# Patient Record
Sex: FEMALE | Race: WHITE | NOT HISPANIC OR LATINO | Employment: FULL TIME | URBAN - METROPOLITAN AREA
[De-identification: names, ages, dates, MRNs, and addresses within clinical notes are randomized per-mention and may not be internally consistent; named-entity substitution may affect disease eponyms.]

---

## 2022-06-24 ENCOUNTER — OFFICE VISIT (OUTPATIENT)
Dept: URGENT CARE | Facility: CLINIC | Age: 22
End: 2022-06-24
Payer: MEDICAID

## 2022-06-24 VITALS
DIASTOLIC BLOOD PRESSURE: 77 MMHG | HEART RATE: 74 BPM | SYSTOLIC BLOOD PRESSURE: 114 MMHG | OXYGEN SATURATION: 100 % | HEIGHT: 67 IN | RESPIRATION RATE: 19 BRPM | TEMPERATURE: 99 F | BODY MASS INDEX: 35.91 KG/M2 | WEIGHT: 228.81 LBS

## 2022-06-24 DIAGNOSIS — Z20.2 EXPOSURE TO SYPHILIS: Primary | ICD-10-CM

## 2022-06-24 DIAGNOSIS — Z11.3 SCREEN FOR STD (SEXUALLY TRANSMITTED DISEASE): ICD-10-CM

## 2022-06-24 LAB
C TRACH DNA SPEC QL NAA+PROBE: NOT DETECTED
HAV IGM SERPL QL IA: NONREACTIVE
HBV CORE IGM SERPL QL IA: NONREACTIVE
HBV SURFACE AG SERPL QL IA: NONREACTIVE
HCV AB SERPL QL IA: NONREACTIVE
HIV 1+2 AB+HIV1 P24 AG SERPL QL IA: NONREACTIVE
N GONORRHOEA DNA SPEC QL NAA+PROBE: NOT DETECTED
T PALLIDUM AB SER QL: REACTIVE

## 2022-06-24 PROCEDURE — 99213 PR OFFICE/OUTPT VISIT, EST, LEVL III, 20-29 MIN: ICD-10-PCS | Mod: S$PBB,,, | Performed by: NURSE PRACTITIONER

## 2022-06-24 PROCEDURE — 99203 OFFICE O/P NEW LOW 30 MIN: CPT | Mod: PBBFAC | Performed by: NURSE PRACTITIONER

## 2022-06-24 PROCEDURE — 86592 SYPHILIS TEST NON-TREP QUAL: CPT | Performed by: NURSE PRACTITIONER

## 2022-06-24 PROCEDURE — 99213 OFFICE O/P EST LOW 20 MIN: CPT | Mod: S$PBB,,, | Performed by: NURSE PRACTITIONER

## 2022-06-24 PROCEDURE — 87591 N.GONORRHOEAE DNA AMP PROB: CPT | Performed by: NURSE PRACTITIONER

## 2022-06-24 PROCEDURE — 80074 ACUTE HEPATITIS PANEL: CPT | Performed by: NURSE PRACTITIONER

## 2022-06-24 PROCEDURE — 36415 COLL VENOUS BLD VENIPUNCTURE: CPT | Performed by: NURSE PRACTITIONER

## 2022-06-24 PROCEDURE — 87491 CHLMYD TRACH DNA AMP PROBE: CPT | Performed by: NURSE PRACTITIONER

## 2022-06-24 PROCEDURE — 87389 HIV-1 AG W/HIV-1&-2 AB AG IA: CPT | Performed by: NURSE PRACTITIONER

## 2022-06-24 PROCEDURE — 86780 TREPONEMA PALLIDUM: CPT | Performed by: NURSE PRACTITIONER

## 2022-06-24 RX ORDER — DOXYCYCLINE 100 MG/1
100 CAPSULE ORAL 2 TIMES DAILY
Qty: 28 CAPSULE | Refills: 0 | Status: SHIPPED | OUTPATIENT
Start: 2022-06-24 | End: 2022-07-08

## 2022-06-24 NOTE — PROGRESS NOTES
"Subjective:       Patient ID: April Luis is a 22 y.o. female.    Vitals:  height is 5' 7" (1.702 m) and weight is 103.8 kg (228 lb 12.8 oz). Her oral temperature is 98.6 °F (37 °C). Her blood pressure is 114/77 and her pulse is 74. Her respiration is 19 and oxygen saturation is 100%.     Chief Complaint: Exposure to STD (syphilis)    Patient is a 22-year-old female, here today for exposure to syphilis.  Patient states her partner recently tested positive for syphilis.  Denies any symptoms today.  States she did have STD screening in December or January, states blood work was not done, states that she has never had blood work for STDs done.      Constitution: Negative.   HENT: Negative.    Neck: neck negative.   Cardiovascular: Negative.    Respiratory: Negative.    Skin: Negative.    Neurological: Negative.        Objective:      Physical Exam   Constitutional: She is oriented to person, place, and time. She appears well-developed.   HENT:   Head: Normocephalic.   Eyes: Conjunctivae and EOM are normal. Pupils are equal, round, and reactive to light.   Neck: Neck supple.   Cardiovascular: Normal rate, regular rhythm and normal heart sounds.   Pulmonary/Chest: Effort normal and breath sounds normal.   Musculoskeletal: Normal range of motion.         General: Normal range of motion.   Neurological: She is alert and oriented to person, place, and time.   Skin: Skin is warm and dry.   Psychiatric: Her behavior is normal.   Vitals reviewed.        Assessment:       1. Exposure to syphilis    2. Screen for STD (sexually transmitted disease)            No visits with results within 1 Day(s) from this visit.   Latest known visit with results is:   No results found for any previous visit.        No results found.   Plan:         Allergy noted to penicillin, will treat with alternative at this time.  In file sexual contacts for previous 90 days in the may also be treated.  Abstain from intercourse during treatment and for 1 " week after.  May follow-up with PCP in 6 months for f/u testing.      STD panel done and sent to lab, will notify of abnormal results.  Educated patient on the patient portal.  Practice safe sex and follow-up with PCP.    Exposure to syphilis  -     SYPHILIS ANTIBODY (WITH REFLEX RPR)  -     doxycycline (VIBRAMYCIN) 100 MG Cap; Take 1 capsule (100 mg total) by mouth 2 (two) times daily. for 14 days  Dispense: 28 capsule; Refill: 0    Screen for STD (sexually transmitted disease)  -     Chlamydia/GC, PCR  -     SYPHILIS ANTIBODY (WITH REFLEX RPR)  -     HIV 1/2 Ag/Ab (4th Gen)  -     Hepatitis Panel, Acute

## 2022-06-27 DIAGNOSIS — A53.9 SYPHILIS: Primary | ICD-10-CM

## 2022-06-27 LAB
RPR SER QL: ABNORMAL
RPR SER QL: REACTIVE
RPR SER-TITR: ABNORMAL {TITER}

## 2022-06-27 NOTE — PROGRESS NOTES
Please inform this patient that she needs to see the Infectious Disease Clinic at [facility].  A 6 month follow up with her PCP is not adequate.     I have placed a referral for ID Clinic and they will call with appointment. Please tell her to answer her phone and ensure we have the right number and an alternate.

## 2022-06-29 ENCOUNTER — TELEPHONE (OUTPATIENT)
Dept: URGENT CARE | Facility: CLINIC | Age: 22
End: 2022-06-29
Payer: MEDICAID

## 2022-06-29 NOTE — TELEPHONE ENCOUNTER
----- Message from BRITT Anne sent at 6/27/2022 10:41 AM CDT -----  Please inform this patient that she needs to see the Infectious Disease Clinic at [facility].  A 6 month follow up with her PCP is not adequate.     I have placed a referral for ID Clinic and they will call with appointment. Please tell her to answer her phone and ensure we have the right number and an alternate.

## 2022-06-29 NOTE — TELEPHONE ENCOUNTER
----- Message from BRITT Hamilton sent at 6/25/2022 11:46 AM CDT -----  Please notify that patient is positive for syphilis, she is penicillin allergic so I sent an alternate medication into the pharmacy.  She is to complete this medication as ordered.  She may follow-up with her primary doctor in 6 months for follow-up.

## 2022-07-11 ENCOUNTER — OFFICE VISIT (OUTPATIENT)
Dept: FAMILY MEDICINE | Facility: CLINIC | Age: 22
End: 2022-07-11
Payer: MEDICAID

## 2022-07-11 VITALS
DIASTOLIC BLOOD PRESSURE: 82 MMHG | HEIGHT: 67 IN | SYSTOLIC BLOOD PRESSURE: 119 MMHG | BODY MASS INDEX: 36.13 KG/M2 | TEMPERATURE: 98 F | HEART RATE: 70 BPM | WEIGHT: 230.19 LBS | OXYGEN SATURATION: 99 % | RESPIRATION RATE: 20 BRPM

## 2022-07-11 DIAGNOSIS — T78.40XA ALLERGY, INITIAL ENCOUNTER: ICD-10-CM

## 2022-07-11 DIAGNOSIS — Z00.00 ENCOUNTER FOR WELLNESS EXAMINATION: Primary | ICD-10-CM

## 2022-07-11 PROBLEM — J45.909 ASTHMA DUE TO ENVIRONMENTAL ALLERGIES: Status: ACTIVE | Noted: 2022-07-11

## 2022-07-11 LAB
ALBUMIN SERPL-MCNC: 4.5 GM/DL (ref 3.5–5)
ALBUMIN/GLOB SERPL: 1.3 RATIO (ref 1.1–2)
ALP SERPL-CCNC: 40 UNIT/L (ref 40–150)
ALT SERPL-CCNC: 44 UNIT/L (ref 0–55)
APPEARANCE UR: CLEAR
AST SERPL-CCNC: 37 UNIT/L (ref 5–34)
BACTERIA #/AREA URNS AUTO: ABNORMAL /HPF
BASOPHILS # BLD AUTO: 0.02 X10(3)/MCL (ref 0–0.2)
BASOPHILS NFR BLD AUTO: 0.4 %
BILIRUB UR QL STRIP.AUTO: NEGATIVE MG/DL
BILIRUBIN DIRECT+TOT PNL SERPL-MCNC: 2.3 MG/DL
BUN SERPL-MCNC: 10.2 MG/DL (ref 7–18.7)
CALCIUM SERPL-MCNC: 9.5 MG/DL (ref 8.4–10.2)
CHLORIDE SERPL-SCNC: 102 MMOL/L (ref 98–107)
CHOLEST SERPL-MCNC: 130 MG/DL
CHOLEST/HDLC SERPL: 4 {RATIO} (ref 0–5)
CO2 SERPL-SCNC: 26 MMOL/L (ref 22–29)
COLOR UR AUTO: ABNORMAL
CREAT SERPL-MCNC: 0.62 MG/DL (ref 0.55–1.02)
EOSINOPHIL # BLD AUTO: 0.11 X10(3)/MCL (ref 0–0.9)
EOSINOPHIL NFR BLD AUTO: 1.9 %
ERYTHROCYTE [DISTWIDTH] IN BLOOD BY AUTOMATED COUNT: 12.6 % (ref 11.5–17)
EST. AVERAGE GLUCOSE BLD GHB EST-MCNC: 85.3 MG/DL
GLOBULIN SER-MCNC: 3.4 GM/DL (ref 2.4–3.5)
GLUCOSE SERPL-MCNC: 80 MG/DL (ref 74–100)
GLUCOSE UR QL STRIP.AUTO: NORMAL MG/DL
HBA1C MFR BLD: 4.6 %
HCT VFR BLD AUTO: 41.2 % (ref 37–47)
HDLC SERPL-MCNC: 32 MG/DL (ref 35–60)
HGB BLD-MCNC: 13.8 GM/DL (ref 12–16)
HYALINE CASTS #/AREA URNS LPF: ABNORMAL /LPF
IMM GRANULOCYTES # BLD AUTO: 0.01 X10(3)/MCL (ref 0–0.04)
IMM GRANULOCYTES NFR BLD AUTO: 0.2 %
KETONES UR QL STRIP.AUTO: NEGATIVE MG/DL
LDLC SERPL CALC-MCNC: 80 MG/DL (ref 50–140)
LEUKOCYTE ESTERASE UR QL STRIP.AUTO: NEGATIVE UNIT/L
LYMPHOCYTES # BLD AUTO: 1.96 X10(3)/MCL (ref 0.6–4.6)
LYMPHOCYTES NFR BLD AUTO: 34.4 %
MCH RBC QN AUTO: 28.2 PG (ref 27–31)
MCHC RBC AUTO-ENTMCNC: 33.5 MG/DL (ref 33–36)
MCV RBC AUTO: 84.3 FL (ref 80–94)
MONOCYTES # BLD AUTO: 0.39 X10(3)/MCL (ref 0.1–1.3)
MONOCYTES NFR BLD AUTO: 6.9 %
MUCOUS THREADS URNS QL MICRO: ABNORMAL /LPF
NEUTROPHILS # BLD AUTO: 3.2 X10(3)/MCL (ref 2.1–9.2)
NEUTROPHILS NFR BLD AUTO: 56.2 %
NITRITE UR QL STRIP.AUTO: NEGATIVE
NRBC BLD AUTO-RTO: 0 %
PH UR STRIP.AUTO: 7 [PH]
PLATELET # BLD AUTO: 233 X10(3)/MCL (ref 130–400)
PMV BLD AUTO: 10.5 FL (ref 7.4–10.4)
POTASSIUM SERPL-SCNC: 4.6 MMOL/L (ref 3.5–5.1)
PROT SERPL-MCNC: 7.9 GM/DL (ref 6.4–8.3)
PROT UR QL STRIP.AUTO: NEGATIVE MG/DL
RBC # BLD AUTO: 4.89 X10(6)/MCL (ref 4.2–5.4)
RBC #/AREA URNS AUTO: ABNORMAL /HPF
RBC UR QL AUTO: NEGATIVE UNIT/L
SODIUM SERPL-SCNC: 135 MMOL/L (ref 136–145)
SP GR UR STRIP.AUTO: 1.01
SQUAMOUS #/AREA URNS LPF: ABNORMAL /HPF
T4 FREE SERPL-MCNC: 0.82 NG/DL (ref 0.7–1.48)
TRIGL SERPL-MCNC: 92 MG/DL (ref 37–140)
TSH SERPL-ACNC: 2.05 UIU/ML (ref 0.35–4.94)
UROBILINOGEN UR STRIP-ACNC: NORMAL MG/DL
VLDLC SERPL CALC-MCNC: 18 MG/DL
WBC # SPEC AUTO: 5.7 X10(3)/MCL (ref 4.5–11.5)
WBC #/AREA URNS AUTO: ABNORMAL /HPF

## 2022-07-11 PROCEDURE — 1159F PR MEDICATION LIST DOCUMENTED IN MEDICAL RECORD: ICD-10-PCS | Mod: CPTII,,,

## 2022-07-11 PROCEDURE — 84439 ASSAY OF FREE THYROXINE: CPT

## 2022-07-11 PROCEDURE — 3079F DIAST BP 80-89 MM HG: CPT | Mod: CPTII,,,

## 2022-07-11 PROCEDURE — 85025 COMPLETE CBC W/AUTO DIFF WBC: CPT

## 2022-07-11 PROCEDURE — 3008F BODY MASS INDEX DOCD: CPT | Mod: CPTII,,,

## 2022-07-11 PROCEDURE — 83036 HEMOGLOBIN GLYCOSYLATED A1C: CPT

## 2022-07-11 PROCEDURE — 99213 OFFICE O/P EST LOW 20 MIN: CPT | Mod: PBBFAC,PN

## 2022-07-11 PROCEDURE — 3008F PR BODY MASS INDEX (BMI) DOCUMENTED: ICD-10-PCS | Mod: CPTII,,,

## 2022-07-11 PROCEDURE — 3074F SYST BP LT 130 MM HG: CPT | Mod: CPTII,,,

## 2022-07-11 PROCEDURE — 99213 OFFICE O/P EST LOW 20 MIN: CPT | Mod: S$PBB,,,

## 2022-07-11 PROCEDURE — 3079F PR MOST RECENT DIASTOLIC BLOOD PRESSURE 80-89 MM HG: ICD-10-PCS | Mod: CPTII,,,

## 2022-07-11 PROCEDURE — 80053 COMPREHEN METABOLIC PANEL: CPT

## 2022-07-11 PROCEDURE — 99213 PR OFFICE/OUTPT VISIT, EST, LEVL III, 20-29 MIN: ICD-10-PCS | Mod: S$PBB,,,

## 2022-07-11 PROCEDURE — 3074F PR MOST RECENT SYSTOLIC BLOOD PRESSURE < 130 MM HG: ICD-10-PCS | Mod: CPTII,,,

## 2022-07-11 PROCEDURE — 80061 LIPID PANEL: CPT

## 2022-07-11 PROCEDURE — 81001 URINALYSIS AUTO W/SCOPE: CPT

## 2022-07-11 PROCEDURE — 36415 COLL VENOUS BLD VENIPUNCTURE: CPT

## 2022-07-11 PROCEDURE — 1159F MED LIST DOCD IN RCRD: CPT | Mod: CPTII,,,

## 2022-07-11 PROCEDURE — 84443 ASSAY THYROID STIM HORMONE: CPT

## 2022-07-11 RX ORDER — FLUTICASONE PROPIONATE 50 MCG
1 SPRAY, SUSPENSION (ML) NASAL DAILY
Qty: 18.2 ML | Refills: 3 | Status: SHIPPED | OUTPATIENT
Start: 2022-07-11 | End: 2023-02-15 | Stop reason: CLARIF

## 2022-07-11 RX ORDER — LORATADINE 10 MG/1
10 TABLET ORAL DAILY
Qty: 30 TABLET | Refills: 3 | Status: SHIPPED | OUTPATIENT
Start: 2022-07-11 | End: 2023-08-15

## 2022-07-11 NOTE — PROGRESS NOTES
Patient Name: Aleksandra Smith   : 2000  MRN: [id number]     Subjective:   Patient ID: Aleksandra Smith is a 22 y.o. female.    Chief Complaint:   Chief Complaint   Patient presents with    Establish Care        HPI: 2022:  Recently tested positive for syphilis; treated with doxy due to pen allergy. Otherwise she has no medical concerns. She has occasional constipation for which she is easily relieved by OTC meds. Seasonal allergies, takes OTC medications with relief.       ROS:  Review of Systems   Constitutional: Negative for chills, fever and weight loss.   HENT: Negative for ear discharge, nosebleeds and tinnitus.    Eyes: Negative for blurred vision, photophobia and pain.   Respiratory: Negative for cough, shortness of breath, wheezing and stridor.    Cardiovascular: Negative for chest pain, palpitations and orthopnea.   Gastrointestinal: Negative for abdominal pain, heartburn and nausea.   Genitourinary: Negative for dysuria, frequency, hematuria and urgency.   Musculoskeletal: Negative for falls and myalgias.   Skin: Negative for itching and rash.   Neurological: Negative for dizziness, sensory change, speech change, focal weakness, seizures, weakness and headaches.   Endo/Heme/Allergies: Negative for environmental allergies. Does not bruise/bleed easily.   Psychiatric/Behavioral: Negative for hallucinations and suicidal ideas.      History:   History reviewed. No pertinent past medical history.   History reviewed. No pertinent surgical history.  Family History   Family history unknown: Yes      Social History     Tobacco Use    Smoking status: Current Every Day Smoker     Types: Vaping with nicotine    Smokeless tobacco: Current User   Substance and Sexual Activity    Alcohol use: Not Currently    Drug use: Yes     Types: Marijuana    Sexual activity: Yes     Partners: Male        Allergies:   Review of patient's allergies indicates:   Allergen Reactions    Penicillins      Unknown: Was told by  "mother that she was allergic     Objective:     Vitals:    07/11/22 1315   BP: 119/82   Pulse: 70   Resp: 20   Temp: 98.1 °F (36.7 °C)   SpO2: 99%   Weight: 104.4 kg (230 lb 2.6 oz)   Height: 5' 7" (1.702 m)     Body mass index is 36.05 kg/m².     Physical Examination:   Physical Exam  Vitals reviewed.   Constitutional:       Appearance: Normal appearance. She is normal weight.   HENT:      Head: Normocephalic.      Right Ear: Tympanic membrane, ear canal and external ear normal.      Left Ear: Tympanic membrane, ear canal and external ear normal.      Nose: Nose normal.      Mouth/Throat:      Mouth: Mucous membranes are moist.      Pharynx: Oropharynx is clear.   Eyes:      Extraocular Movements: Extraocular movements intact.      Conjunctiva/sclera: Conjunctivae normal.      Pupils: Pupils are equal, round, and reactive to light.   Cardiovascular:      Rate and Rhythm: Normal rate and regular rhythm.      Pulses: Normal pulses.      Heart sounds: Normal heart sounds.   Pulmonary:      Effort: Pulmonary effort is normal.      Breath sounds: Normal breath sounds.   Abdominal:      General: Abdomen is flat. Bowel sounds are normal.      Palpations: Abdomen is soft.   Musculoskeletal:         General: Normal range of motion.      Cervical back: Normal range of motion and neck supple.   Skin:     General: Skin is warm and dry.   Neurological:      General: No focal deficit present.      Mental Status: She is alert and oriented to person, place, and time.   Psychiatric:         Mood and Affect: Mood normal.         Behavior: Behavior normal.         Assessment:     Problem List Items Addressed This Visit        Other    Allergies (Chronic)    Overview       Avoid/limit triggers such as pollen, dust, molds and animal dander.  Avoid smoke, strong chemicals, and strong cleaning products in addition to strong perfumes/colognes.  Use rescue inhaler when needed, use nebulizer for wheezing or URI.  Report Upper Respiratory " Infection in early stages and seek treatment.               Current Assessment & Plan     Claritin and Flonase sent to pharmacy to trial           Relevant Medications    loratadine (CLARITIN) 10 mg tablet    fluticasone propionate (FLONASE) 50 mcg/actuation nasal spray      Other Visit Diagnoses     Encounter for wellness examination    -  Primary    Relevant Orders    CBC Auto Differential    Comprehensive Metabolic Panel    Lipid Panel    T4, Free    TSH    Urinalysis    Hemoglobin A1C    Ambulatory referral/consult to Gynecology          Plan:   April was seen today for establish care.    Diagnoses and all orders for this visit:    Encounter for wellness examination  -     CBC Auto Differential  -     Comprehensive Metabolic Panel  -     Lipid Panel  -     T4, Free  -     TSH  -     Urinalysis  -     Hemoglobin A1C  -     Ambulatory referral/consult to Gynecology; Future    Allergy, initial encounter  -     loratadine (CLARITIN) 10 mg tablet; Take 1 tablet (10 mg total) by mouth once daily.  -     fluticasone propionate (FLONASE) 50 mcg/actuation nasal spray; 1 spray (50 mcg total) by Each Nostril route once daily.       No follow-ups on file.       This note was created with the assistance of a voice recognition software or phone dictation. There may be transcription errors as a result of using this technology however minimal. Effort has been made to assure accuracy of transcription but any obvious errors or omissions should be clarified with the author of the document

## 2022-07-24 ENCOUNTER — OFFICE VISIT (OUTPATIENT)
Dept: URGENT CARE | Facility: CLINIC | Age: 22
End: 2022-07-24
Payer: MEDICAID

## 2022-07-24 VITALS
SYSTOLIC BLOOD PRESSURE: 124 MMHG | TEMPERATURE: 98 F | HEART RATE: 71 BPM | RESPIRATION RATE: 18 BRPM | BODY MASS INDEX: 36.26 KG/M2 | WEIGHT: 231 LBS | DIASTOLIC BLOOD PRESSURE: 81 MMHG | HEIGHT: 67 IN | OXYGEN SATURATION: 98 %

## 2022-07-24 DIAGNOSIS — J01.90 ACUTE SINUSITIS, RECURRENCE NOT SPECIFIED, UNSPECIFIED LOCATION: Primary | ICD-10-CM

## 2022-07-24 DIAGNOSIS — Z11.52 ENCOUNTER FOR SCREENING FOR COVID-19: ICD-10-CM

## 2022-07-24 DIAGNOSIS — R68.89 FLU-LIKE SYMPTOMS: ICD-10-CM

## 2022-07-24 LAB
CTP QC/QA: YES
CTP QC/QA: YES
FLUAV AG NPH QL: NEGATIVE
FLUBV AG NPH QL: NEGATIVE
SARS-COV-2 RDRP RESP QL NAA+PROBE: NEGATIVE

## 2022-07-24 PROCEDURE — 99213 OFFICE O/P EST LOW 20 MIN: CPT | Mod: PBBFAC | Performed by: NURSE PRACTITIONER

## 2022-07-24 PROCEDURE — 87804 INFLUENZA ASSAY W/OPTIC: CPT | Mod: PBBFAC | Performed by: NURSE PRACTITIONER

## 2022-07-24 PROCEDURE — 99213 PR OFFICE/OUTPT VISIT, EST, LEVL III, 20-29 MIN: ICD-10-PCS | Mod: S$PBB,,, | Performed by: NURSE PRACTITIONER

## 2022-07-24 PROCEDURE — 99213 OFFICE O/P EST LOW 20 MIN: CPT | Mod: S$PBB,,, | Performed by: NURSE PRACTITIONER

## 2022-07-24 PROCEDURE — U0002 COVID-19 LAB TEST NON-CDC: HCPCS | Mod: PBBFAC | Performed by: NURSE PRACTITIONER

## 2022-07-24 RX ORDER — AZITHROMYCIN 250 MG/1
TABLET, FILM COATED ORAL
Qty: 6 TABLET | Refills: 0 | Status: SHIPPED | OUTPATIENT
Start: 2022-07-24 | End: 2023-02-15 | Stop reason: CLARIF

## 2022-07-24 RX ORDER — DEXAMETHASONE SODIUM PHOSPHATE 100 MG/10ML
8 INJECTION INTRAMUSCULAR; INTRAVENOUS
Status: COMPLETED | OUTPATIENT
Start: 2022-07-24 | End: 2022-07-24

## 2022-07-24 RX ADMIN — DEXAMETHASONE SODIUM PHOSPHATE 8 MG: 100 INJECTION INTRAMUSCULAR; INTRAVENOUS at 12:07

## 2022-07-24 NOTE — PROGRESS NOTES
"Subjective:       Patient ID: April Luis is a 22 y.o. female.    Vitals:  height is 5' 7.01" (1.702 m) and weight is 104.8 kg (231 lb). Her oral temperature is 97.9 °F (36.6 °C). Her blood pressure is 124/81 and her pulse is 71. Her respiration is 18 and oxygen saturation is 98%.     Chief Complaint: Nasal Congestion, Sinus Problem, Headache, no smell, no taste, and Generalized Body Aches    Patient is a 22-year-old female, here today for nasal congestion, headache pressure, no taste or smell, body aches x 1.5 weeks. Taking otc claritin.       Constitution: Negative.   HENT: Positive for congestion and sinus pressure.    Neck: neck negative.   Cardiovascular: Negative.    Respiratory: Negative.        Objective:      Physical Exam   Constitutional: She is oriented to person, place, and time. She appears well-developed.   HENT:   Head: Normocephalic.   Ears:   Right Ear: Tympanic membrane normal.   Left Ear: Tympanic membrane normal.   Nose: Right sinus exhibits frontal sinus tenderness. Left sinus exhibits frontal sinus tenderness.   Eyes: Conjunctivae and EOM are normal. Pupils are equal, round, and reactive to light.   Neck: Neck supple.   Cardiovascular: Normal rate, regular rhythm and normal heart sounds.   Pulmonary/Chest: Effort normal and breath sounds normal.   Abdominal: Bowel sounds are normal. Soft.   Musculoskeletal: Normal range of motion.         General: Normal range of motion.   Neurological: She is alert and oriented to person, place, and time.   Skin: Skin is warm and dry. Capillary refill takes less than 2 seconds.   Psychiatric: Her behavior is normal.   Vitals reviewed.        Assessment:       1. Acute sinusitis, recurrence not specified, unspecified location    2. Encounter for screening for COVID-19    3. Flu-like symptoms            Office Visit on 07/24/2022   Component Date Value Ref Range Status    POC Rapid COVID 07/24/2022 Negative  Negative Final     Acceptable " 07/24/2022 Yes   Final    Rapid Influenza A Ag 07/24/2022 Negative  Negative Final    Rapid Influenza B Ag 07/24/2022 Negative  Negative Final     Acceptable 07/24/2022 Yes   Final        No results found.   Plan:         Decadron 8mg IM in office.   Medication as ordered. May use humidifier.  If any shortness of breath, wheezing, continued fevers or any new symptoms then immediately go to ER.    Acute sinusitis, recurrence not specified, unspecified location  -     dexamethasone injection 8 mg  -     azithromycin (ZITHROMAX Z-CARMELITA) 250 MG tablet; Take 2 tablets (500 mg) on  Day 1,  followed by 1 tablet (250 mg) once daily on Days 2 through 5.  Dispense: 6 tablet; Refill: 0    Encounter for screening for COVID-19  -     POCT COVID-19 Rapid Screening    Flu-like symptoms  -     POCT Influenza A/B

## 2022-07-24 NOTE — LETTER
July 24, 2022      [facility]  [address] [facility] [address]  Phone: [phone]       Patient: Aleksandra Smith   YOB: 2000  Date of Visit: 07/24/2022    To Whom It May Concern:    Amadou Smith  was at [facility] on 07/24/2022. The patient may return to work/school on 07/25/2022 with no restrictions. If you have any questions or concerns, or if I can be of further assistance, please do not hesitate to contact me.    Sincerely,    Elvia Campo LPN

## 2022-10-26 ENCOUNTER — OFFICE VISIT (OUTPATIENT)
Dept: FAMILY MEDICINE | Facility: CLINIC | Age: 22
End: 2022-10-26
Payer: MEDICAID

## 2022-10-26 VITALS
BODY MASS INDEX: 37.65 KG/M2 | OXYGEN SATURATION: 96 % | WEIGHT: 239.88 LBS | RESPIRATION RATE: 18 BRPM | HEIGHT: 67 IN | SYSTOLIC BLOOD PRESSURE: 116 MMHG | HEART RATE: 89 BPM | DIASTOLIC BLOOD PRESSURE: 81 MMHG | TEMPERATURE: 98 F

## 2022-10-26 DIAGNOSIS — N89.8 VAGINA ITCHING: Primary | ICD-10-CM

## 2022-10-26 DIAGNOSIS — Z86.19 HISTORY OF SYPHILIS: ICD-10-CM

## 2022-10-26 LAB — T PALLIDUM AB SER QL: REACTIVE

## 2022-10-26 PROCEDURE — 3074F SYST BP LT 130 MM HG: CPT | Mod: CPTII,,,

## 2022-10-26 PROCEDURE — 1159F MED LIST DOCD IN RCRD: CPT | Mod: CPTII,,,

## 2022-10-26 PROCEDURE — 99213 OFFICE O/P EST LOW 20 MIN: CPT | Mod: PBBFAC,PN

## 2022-10-26 PROCEDURE — 3074F PR MOST RECENT SYSTOLIC BLOOD PRESSURE < 130 MM HG: ICD-10-PCS | Mod: CPTII,,,

## 2022-10-26 PROCEDURE — 99213 OFFICE O/P EST LOW 20 MIN: CPT | Mod: S$PBB,,,

## 2022-10-26 PROCEDURE — 3079F PR MOST RECENT DIASTOLIC BLOOD PRESSURE 80-89 MM HG: ICD-10-PCS | Mod: CPTII,,,

## 2022-10-26 PROCEDURE — 3079F DIAST BP 80-89 MM HG: CPT | Mod: CPTII,,,

## 2022-10-26 PROCEDURE — 1159F PR MEDICATION LIST DOCUMENTED IN MEDICAL RECORD: ICD-10-PCS | Mod: CPTII,,,

## 2022-10-26 PROCEDURE — 86592 SYPHILIS TEST NON-TREP QUAL: CPT

## 2022-10-26 PROCEDURE — 86780 TREPONEMA PALLIDUM: CPT

## 2022-10-26 PROCEDURE — 99213 PR OFFICE/OUTPT VISIT, EST, LEVL III, 20-29 MIN: ICD-10-PCS | Mod: S$PBB,,,

## 2022-10-26 PROCEDURE — 36415 COLL VENOUS BLD VENIPUNCTURE: CPT

## 2022-10-26 RX ORDER — METRONIDAZOLE 7.5 MG/G
1 GEL VAGINAL 2 TIMES DAILY
Qty: 70 G | Refills: 0 | Status: SHIPPED | OUTPATIENT
Start: 2022-10-26 | End: 2022-11-02

## 2022-10-26 NOTE — PROGRESS NOTES
"Patient Name: Aleksandra Smith   : 2000  MRN: [id number]     Subjective:   Patient ID: Aleksandra Smith is a 22 y.o. female.    Chief Complaint:   Chief Complaint   Patient presents with    Follow-up     C/O vaginal itching         HPI: 10/26/2022:  Rechecking she titers today she had completed all of her medications without any issues.  Routine lab work reviewed with patient with no questions or concerns.  Patient is additionally complaining of vaginal irritation she states that she knows is that to alleges that she is often requesting the cream.  Denies odor, discharge changes in cycles are exposure to STDs.  Patient also states that he was in a only occurs occasionally and is very tolerable.  2022:  Recently tested positive for syphilis; treated with doxy due to pen allergy. Otherwise she has no medical concerns. She has occasional constipation for which she is easily relieved by OTC meds. Seasonal allergies, takes OTC medications with relief.          ROS:  Review of Systems   Genitourinary:         Vaginal itching   All other systems reviewed and are negative.   History:   History reviewed. No pertinent past medical history.   History reviewed. No pertinent surgical history.  Family History   Family history unknown: Yes      Social History     Tobacco Use    Smoking status: Every Day     Types: Vaping with nicotine    Smokeless tobacco: Current   Substance and Sexual Activity    Alcohol use: Not Currently    Drug use: Yes     Types: Marijuana    Sexual activity: Yes     Partners: Male        Allergies:   Review of patient's allergies indicates:   Allergen Reactions    Penicillins      Unknown: Was told by mother that she was allergic     Objective:     Vitals:    10/26/22 0730   BP: 116/81   Pulse: 89   Resp: 18   Temp: 98.2 °F (36.8 °C)   SpO2: 96%   Weight: 108.8 kg (239 lb 14.4 oz)   Height: 5' 7" (1.702 m)     Body mass index is 37.57 kg/m².     Physical Examination:   Physical Exam  Constitutional:       " General: She is not in acute distress.     Appearance: Normal appearance. She is not ill-appearing.   Cardiovascular:      Rate and Rhythm: Normal rate and regular rhythm.      Heart sounds: Normal heart sounds.   Pulmonary:      Effort: Pulmonary effort is normal. No respiratory distress.      Breath sounds: Normal breath sounds.   Musculoskeletal:      Cervical back: Normal range of motion.   Skin:     General: Skin is warm and dry.   Neurological:      Mental Status: She is alert and oriented to person, place, and time.   Psychiatric:         Mood and Affect: Mood normal.         Behavior: Behavior normal.       Assessment:     Problem List Items Addressed This Visit          ID    History of syphilis    Relevant Orders    SYPHILIS ANTIBODY (WITH REFLEX RPR)     Other Visit Diagnoses       Vagina itching    -  Primary    Relevant Medications    metroNIDAZOLE (METROGEL) 0.75 % (37.5mg/5 gram) vaginal gel            Plan:   April was seen today for follow-up.    Diagnoses and all orders for this visit:    Vagina itching  -     metroNIDAZOLE (METROGEL) 0.75 % (37.5mg/5 gram) vaginal gel; Place 1 applicator vaginally 2 (two) times daily. for 7 days    History of syphilis  -     SYPHILIS ANTIBODY (WITH REFLEX RPR)     Follow up in about 6 months (around 4/26/2023) for routine labs recheck.     This note was created with the assistance of Dragon voice recognition software or phone dictation. There may be transcription errors as a result of using this technology however minimal. Effort has been made to assure accuracy of transcription but any obvious errors or omissions should be clarified with the author of the document

## 2022-10-27 LAB
RPR SER QL: ABNORMAL
RPR SER QL: REACTIVE
RPR SER-TITR: ABNORMAL {TITER}

## 2022-12-04 ENCOUNTER — HOSPITAL ENCOUNTER (EMERGENCY)
Facility: HOSPITAL | Age: 22
Discharge: HOME | End: 2022-12-04
Attending: EMERGENCY MEDICINE
Payer: MEDICAID

## 2022-12-04 ENCOUNTER — OFFICE VISIT (OUTPATIENT)
Dept: URGENT CARE | Facility: CLINIC | Age: 22
End: 2022-12-04
Payer: MEDICAID

## 2022-12-04 VITALS
HEIGHT: 67 IN | WEIGHT: 242.5 LBS | BODY MASS INDEX: 38.06 KG/M2 | OXYGEN SATURATION: 98 % | SYSTOLIC BLOOD PRESSURE: 114 MMHG | RESPIRATION RATE: 20 BRPM | DIASTOLIC BLOOD PRESSURE: 77 MMHG | HEART RATE: 78 BPM | TEMPERATURE: 97 F

## 2022-12-04 VITALS
WEIGHT: 242.81 LBS | OXYGEN SATURATION: 98 % | SYSTOLIC BLOOD PRESSURE: 118 MMHG | DIASTOLIC BLOOD PRESSURE: 82 MMHG | RESPIRATION RATE: 18 BRPM | HEART RATE: 83 BPM | BODY MASS INDEX: 38.11 KG/M2 | HEIGHT: 67 IN | TEMPERATURE: 98 F

## 2022-12-04 DIAGNOSIS — Z11.52 ENCOUNTER FOR SCREENING FOR COVID-19: ICD-10-CM

## 2022-12-04 DIAGNOSIS — Z32.00 ENCOUNTER FOR PREGNANCY TEST, RESULT UNKNOWN: ICD-10-CM

## 2022-12-04 DIAGNOSIS — Z13.9 ENCOUNTER FOR MEDICAL SCREENING EXAMINATION: Primary | ICD-10-CM

## 2022-12-04 DIAGNOSIS — R68.89 FLU-LIKE SYMPTOMS: Primary | ICD-10-CM

## 2022-12-04 DIAGNOSIS — J02.9 SORE THROAT: ICD-10-CM

## 2022-12-04 LAB
B-HCG UR QL: NEGATIVE
CTP QC/QA: YES
CTP QC/QA: YES
FLUAV AG UPPER RESP QL IA.RAPID: NOT DETECTED
FLUBV AG UPPER RESP QL IA.RAPID: NOT DETECTED
RSV A 5' UTR RNA NPH QL NAA+PROBE: NOT DETECTED
S PYO RRNA THROAT QL PROBE: NEGATIVE
SARS-COV-2 RNA RESP QL NAA+PROBE: NOT DETECTED

## 2022-12-04 PROCEDURE — 99213 OFFICE O/P EST LOW 20 MIN: CPT | Mod: S$PBB,,,

## 2022-12-04 PROCEDURE — 0241U COVID/RSV/FLU A&B PCR: CPT

## 2022-12-04 PROCEDURE — 99281 EMR DPT VST MAYX REQ PHY/QHP: CPT | Mod: 25,27

## 2022-12-04 PROCEDURE — 81025 URINE PREGNANCY TEST: CPT | Mod: PBBFAC

## 2022-12-04 PROCEDURE — 87081 CULTURE SCREEN ONLY: CPT

## 2022-12-04 PROCEDURE — 87880 STREP A ASSAY W/OPTIC: CPT | Mod: PBBFAC

## 2022-12-04 PROCEDURE — 99213 PR OFFICE/OUTPT VISIT, EST, LEVL III, 20-29 MIN: ICD-10-PCS | Mod: S$PBB,,,

## 2022-12-04 PROCEDURE — 99214 OFFICE O/P EST MOD 30 MIN: CPT | Mod: PBBFAC

## 2022-12-04 NOTE — ED PROVIDER NOTES
Encounter Date: 12/4/2022       History     Chief Complaint   Patient presents with    Headache    Sinusitis    Sore Throat     C/o above symptoms x 1 week.      Patient present with uri symptoms for 1 week: sore throat, sinus congestion, mild headache since she took tylenol earlier. She is afebrile. She denies fever, shortness of breath, chest pain.    Review of patient's allergies indicates:   Allergen Reactions    Penicillins      Unknown: Was told by mother that she was allergic     History reviewed. No pertinent past medical history.  History reviewed. No pertinent surgical history.  Family History   Family history unknown: Yes     Social History     Tobacco Use    Smoking status: Every Day     Types: Vaping with nicotine    Smokeless tobacco: Current   Substance Use Topics    Alcohol use: Not Currently    Drug use: Yes     Types: Marijuana     Review of Systems   Constitutional:  Negative for fever.   HENT:  Positive for congestion and sore throat.    Respiratory:  Negative for shortness of breath.    Cardiovascular:  Negative for chest pain.   Gastrointestinal:  Negative for nausea.   Genitourinary:  Negative for dysuria.   Musculoskeletal:  Negative for back pain.   Skin:  Negative for rash.   Neurological:  Positive for headaches. Negative for weakness.   Hematological:  Does not bruise/bleed easily.   All other systems reviewed and are negative.    Physical Exam     Initial Vitals [12/04/22 1622]   BP Pulse Resp Temp SpO2   114/77 78 20 97.2 °F (36.2 °C) 98 %      MAP       --         Physical Exam    Nursing note and vitals reviewed.  Constitutional: She appears well-developed and well-nourished.   HENT:   Head: Normocephalic and atraumatic.   Neck: Neck supple.   Normal range of motion.  Cardiovascular:  Normal rate, regular rhythm, normal heart sounds and intact distal pulses.           Pulmonary/Chest: Breath sounds normal.   Musculoskeletal:         General: Normal range of motion.      Cervical back:  Normal range of motion and neck supple.     Neurological: She is alert. She has normal strength.   Skin: Skin is warm and dry.   Psychiatric: She has a normal mood and affect.       ED Course   Procedures  Labs Reviewed - No data to display       Imaging Results    None          Medications - No data to display  Medical Decision Making:   History:   Old Records Summarized: records from clinic visits and records from previous admission(s).  MEDICAL SCREENING EXAM      An emergency medical screening examination have been completed for this patient.  After completed vital signs and initial emergency assessment, no acute, unstable, medical emergency condition have been identified.  Patient has been informed about alternatives options for care including urgent care facilities, primary care provider office and virtual urgent care services (Telemedicine).  Pt was also provided with the option to wait for non-emergency care in the Emergency Department. Pt understood that no acute emergency is identified and understood the signs and symptoms that could require return to the emergency department for new evaluation.  Pt is discharged from the emergency department in stable condition.                        Clinical Impression:   Final diagnoses:  [Z13.9] Encounter for medical screening examination (Primary)      ED Disposition Condition    Discharge Stable          ED Prescriptions    None       Follow-up Information       Follow up With Specialties Details Why Contact Info    follow up in an urgent care or your pcp as needed        [facility] Emergency Medicine  If symptoms worsen [address] [address] [phone]             RHYS Tarango  12/04/22 1929

## 2022-12-04 NOTE — PROGRESS NOTES
Subjective:       Patient ID: April Luis is a 22 y.o. female.    Vitals:  vitals were not taken for this visit.     Chief Complaint: Cough (HA, cough, congestion, sore throat, body aches ~ 1 week.) and Encounter for pregnancy test (LMP 9/4/22)    Pt states headache, cough, congestion, body aches and headache for a week. Denies sick contacts. Pt would also like a pregnancy test, LMP 9/4.     Cough  Associated symptoms include headaches and a sore throat.     Constitution: Negative.   HENT:  Positive for congestion and sore throat.    Neck: neck negative.   Cardiovascular: Negative.    Eyes: Negative.    Respiratory:  Positive for cough.    Gastrointestinal: Negative.    Genitourinary:  Positive for missed menses.   Musculoskeletal: Negative.    Skin: Negative.    Neurological:  Positive for headaches.     Objective:      Physical Exam   Constitutional: She is oriented to person, place, and time.   HENT:   Head: Normocephalic.   Ears:   Right Ear: Tympanic membrane, external ear and ear canal normal.   Left Ear: Tympanic membrane, external ear and ear canal normal.   Nose: Congestion present.   Mouth/Throat: Uvula is midline, oropharynx is clear and moist and mucous membranes are normal. Mucous membranes are moist. Oropharynx is clear.   Eyes: Pupils are equal, round, and reactive to light.   Neck: Neck supple.   Cardiovascular: Normal rate, regular rhythm, normal heart sounds and normal pulses.   Pulmonary/Chest: Effort normal and breath sounds normal.   Abdominal: Normal appearance. Soft.   Musculoskeletal: Normal range of motion.         General: Normal range of motion.   Neurological: She is alert and oriented to person, place, and time.   Skin: Skin is warm and dry.   Vitals reviewed.      Assessment:       1. Flu-like symptoms    2. Sore throat    3. Encounter for screening for COVID-19    4. Encounter for pregnancy test, result unknown            Plan:         Flu-like symptoms  -     POCT rapid strep A  -      COVID/RSV/FLU A&B PCR; Future; Expected date: 12/04/2022    Sore throat  -     POCT rapid strep A  -     COVID/RSV/FLU A&B PCR; Future; Expected date: 12/04/2022    Encounter for screening for COVID-19  -     COVID/RSV/FLU A&B PCR; Future; Expected date: 12/04/2022    Encounter for pregnancy test, result unknown  -     POCT urine pregnancy    Please drink plenty of fluids.  Please get plenty of rest.  Please return here or go to the Emergency Department for any concerns or worsening of condition.      Take over the counter Tylenol (Acetaminophen) and/or Motrin (Ibuprofen) as directed for control of pain and/or fever.  Please follow up with your primary care doctor.     ER precautions given, pt verbalized understanding.     Please see provided patient education for guidance.

## 2022-12-07 LAB — BACTERIA THROAT CULT: NORMAL

## 2022-12-08 ENCOUNTER — TELEPHONE (OUTPATIENT)
Dept: URGENT CARE | Facility: CLINIC | Age: 22
End: 2022-12-08
Payer: MEDICAID

## 2023-01-16 ENCOUNTER — HOSPITAL ENCOUNTER (EMERGENCY)
Facility: HOSPITAL | Age: 23
Discharge: HOME | End: 2023-01-17
Attending: FAMILY MEDICINE
Payer: MEDICAID

## 2023-01-16 DIAGNOSIS — N94.9 ADNEXAL CYST: Primary | ICD-10-CM

## 2023-01-16 LAB
ALBUMIN SERPL-MCNC: 4.1 G/DL (ref 3.5–5)
ALBUMIN/GLOB SERPL: 1.3 RATIO (ref 1.1–2)
ALP SERPL-CCNC: 36 UNIT/L (ref 40–150)
ALT SERPL-CCNC: 32 UNIT/L (ref 0–55)
APPEARANCE UR: CLEAR
AST SERPL-CCNC: 24 UNIT/L (ref 5–34)
B-HCG UR QL: NEGATIVE
BACTERIA #/AREA URNS AUTO: ABNORMAL /HPF
BASOPHILS # BLD AUTO: 0.04 X10(3)/MCL (ref 0–0.2)
BASOPHILS NFR BLD AUTO: 0.4 %
BILIRUB UR QL STRIP.AUTO: NEGATIVE MG/DL
BILIRUBIN DIRECT+TOT PNL SERPL-MCNC: 1.5 MG/DL
BUN SERPL-MCNC: 8.2 MG/DL (ref 7–18.7)
C TRACH DNA SPEC QL NAA+PROBE: NOT DETECTED
CALCIUM SERPL-MCNC: 9.1 MG/DL (ref 8.4–10.2)
CHLORIDE SERPL-SCNC: 107 MMOL/L (ref 98–107)
CLUE CELLS VAG QL WET PREP: ABNORMAL
CO2 SERPL-SCNC: 24 MMOL/L (ref 22–29)
COLOR UR AUTO: COLORLESS
CREAT SERPL-MCNC: 0.74 MG/DL (ref 0.55–1.02)
CTP QC/QA: YES
EOSINOPHIL # BLD AUTO: 0.14 X10(3)/MCL (ref 0–0.9)
EOSINOPHIL NFR BLD AUTO: 1.5 %
ERYTHROCYTE [DISTWIDTH] IN BLOOD BY AUTOMATED COUNT: 12.9 % (ref 11.5–17)
GFR SERPLBLD CREATININE-BSD FMLA CKD-EPI: >60 MLS/MIN/1.73/M2
GLOBULIN SER-MCNC: 3.2 GM/DL (ref 2.4–3.5)
GLUCOSE SERPL-MCNC: 83 MG/DL (ref 74–100)
GLUCOSE UR QL STRIP.AUTO: NORMAL MG/DL
HCT VFR BLD AUTO: 36.9 % (ref 37–47)
HGB BLD-MCNC: 12.9 GM/DL (ref 12–16)
HYALINE CASTS #/AREA URNS LPF: ABNORMAL /LPF
IMM GRANULOCYTES # BLD AUTO: 0.03 X10(3)/MCL (ref 0–0.04)
IMM GRANULOCYTES NFR BLD AUTO: 0.3 %
KETONES UR QL STRIP.AUTO: NEGATIVE MG/DL
LEUKOCYTE ESTERASE UR QL STRIP.AUTO: NEGATIVE UNIT/L
LYMPHOCYTES # BLD AUTO: 2.16 X10(3)/MCL (ref 0.6–4.6)
LYMPHOCYTES NFR BLD AUTO: 23.3 %
MCH RBC QN AUTO: 29.9 PG
MCHC RBC AUTO-ENTMCNC: 35 MG/DL (ref 33–36)
MCV RBC AUTO: 85.6 FL (ref 80–94)
MONOCYTES # BLD AUTO: 0.54 X10(3)/MCL (ref 0.1–1.3)
MONOCYTES NFR BLD AUTO: 5.8 %
N GONORRHOEA DNA SPEC QL NAA+PROBE: NOT DETECTED
NEUTROPHILS # BLD AUTO: 6.35 X10(3)/MCL (ref 2.1–9.2)
NEUTROPHILS NFR BLD AUTO: 68.7 %
NITRITE UR QL STRIP.AUTO: NEGATIVE
NRBC BLD AUTO-RTO: 0 %
PH UR STRIP.AUTO: 6 [PH]
PLATELET # BLD AUTO: 252 X10(3)/MCL (ref 130–400)
PMV BLD AUTO: 10.5 FL (ref 7.4–10.4)
POTASSIUM SERPL-SCNC: 3.5 MMOL/L (ref 3.5–5.1)
PROT SERPL-MCNC: 7.3 GM/DL (ref 6.4–8.3)
PROT UR QL STRIP.AUTO: NEGATIVE MG/DL
RBC # BLD AUTO: 4.31 X10(6)/MCL (ref 4.2–5.4)
RBC #/AREA URNS AUTO: ABNORMAL /HPF
RBC UR QL AUTO: ABNORMAL UNIT/L
SODIUM SERPL-SCNC: 139 MMOL/L (ref 136–145)
SP GR UR STRIP.AUTO: 1.01
SQUAMOUS #/AREA URNS LPF: ABNORMAL /HPF
T VAGINALIS VAG QL WET PREP: ABNORMAL
UROBILINOGEN UR STRIP-ACNC: NORMAL MG/DL
WBC # SPEC AUTO: 9.3 X10(3)/MCL (ref 4.5–11.5)
WBC #/AREA URNS AUTO: ABNORMAL /HPF
WBC #/AREA VAG WET PREP: ABNORMAL
YEAST SPEC QL WET PREP: ABNORMAL

## 2023-01-16 PROCEDURE — 63600175 PHARM REV CODE 636 W HCPCS: Performed by: PHYSICIAN ASSISTANT

## 2023-01-16 PROCEDURE — 81025 URINE PREGNANCY TEST: CPT | Performed by: PHYSICIAN ASSISTANT

## 2023-01-16 PROCEDURE — 99285 EMERGENCY DEPT VISIT HI MDM: CPT | Mod: 25

## 2023-01-16 PROCEDURE — 87591 N.GONORRHOEAE DNA AMP PROB: CPT | Performed by: PHYSICIAN ASSISTANT

## 2023-01-16 PROCEDURE — 96374 THER/PROPH/DIAG INJ IV PUSH: CPT

## 2023-01-16 PROCEDURE — 87210 SMEAR WET MOUNT SALINE/INK: CPT | Performed by: PHYSICIAN ASSISTANT

## 2023-01-16 PROCEDURE — 96375 TX/PRO/DX INJ NEW DRUG ADDON: CPT

## 2023-01-16 PROCEDURE — 81001 URINALYSIS AUTO W/SCOPE: CPT | Performed by: PHYSICIAN ASSISTANT

## 2023-01-16 PROCEDURE — 85025 COMPLETE CBC W/AUTO DIFF WBC: CPT | Performed by: PHYSICIAN ASSISTANT

## 2023-01-16 PROCEDURE — 25500020 PHARM REV CODE 255: Performed by: PHYSICIAN ASSISTANT

## 2023-01-16 PROCEDURE — 80053 COMPREHEN METABOLIC PANEL: CPT | Performed by: PHYSICIAN ASSISTANT

## 2023-01-16 RX ORDER — KETOROLAC TROMETHAMINE 30 MG/ML
15 INJECTION, SOLUTION INTRAMUSCULAR; INTRAVENOUS
Status: COMPLETED | OUTPATIENT
Start: 2023-01-16 | End: 2023-01-16

## 2023-01-16 RX ADMIN — IOPAMIDOL 100 ML: 755 INJECTION, SOLUTION INTRAVENOUS at 10:01

## 2023-01-16 RX ADMIN — KETOROLAC TROMETHAMINE 15 MG: 30 INJECTION, SOLUTION INTRAMUSCULAR at 09:01

## 2023-01-17 VITALS
RESPIRATION RATE: 20 BRPM | TEMPERATURE: 98 F | BODY MASS INDEX: 36.88 KG/M2 | HEART RATE: 82 BPM | HEIGHT: 67 IN | OXYGEN SATURATION: 97 % | DIASTOLIC BLOOD PRESSURE: 93 MMHG | SYSTOLIC BLOOD PRESSURE: 120 MMHG | WEIGHT: 235 LBS

## 2023-01-17 PROCEDURE — 63600175 PHARM REV CODE 636 W HCPCS: Performed by: PHYSICIAN ASSISTANT

## 2023-01-17 RX ORDER — ONDANSETRON 4 MG/1
4 TABLET, ORALLY DISINTEGRATING ORAL EVERY 6 HOURS PRN
Qty: 10 TABLET | Refills: 0 | Status: SHIPPED | OUTPATIENT
Start: 2023-01-17 | End: 2023-01-22

## 2023-01-17 RX ORDER — MORPHINE SULFATE 2 MG/ML
4 INJECTION, SOLUTION INTRAMUSCULAR; INTRAVENOUS ONCE
Status: COMPLETED | OUTPATIENT
Start: 2023-01-17 | End: 2023-01-17

## 2023-01-17 RX ORDER — ONDANSETRON 2 MG/ML
4 INJECTION INTRAMUSCULAR; INTRAVENOUS
Status: COMPLETED | OUTPATIENT
Start: 2023-01-17 | End: 2023-01-17

## 2023-01-17 RX ORDER — HYDROCODONE BITARTRATE AND ACETAMINOPHEN 7.5; 325 MG/1; MG/1
1 TABLET ORAL EVERY 6 HOURS PRN
Qty: 12 TABLET | Refills: 0 | Status: SHIPPED | OUTPATIENT
Start: 2023-01-17 | End: 2023-01-20

## 2023-01-17 RX ORDER — NAPROXEN 500 MG/1
500 TABLET ORAL 2 TIMES DAILY PRN
Qty: 20 TABLET | Refills: 0 | Status: SHIPPED | OUTPATIENT
Start: 2023-01-17 | End: 2023-01-27

## 2023-01-17 RX ADMIN — MORPHINE SULFATE 4 MG: 2 INJECTION, SOLUTION INTRAMUSCULAR; INTRAVENOUS at 12:01

## 2023-01-17 RX ADMIN — ONDANSETRON 4 MG: 2 INJECTION INTRAMUSCULAR; INTRAVENOUS at 12:01

## 2023-01-17 NOTE — ED PROVIDER NOTES
Encounter Date: 1/16/2023       History     Chief Complaint   Patient presents with    Abdominal Pain     Right  lower  abd  pain.  Describes  as  intermittent  ,  sharp  and  stabbing.  Hx. Of  ovarian  cyst     Patient is a 22 year old female with a hx of ovarian cyst who presents to the emergency department with complaints of right lower abdominal pain that suddenly began a few hours prior to arrival.   She describes the pain as intermittent, sharp and stabbing, rating pain at 8/10.  She denies nausea, vomiting, SOB, CP, headache, diarrhea, vaginal discharge.  She is currently on her menstrual cycle.      The history is provided by the patient. No  was used.   Abdominal Pain  The current episode started 3 to 5 hours ago. The onset of the illness was abrupt. The abdominal pain is located in the RLQ. The abdominal pain does not radiate. The severity of the abdominal pain is 8/10. The abdominal pain is relieved by certain positions. The other symptoms of the illness do not include fever, shortness of breath, nausea, vomiting, diarrhea, dysuria or vaginal discharge.   Additional symptoms associated with the illness include back pain (lower). Symptoms associated with the illness do not include chills.   Review of patient's allergies indicates:   Allergen Reactions    Penicillins      Unknown: Was told by mother that she was allergic     No past medical history on file.  No past surgical history on file.  Family History   Family history unknown: Yes     Social History     Tobacco Use    Smoking status: Every Day     Types: Vaping with nicotine    Smokeless tobacco: Current   Substance Use Topics    Alcohol use: Not Currently    Drug use: Yes     Types: Marijuana     Review of Systems   Constitutional:  Negative for chills and fever.   Respiratory:  Negative for cough and shortness of breath.    Cardiovascular:  Negative for chest pain and palpitations.   Gastrointestinal:  Positive for abdominal  pain (RLQ). Negative for diarrhea, nausea and vomiting.   Genitourinary:  Negative for decreased urine volume, dysuria, vaginal discharge and vaginal pain.   Musculoskeletal:  Positive for back pain (lower).   Skin:  Negative for rash and wound.   Neurological:  Negative for dizziness and light-headedness.   Hematological:  Negative for adenopathy. Does not bruise/bleed easily.     Physical Exam     Initial Vitals   BP Pulse Resp Temp SpO2   01/16/23 1948 01/16/23 1948 01/16/23 1948 01/16/23 1957 01/16/23 1948   (!) 120/90 69 20 98.3 °F (36.8 °C) 99 %      MAP       --                Physical Exam    Nursing note and vitals reviewed.  Constitutional: She appears well-developed and well-nourished.   HENT:   Head: Normocephalic and atraumatic.   Nose: Nose normal.   Mouth/Throat: Oropharynx is clear and moist.   Eyes: Conjunctivae are normal.   Neck: Neck supple.   Normal range of motion.  Cardiovascular:  Normal rate, normal heart sounds and intact distal pulses.           Pulmonary/Chest: Breath sounds normal.   Abdominal: Abdomen is soft. Bowel sounds are normal. There is abdominal tenderness (RLQ). There is no rebound and no guarding.   Musculoskeletal:         General: Normal range of motion.      Cervical back: Normal range of motion and neck supple.     Neurological: She is alert.   Skin: Skin is warm. Capillary refill takes less than 2 seconds.       ED Course   Procedures  Labs Reviewed   WET PREP, GENITAL - Abnormal; Notable for the following components:       Result Value    WBC, Wet Prep Few (*)     All other components within normal limits   COMPREHENSIVE METABOLIC PANEL - Abnormal; Notable for the following components:    Alkaline Phosphatase 36 (*)     All other components within normal limits   URINALYSIS, REFLEX TO URINE CULTURE - Abnormal; Notable for the following components:    Color, UA Colorless (*)     Blood, UA 3+ (*)     Bacteria, UA Trace (*)     Squamous Epithelial Cells, UA Trace (*)      All other components within normal limits   CBC WITH DIFFERENTIAL - Abnormal; Notable for the following components:    Hct 36.9 (*)     MPV 10.5 (*)     All other components within normal limits   CHLAMYDIA/GONORRHOEAE(GC), PCR - Normal    Narrative:     The Xpert CT/NG test, performed on the GeneXpert system is a qualitative in vitro real-time polymerase chain reaction (PCR) test for the automated detected and differentiation for genomic DNA from Chlamydia trachomatis (CT) and/or Neisseria gonorrhoeae (NG).   CBC W/ AUTO DIFFERENTIAL    Narrative:     The following orders were created for panel order CBC Auto Differential.  Procedure                               Abnormality         Status                     ---------                               -----------         ------                     CBC with Differential[[id number]]        Abnormal            Final result                 Please view results for these tests on the individual orders.   POCT URINE PREGNANCY          Imaging Results              US Pelvis Comp with Transvag NON-OB (xpd (Final result)  Result time 01/17/23 09:22:08      Final result by Michelle Delgado MD (01/17/23 09:22:08)                   Impression:      Indeterminate cystic lesion anterior to the uterus with questionable nodular mural component.  This is separate from the ovaries and is of uncertain etiology.    No significant discrepancy from preliminary report.      Electronically signed by: Michelle Delgado  Date:    01/17/2023  Time:    09:22               Narrative:    EXAMINATION:  US PELVIS COMP WITH TRANSVAG NON-OB (XPD)    CLINICAL HISTORY:  right lower abdominal/pelvic pain, rule out ovarian torsion;    TECHNIQUE:  Transabdominal sonography of the pelvis was performed, followed by transvaginal sonography to better evaluate the uterus and ovaries.    COMPARISON:  CT abdomen pelvis 01/16/2023    FINDINGS:  UTERUS/CERVIX:    Size: 7.3 x 5.6 x 4.8 cm    Masses: There is an  anechoic, cystic 5.2 x 4.9 x 4.2 cm lesion abutting the anterior wall of the uterus, cranial to the urinary bladder.  This corresponds with abnormality seen on CT exam.  Questionable nodular mural component measuring 9 mm.    Endometrium: 7 mm.    RIGHT OVARY:    Size: 3.1 x 2.2 x 2.2 cm    Appearance: Normal sonographic appearance.    Vascular flow: Normal spectral waveforms.    LEFT OVARY:    Size: 3.4 x 2.4 x 2.2 cm    Appearance: Normal sonographic appearance.    Vascular Flow: Normal spectral waveforms.    FREE FLUID:    None                        Preliminary result by Michelle Delgado MD (01/17/23 02:50:47)                   Narrative:    START OF REPORT:  Technique: Transabdominal and transvaginal ultrasound of the pelvis was performed.    CLINICAL HISTORY: Rt.Lower pelvic pain.    Findings:  Uterus: The uterus measures 7.3 cm in sagittal dimension by 4.8 cm in transverse dimension by 5.6 cm in AP dimension. The endometrium measures 7 mm in thickness.  Adnexa: There is a thick walled cystic lesion seen in the left adnexal space, anterior to the uterus, measuring 4.9 x 4.2 x 5.3 cm. This lesion is seen distinct from the ovaries. The lesion is largely avascular with few internal hypoechoic solid appearing areas. These features are suggestive of a paraovarian cyst.  Right Ovary: The right ovary measures 3.1 x 2.2 x 2.2 cm. The right ovarian blood flow is within normal limits.  Left Ovary: The left ovary measures 3.4 x 2.2 x 2.4 cm. The left ovarian blood flow is within normal limits.  Fluid: No free fluid is seen in the pelvis.      Impression:  1. There is a thick walled cystic lesion seen in the left adnexal space, anterior to the uterus, measuring 4.9 x 4.2 x 5.3 cm. This lesion is seen distinct from the ovaries. The lesion is largely avascular with few internal hypoechoic solid appearing areas. These features are suggestive of a paraovarian cyst. Correlate clinically and with laboratory findings as  regards further evaluation and followup.  2. No specific evidence for ovarian torsion is identified. Details and other findings as noted above.                          Preliminary result by Jose Luis Villegas MD (01/17/23 02:50:47)                   Narrative:    START OF REPORT:  Technique: Transabdominal and transvaginal ultrasound of the pelvis was performed.    CLINICAL HISTORY: Rt.Lower pelvic pain.    Findings:  Uterus: The uterus measures 7.3 cm in sagittal dimension by 4.8 cm in transverse dimension by 5.6 cm in AP dimension. The endometrium measures 7 mm in thickness.  Adnexa: There is a thick walled cystic lesion seen in the left adnexal space, anterior to the uterus, measuring 4.9 x 4.2 x 5.3 cm. This lesion is seen distinct from the ovaries. The lesion is largely avascular with few internal hypoechoic solid appearing areas. These features are suggestive of a paraovarian cyst.  Right Ovary: The right ovary measures 3.1 x 2.2 x 2.2 cm. The right ovarian blood flow is within normal limits.  Left Ovary: The left ovary measures 3.4 x 2.2 x 2.4 cm. The left ovarian blood flow is within normal limits.  Fluid: No free fluid is seen in the pelvis.      Impression:  1. There is a thick walled cystic lesion seen in the left adnexal space, anterior to the uterus, measuring 4.9 x 4.2 x 5.3 cm. This lesion is seen distinct from the ovaries. The lesion is largely avascular with few internal hypoechoic solid appearing areas. These features are suggestive of a paraovarian cyst. Correlate clinically and with laboratory findings as regards further evaluation and followup.  2. No specific evidence for ovarian torsion is identified. Details and other findings as noted above.                                         CT Abdomen Pelvis With Contrast (Final result)  Result time 01/17/23 08:16:37   Notes recorded by  Zahira on 1/17/2023 at 10:18 AM CST  Patient is established with GYN has an up coming appointment  scheduled.     Final result by Los Doss MD (01/17/23 08:16:37)                   Impression:    Impression:    1. Mildly enlarged spleen.    2. There is a 5.8 x 4.5 cm thin walled cystic lesion in the midline pelvis anterior to the uterus. The ovaries are separately visualized from this cystic lesion and appear unremarkable. This may reflect a paraovarian cyst. Correlate clinically as regards further evaluation and followup.    3. No acute intraabdominal or pelvic solid organ or bowel pathology identified. Details and findings as discussed.    No significant discrepancy with overnight report      Electronically signed by: Los Doss  Date:    01/17/2023  Time:    08:16               Narrative:      Technique:CT of the abdomen and pelvis was performed with axial images as well as sagittal and coronal reconstruction images with intravenous contrast.    Comparison:None available.    Clinical History:Abdominal Pain (Right lower abd pain. Describes as intermittent , sharp and stabbing. Hx. Of ovarian cyst).    Dosage Information:Automated Exposure Control was utilized 1725.6 mGy.cm.    Findings:    Lungs:The visualized lung bases appear unremarkable.    Pleura:No pleural effusion is seen.    Heart:The heart size is within normal limits.    Abdomen:    Abdominal Wall:No abdominal wall pathology is seen.    Liver:The liver appears unremarkable.    Biliary System:No intrahepatic or extrahepatic biliary duct dilatation is seen.    Gallbladder:The gallbladder appears unremarkable.    Pancreas:The pancreas appears unremarkable.    Spleen:Mildly enlarged spleen with maximum diameter of 15.8 cm.    Adrenals:The adrenal glands appear unremarkable.    Kidneys:The kidneys appear unremarkable with no stones cysts masses or hydronephrosis.    Aorta:The abdominal aorta appears unremarkable.    Bowel:    Esophagus:The visualized esophagus appears unremarkable.    Stomach:The stomach appears  unremarkable.    Duodenum:Unremarkable appearing duodenum.    Small Bowel:The small bowel appears unremarkable.    Colon:Nondistended.    Appendix:The appendix to the extent visualized appears unremarkable (series 2, image 62).    Peritoneum:No intraperitoneal free air or ascites is seen.    Pelvis:    Bladder:The bladder appears unremarkable.    Female:    Uterus:The uterus appears unremarkable.    Ovaries:There is a 5.8 x 4.5 cm thin walled cystic lesion in the midline pelvis anterior to the uterus. The ovaries are separately visualized from this cystic lesion and appear unremarkable.    Bony structures:    Dorsal Spine:Pars interarticularis defects are noted at L5 bilaterally with grade I anterolisthesis of L5 over S1. Degenerative changes are seen at L3-L4 and L4-L5 with mild-to-moderate canal stenosis.    Bony Pelvis:The visualized bony structures of the pelvis appear unremarkable.                        Preliminary result by Los Doss MD (01/17/23 00:26:17)                   Narrative:    START OF REPORT:  Technique: CT of the abdomen and pelvis was performed with axial images as well as sagittal and coronal reconstruction images with intravenous contrast.    Comparison: None available.    Clinical History: Abdominal Pain (Right lower abd pain. Describes as intermittent , sharp and stabbing. Hx. Of ovarian cyst).    Dosage Information: Automated Exposure Control was utilized 1725.6 mGy.cm.    Findings:  Lines and Tubes: None.  Thorax:  Lungs: The visualized lung bases appear unremarkable.  Pleura: No pleural effusion is seen.  Heart: The heart size is within normal limits.  Abdomen:  Abdominal Wall: No abdominal wall pathology is seen.  Liver: The liver appears unremarkable.  Biliary System: No intrahepatic or extrahepatic biliary duct dilatation is seen.  Gallbladder: The gallbladder appears unremarkable.  Pancreas: The pancreas appears unremarkable.  Spleen: Mildly enlarged spleen.  Adrenals: The  adrenal glands appear unremarkable.  Kidneys: The kidneys appear unremarkable with no stones cysts masses or hydronephrosis.  Aorta: The abdominal aorta appears unremarkable.  IVC: Unremarkable.  Bowel:  Esophagus: The visualized esophagus appears unremarkable.  Stomach: The stomach appears unremarkable.  Duodenum: Unremarkable appearing duodenum.  Small Bowel: The small bowel appears unremarkable.  Colon: Nondistended.  Appendix: The appendix to the extent visualized appears unremarkable (series 2, image 62).  Peritoneum: No intraperitoneal free air or ascites is seen.    Pelvis:  Bladder: The bladder appears unremarkable.  Female:  Uterus: The uterus appears unremarkable.  Ovaries: There is a 5.8 x 4.5 cm thin walled cystic lesion in the midline pelvis anterior to the uterus. The ovaries are separately visualized from this cystic lesion and appear unremarkable.    Bony structures:  Dorsal Spine: Pars interarticularis defects are noted at L5 bilaterally with grade I anterolisthesis of L5 over S1. Degenerative changes are seen at L3-L4 and L4-L5 with mild-to-moderate canal stenosis.  Bony Pelvis: The visualized bony structures of the pelvis appear unremarkable.      Impression:  1. Mildly enlarged spleen.  2. There is a 5.8 x 4.5 cm thin walled cystic lesion in the midline pelvis anterior to the uterus. The ovaries are separately visualized from this cystic lesion and appear unremarkable. This may reflect a paraovarian cyst. Correlate clinically as regards further evaluation and followup.  3. No acute intraabdominal or pelvic solid organ or bowel pathology identified. Details and findings as discussed.                          Preliminary result by Jose Luis Villegas MD (01/17/23 00:26:17)                   Narrative:    START OF REPORT:  Technique: CT of the abdomen and pelvis was performed with axial images as well as sagittal and coronal reconstruction images with intravenous contrast.    Comparison: None  available.    Clinical History: Abdominal Pain (Right lower abd pain. Describes as intermittent , sharp and stabbing. Hx. Of ovarian cyst).    Dosage Information: Automated Exposure Control was utilized 1725.6 mGy.cm.    Findings:  Lines and Tubes: None.  Thorax:  Lungs: The visualized lung bases appear unremarkable.  Pleura: No pleural effusion is seen.  Heart: The heart size is within normal limits.  Abdomen:  Abdominal Wall: No abdominal wall pathology is seen.  Liver: The liver appears unremarkable.  Biliary System: No intrahepatic or extrahepatic biliary duct dilatation is seen.  Gallbladder: The gallbladder appears unremarkable.  Pancreas: The pancreas appears unremarkable.  Spleen: Mildly enlarged spleen.  Adrenals: The adrenal glands appear unremarkable.  Kidneys: The kidneys appear unremarkable with no stones cysts masses or hydronephrosis.  Aorta: The abdominal aorta appears unremarkable.  IVC: Unremarkable.  Bowel:  Esophagus: The visualized esophagus appears unremarkable.  Stomach: The stomach appears unremarkable.  Duodenum: Unremarkable appearing duodenum.  Small Bowel: The small bowel appears unremarkable.  Colon: Nondistended.  Appendix: The appendix to the extent visualized appears unremarkable (series 2, image 62).  Peritoneum: No intraperitoneal free air or ascites is seen.    Pelvis:  Bladder: The bladder appears unremarkable.  Female:  Uterus: The uterus appears unremarkable.  Ovaries: There is a 5.8 x 4.5 cm thin walled cystic lesion in the midline pelvis anterior to the uterus. The ovaries are separately visualized from this cystic lesion and appear unremarkable.    Bony structures:  Dorsal Spine: Pars interarticularis defects are noted at L5 bilaterally with grade I anterolisthesis of L5 over S1. Degenerative changes are seen at L3-L4 and L4-L5 with mild-to-moderate canal stenosis.  Bony Pelvis: The visualized bony structures of the pelvis appear unremarkable.      Impression:  1. Mildly  enlarged spleen.  2. There is a 5.8 x 4.5 cm thin walled cystic lesion in the midline pelvis anterior to the uterus. The ovaries are separately visualized from this cystic lesion and appear unremarkable. This may reflect a paraovarian cyst. Correlate clinically as regards further evaluation and followup.  3. No acute intraabdominal or pelvic solid organ or bowel pathology identified. Details and findings as discussed.                                         Medications   ketorolac injection 15 mg (15 mg Intravenous Given 1/16/23 2145)   iopamidoL (ISOVUE-370) injection 100 mL (100 mLs Intravenous Given 1/16/23 2223)   morphine injection 4 mg (4 mg Intravenous Given 1/17/23 0019)   ondansetron injection 4 mg (4 mg Intravenous Given 1/17/23 0024)     Medical Decision Making:   Initial Assessment:   RLQ pain, will order CT abdomen and pelvis to Rule out appendicitis and get an initial evaluation of ovaries.   Will consider Pelvic US depending on CT read and reassessment of patient.    Clinical Tests:   Lab Tests: Ordered and Reviewed  Radiological Study: Ordered and Reviewed  ED Management:  CT:   1. Mildly enlarged spleen.  2. There is a 5.8 x 4.5 cm thin walled cystic lesion in the midline pelvis anterior to the uterus. The ovaries are separately visualized from this cystic lesion and appear unremarkable. This may reflect a paraovarian cyst. Correlate clinically as regards further evaluation and followup.  3. No acute intraabdominal or pelvic solid organ or bowel pathology identified. Details and findings as discussed.       Will get US pelvis to rule out ovarian torsion           ED Course as of 01/17/23 1421   Mon Jan 16, 2023   2347 CT Abdomen Pelvis With Contrast  1. Mildly enlarged spleen.  2. There is a 5.8 x 4.5 cm thin walled cystic lesion in the midline pelvis anterior to the uterus. The ovaries are separately visualized from this cystic lesion and appear unremarkable. This may reflect a paraovarian cyst.  Correlate clinically as regards further evaluation and followup.  3. No acute intraabdominal or pelvic solid organ or bowel pathology identified. Details and findings as discussed. [ER]   Tue Jan 17, 2023   4695 I transitioned this patient to Dr. Boothe at this time, awaiting US results.   [ER]   0225 Albumin/Globulin Ratio: 1.3 [MW]   0225 BUN: 8.2 [MW]   0226 On review the pelvic ultrasound, patient has a thick-walled cystic lesion in the left adnexal region that is anterior to the uterus and is distinct from the ovaries.  Initially ultrasound order to rule out ovarian torsion.  Appears to have good flow to both ovaries.  Stable for discharge to home.  Will refer to gyn.  ER precautions given for any acute worsening. [MW]      ED Course User Index  [ER] TALITA Vogel  [MW] Gian Boothe MD                 Clinical Impression:   Final diagnoses:  [N94.9] Adnexal cyst (Primary)        ED Disposition Condition    Discharge Stable          ED Prescriptions       Medication Sig Dispense Start Date End Date Auth. Provider    naproxen (NAPROSYN) 500 MG tablet Take 1 tablet (500 mg total) by mouth 2 (two) times daily as needed (pain). 20 tablet 1/17/2023 1/27/2023 Gian Boothe MD    HYDROcodone-acetaminophen (NORCO) 7.5-325 mg per tablet Take 1 tablet by mouth every 6 (six) hours as needed for Pain. 12 tablet 1/17/2023 1/20/2023 Gian Boothe MD    ondansetron (ZOFRAN-ODT) 4 MG TbDL Take 1 tablet (4 mg total) by mouth every 6 (six) hours as needed (nausea vomiting). 10 tablet 1/17/2023 1/22/2023 Gian Boothe MD          Follow-up Information       Follow up With Specialties Details Why Contact Info    [facility] Emergency Medicine   [address] [facility] [id number] [phone]    [facility] Gynecology   [address] [facility] [address]             Gian Boothe MD  01/17/23 1602        TALITA Vogel  01/17/23 8479

## 2023-01-30 ENCOUNTER — OFFICE VISIT (OUTPATIENT)
Dept: GYNECOLOGY | Facility: CLINIC | Age: 23
End: 2023-01-30
Payer: MEDICAID

## 2023-01-30 ENCOUNTER — TELEPHONE (OUTPATIENT)
Dept: GYNECOLOGY | Facility: CLINIC | Age: 23
End: 2023-01-30

## 2023-01-30 VITALS
SYSTOLIC BLOOD PRESSURE: 122 MMHG | OXYGEN SATURATION: 99 % | BODY MASS INDEX: 38.77 KG/M2 | DIASTOLIC BLOOD PRESSURE: 82 MMHG | HEIGHT: 67 IN | WEIGHT: 247 LBS | HEART RATE: 70 BPM | RESPIRATION RATE: 20 BRPM | TEMPERATURE: 98 F

## 2023-01-30 DIAGNOSIS — R19.00 PELVIC MASS: Primary | ICD-10-CM

## 2023-01-30 DIAGNOSIS — Z12.4 SCREENING FOR MALIGNANT NEOPLASM OF CERVIX: Primary | ICD-10-CM

## 2023-01-30 DIAGNOSIS — N94.9 ADNEXAL CYST: ICD-10-CM

## 2023-01-30 PROCEDURE — 99214 OFFICE O/P EST MOD 30 MIN: CPT | Mod: PBBFAC

## 2023-01-30 RX ORDER — ONDANSETRON 4 MG/1
8 TABLET, ORALLY DISINTEGRATING ORAL 2 TIMES DAILY
COMMUNITY
End: 2023-08-15

## 2023-01-30 RX ORDER — NAPROXEN 500 MG/1
500 TABLET ORAL 2 TIMES DAILY
COMMUNITY
End: 2023-08-15

## 2023-01-30 RX ORDER — HYDROCODONE BITARTRATE AND ACETAMINOPHEN 7.5; 325 MG/1; MG/1
1 TABLET ORAL EVERY 6 HOURS PRN
COMMUNITY
End: 2023-08-15

## 2023-01-30 RX ORDER — DOCUSATE SODIUM 100 MG/1
100 CAPSULE, LIQUID FILLED ORAL DAILY PRN
Qty: 30 CAPSULE | Refills: 1 | Status: SHIPPED | OUTPATIENT
Start: 2023-01-30 | End: 2023-02-15 | Stop reason: CLARIF

## 2023-01-30 NOTE — PROGRESS NOTES
[facility] CLINIC NOTE  [facility]  [address] [place] [address]  Phone: [phone]  Fax: [phone]    Subjective:     Aleksandra Smith is a 22 y.o. G0 who presents to clinic for assessment of pelvic cystic mass.    Patient was initially seen in the ED 2 weeks ago with complaints of lower abdominal discomfort and was noted to have 5cm cystic structure abutting the anterior wall of the uterus. Reports intermittent periumbilical discomfort often associated with constipation discomfort. Denies any recent lower abdominal or pelvic discomfort. Tolerating regular diet without any nausea or vomiting. Denies any dysuria. Denies any irregular vaginal bleeding or abnormal discharge. Sexually active with one male partner, not on any contraception, and does not use barrier protection.     Allergies: PCN (Unsure of allergic response, happened as infant and mother told her she was allergic)  OBHx:G0  GynHx:   Irregular menses. Reports having cyclical cycles each month but will occassionally skip a month  LMP: 1/16/2023  Denies Hx of STIs. Unsure of prior pap smear  Contraception: N/A    MedHx:   History reviewed. No pertinent past medical history.    SurgHx:   History reviewed. No pertinent surgical history.    Medications:     Current Outpatient Medications:     azithromycin (ZITHROMAX Z-CARMELITA) 250 MG tablet, Take 2 tablets (500 mg) on  Day 1,  followed by 1 tablet (250 mg) once daily on Days 2 through 5. (Patient not taking: Reported on 10/26/2022), Disp: 6 tablet, Rfl: 0    docusate sodium (COLACE) 100 MG capsule, Take 1 capsule (100 mg total) by mouth daily as needed for Constipation., Disp: 30 capsule, Rfl: 1    fluticasone propionate (FLONASE) 50 mcg/actuation nasal spray, 1 spray (50 mcg total) by Each Nostril route once daily. (Patient not taking: Reported on 10/26/2022), Disp: 18.2 mL, Rfl: 3    HYDROcodone-acetaminophen (NORCO) 7.5-325 mg per tablet, Take 1 tablet by mouth every 6 (six) hours as needed for Pain.,  "Disp: , Rfl:     loratadine (CLARITIN) 10 mg tablet, Take 1 tablet (10 mg total) by mouth once daily. (Patient not taking: Reported on 1/30/2023), Disp: 30 tablet, Rfl: 3    naproxen (EC NAPROSYN) 500 MG EC tablet, Take 500 mg by mouth 2 (two) times daily., Disp: , Rfl:     ondansetron (ZOFRAN-ODT) 4 MG TbDL, Take 8 mg by mouth 2 (two) times daily., Disp: , Rfl:     FM Hx: Denies hx of ovarian, uterine, endometrial, or colon cancer. Denies history of bleeding or coagulation disorders.  Family History   Family history unknown: Yes       Social Hx:   Social History     Socioeconomic History    Marital status: Single   Tobacco Use    Smoking status: Every Day     Types: Vaping with nicotine    Smokeless tobacco: Current   Substance and Sexual Activity    Alcohol use: Not Currently    Drug use: Yes     Types: Marijuana    Sexual activity: Yes     Partners: Male       Review of Systems  Denies fevers, chills, headache, blurry vision, nausea, vomiting, dizziness, or syncope.   Denies chest pain, shortness of breath, RUQ pain, or calf pain.    Objective:     Vitals:    01/30/23 0855   BP: 122/82   Pulse: 70   Resp: 20   Temp: 98.2 °F (36.8 °C)   SpO2: 99%   Weight: 112 kg (247 lb)   Height: 5' 7" (1.702 m)     Body mass index is 38.69 kg/m².    Physical Exam:   General: alert and oriented, in no acute distress  Lungs: clear to auscultation bilaterally, no conversational dyspnea  Heart: regular rate and rhythm  Abdomen: Soft, non-distended, non tender  Extremities: Normal, atraumatic, non-edematous  External genitalia: Normal female genitalia without lesion, discharge or tenderness. Normal appearing urethral meatus. Normal appearing external anus.  Bimanual Exam: Uterus 8cm in size, mobile, smooth in contour, no masses. No uterine or cervical motion tenderness. No adnexal fullness/tenderness. Normal urethra. Normal bladder  Speculum Exam: Vaginal mucosa normal in appearance. Pink. No masses/lesions. Cervix well visualized, " smooth in contour no masses or lesions. Os normal in appearance, no blood or discharge coming from the os  Note: RN chaperone present for entirety of exam.     Imaging  No images are attached to the encounter.  Assessment/Plan:    Aleksandra Smith is a 22 y.o. G0 who presents for assessment of incidentally found pelvic cystic mass.     -Imaging results reviewed and discussed with patient  -Pap smear collected today  -Midline appearing pelvic cystic structure that appears to be separate from the ovaries. Unclear etiology based on imaging. Discussed with patient recommendations for diagnostic laparoscopy with possibly excisional procedure. Verbalized understanding. All questions answered. Will send information to pre-op team.    Future Appointments   Date Time Provider Department Center   3/9/2023  9:50 AM BRITT [facility]   4/26/2023  8:30 AM Lidia Cui NP [facility]     Patient and plan were discussed with Dr. Valente.    Ino Waite MD  [facility], PGY-4

## 2023-01-30 NOTE — TELEPHONE ENCOUNTER
Called patient to discuss scheduling surgery. She is to be scheduled for diagnostic laparoscopy, possible excision of pelvic mass on 2/16/23. She will come in for preoperative visit on 2/6/23. Patient states understanding and agrees to plan.     Natasha Hurtado MD  [facility] PGY3

## 2023-02-01 LAB — PSYCHE PATHOLOGY RESULT: NORMAL

## 2023-02-02 ENCOUNTER — TELEPHONE (OUTPATIENT)
Dept: GYNECOLOGY | Facility: CLINIC | Age: 23
End: 2023-02-02

## 2023-02-02 NOTE — TELEPHONE ENCOUNTER
----- Message from Natasha Hurtado MD sent at 1/30/2023  3:53 PM CST -----  Regarding: Please schedule preop visit  Hi,     This patient will be scheduled for diagnostic laparoscopy, possible excision of pelvic mass on 2/16/23. She says preop visit 2/6/23 at 8:30 am would work for her. Will you please make this appt?    Thank you!  Natasha Hurtado MD  [facility] PGY3

## 2023-02-02 NOTE — TELEPHONE ENCOUNTER
Scheduled patient for 2/6/23 at 8:45 AM. Please call patient and remind her of appt date and time. Any further questions or concerns from patient please direct her to me. Thank you.

## 2023-02-05 NOTE — H&P (VIEW-ONLY)
"[facility] Preoperative Visit History and Physical    HPI:   22 y.o. G0 with a history of pelvic pain thought to be related to adnexal mass noted on imaging, presenting for preoperative discussion of diagnostic laparoscopy and possible excision of mass.    Per previous documentation:   "Patient was initially seen in the ED 2 weeks ago with complaints of lower abdominal discomfort and was noted to have 5cm cystic structure abutting the anterior wall of the uterus. Reports intermittent periumbilical discomfort often associated with constipation discomfort. Denies any recent lower abdominal or pelvic discomfort. Tolerating regular diet without any nausea or vomiting. Denies any dysuria. Denies any irregular vaginal bleeding or abnormal discharge. Sexually active with one male partner, not on any contraception, and does not use barrier protection."     PMH:  Class II obesity (BMI 38)    ObHx:  G0    GynHx:  Reports usually having one cycle each month but will occassionally skip a month  LMP: 2023  STIs: chlamydia (), treated; syphilis (diagnosed , states she took pills)  Denies hx of abnormal pap smears  Contraception: None     SurgHx:  Denies    FamHx:  Grandmother with melanoma  Grandfather with prostate cancer  Denies history of breast, ovarian, endometrial, colon cancers.    SocialHx:  Tobacco use: 6 cigarettes/day, for the past 5 years  Social alcohol use  Marijuana use: several nights per week, to help with sleep  Denies illicit drug use.    All:  PCN (Unsure of allergic response, happened as infant and mother told her she was allergic)    Meds:  None    OB History    Para Term  AB Living   0 0 0 0 0 0   SAB IAB Ectopic Multiple Live Births   0 0 0 0 0     History reviewed. No pertinent past medical history.  History reviewed. No pertinent surgical history.  Prior to Admission medications    Medication Sig Start Date End Date Taking? Authorizing Provider   azithromycin (ZITHROMAX " Test results normal  He has some diverticulosis, this is pretty normal for his age. Just increase his fiber and his water intake "Z-CARMELITA) 250 MG tablet Take 2 tablets (500 mg) on  Day 1,  followed by 1 tablet (250 mg) once daily on Days 2 through 5.  Patient not taking: Reported on 10/26/2022 7/24/22   BRTIT [place]   docusate sodium (COLACE) 100 MG capsule Take 1 capsule (100 mg total) by mouth daily as needed for Constipation. 1/30/23 1/30/24  Ino Waite MD   fluticasone propionate (FLONASE) 50 mcg/actuation nasal spray 1 spray (50 mcg total) by Each Nostril route once daily.  Patient not taking: Reported on 10/26/2022 7/11/22   Lidia Cui NP   HYDROcodone-acetaminophen (NORCO) 7.5-325 mg per tablet Take 1 tablet by mouth every 6 (six) hours as needed for Pain.    Historical Provider   loratadine (CLARITIN) 10 mg tablet Take 1 tablet (10 mg total) by mouth once daily.  Patient not taking: Reported on 1/30/2023 7/11/22   Lidia Cui NP   naproxen (EC NAPROSYN) 500 MG EC tablet Take 500 mg by mouth 2 (two) times daily.    Historical Provider   ondansetron (ZOFRAN-ODT) 4 MG TbDL Take 8 mg by mouth 2 (two) times daily.    Historical Provider     Review of patient's allergies indicates:   Allergen Reactions    Penicillins      Unknown: Was told by mother that she was allergic     Social History     Socioeconomic History    Marital status: Single   Tobacco Use    Smoking status: Every Day     Types: Vaping with nicotine    Smokeless tobacco: Current   Substance and Sexual Activity    Alcohol use: Yes     Comment: occasionally    Drug use: Yes     Types: Marijuana    Sexual activity: Yes     Partners: Male     Family History   Problem Relation Age of Onset    Diabetes Maternal Grandmother     Diabetes Maternal Grandfather     Hypertension Mother        Review of Systems: As stated in HPI.      Objective:     Vitals:    02/06/23 0841   BP: 121/86   Pulse: 64   Resp: 18   Temp: 97.5 °F (36.4 °C)   TempSrc: Oral   SpO2: 99%   Weight: 111.9 kg (246 lb 9.6 oz)   Height: 5' 7" (1.702 m)     Body mass index is 38.62 " kg/m².    Physical Exam per Dr. Waite 1/2023:   General: alert and oriented, in no acute distress  Lungs: clear to auscultation bilaterally, no conversational dyspnea  Heart: regular rate and rhythm  Abdomen: Soft, non-distended, non tender  Extremities: Normal, atraumatic, non-edematous  External genitalia: Normal female genitalia without lesion, discharge or tenderness. Normal appearing urethral meatus. Normal appearing external anus.  Bimanual Exam: Uterus 8cm in size, mobile, smooth in contour, no masses. No uterine or cervical motion tenderness. No adnexal fullness/tenderness. Normal urethra. Normal bladder  Speculum Exam: Vaginal mucosa normal in appearance. Pink. No masses/lesions. Cervix well visualized, smooth in contour no masses or lesions. Os normal in appearance, no blood or discharge coming from the os  Note: RN chaperone present for entirety of exam.    LABS:  Last mammogram: N/A  Last pap: 1/2023 NILM  EMB: N/A  Colonoscopy: N/A  Last CBC: 1/16/23: 9.3>12.9/36.9<252  Last Cr : 1/16/23: 0.74    IMAGING:  CTAP 1/17/23:  Female:  Uterus:The uterus appears unremarkable.  Ovaries:There is a 5.8 x 4.5 cm thin walled cystic lesion in the midline pelvis anterior to the uterus. The ovaries are separately visualized from this cystic lesion and appear unremarkable.    TVUS 1/17/23:   FINDINGS:  UTERUS/CERVIX:  Size: 7.3 x 5.6 x 4.8 cm  Masses: There is an anechoic, cystic 5.2 x 4.9 x 4.2 cm lesion abutting the anterior wall of the uterus, cranial to the urinary bladder.  This corresponds with abnormality seen on CT exam.  Questionable nodular mural component measuring 9 mm.  Endometrium: 7 mm  RIGHT OVARY:  Size: 3.1 x 2.2 x 2.2 cm  Appearance: Normal sonographic appearance.  Vascular flow: Normal spectral waveforms.  LEFT OVARY:  Size: 3.4 x 2.4 x 2.2 cm  Appearance: Normal sonographic appearance.  Vascular Flow: Normal spectral waveforms.  FREE FLUID:  None     Impression:  Indeterminate cystic lesion  anterior to the uterus with questionable nodular mural component.  This is separate from the ovaries and is of uncertain etiology.  No significant discrepancy from preliminary report.  Electronically signed by: Michelle Delgado  Date:                                            01/17/2023  Time:                                           09:22    Assessment:      22 y.o. G0 with adnexal cystic mass for definitive surgical management/evaluation. Plan for diagnostic laparoscopy, possible salpingectomy, oophorectomy, cystectomy, or excision of other pelvic mass, and chromopertubation.    1. Adnexal mass        2. Preoperative exam for gynecologic surgery             Plan:     Counseling: Alternatives to this planned procedure were explained to the patient including expectant, medical and other types of surgical management. She was counseled that imaging findings and history are most consistent with hydrosalpinx, with plan for removing hydrosalpinx and leaving remaining tube. She was counseled on possibility of ovarian cyst or ovarian abnormality, possibly necessitating removal of cyst or one ovary. Discussed with patient low concern for ovarian malignancy, but if ovarian malignancy is encountered, spillage would upstage a malignancy. This procedure and its risks, reasons, benefits and complications (including injury to bowel, bladder, major blood vessel, ureter, bleeding, possibility of transfusion, infection, scarring, dyspareunia, further surgery, failure of the procedure or fistula formation) were reviewed in detail.     We discussed with her the impact of unilateral salpingectomy or unilateral salpingoophorectomy on her future fertility. Offered chromopertubation to evaluate patency of other tube. Discussed possibility of finding bilateral blockage/hydrosalpinx, and that this would mean she would likely need IVF to achieve pregnancy. Discussed risks of intraoperative infection. Patient states understanding and would  like to proceed with chromopertubation.  We have reviewed the typical perioperative course with hospital stay, and post-operative precautions and restrictions have been reviewed.    Plan for doxycycline intraoperatively, and 5 day course of doxycycline 100mg BID after surgery to prevent pelvic infection after chromopertubation.  Patient counseled on the fact that cystotomy complicates approximately 0.3 to 11/1000 benign gynecologic surgeries, especially urogynecologic procedures and hysterectomy.  Patient is aware that, in the event of cystotomy, continuous bladder drainage will be continued for 7-10 days with Paz catheter in place.   Counseled on ureteral injury rates of 0.2-7.3%, bowel injury rates of <1%.   Transfusion of blood and blood products discussed. Patient was consented for blood transfusion in the case of an emergency.  She understands the possibility of blood transfusion reaction and the attendant risk of transmission of HIV & Hepatitis C to be 1 in 2 million and the risk of Hepatitis B to be 1 in 200,000.  She is aware of the possibility of transfusion reaction. All questions were answered.   Patient understands we are affiliated with a teaching institution and residents and medical students will be involved in her care.  She has consented to an exam under anesthesia and understands that medical students participate in this portion of the procedure.  All questions were answered.    Preop testing ordered.  Surgery case requested. Surgical consents signed.  Instructions reviewed, including NPO after midnight.   Counseled to hold the following medications on the day of surgery: None   PAT appointment: requested   Current contraception: None, beta hcg day of surgery    To OR for diagnostic laparoscopy, possible salpingectomy, chromopertubation, possible ovarian cystectomy, possible oopherectomy, possible excision of pelvic mass with Dr. Valente.   Scheduled on 2/16/23    Discussed with   Ambrosio.    Natasha Hurtado MD  [facility] PGY3

## 2023-02-05 NOTE — PROGRESS NOTES
"[facility] Preoperative Visit History and Physical    HPI:   22 y.o. G0 with a history of pelvic pain thought to be related to adnexal mass noted on imaging, presenting for preoperative discussion of diagnostic laparoscopy and possible excision of mass.    Per previous documentation:   "Patient was initially seen in the ED 2 weeks ago with complaints of lower abdominal discomfort and was noted to have 5cm cystic structure abutting the anterior wall of the uterus. Reports intermittent periumbilical discomfort often associated with constipation discomfort. Denies any recent lower abdominal or pelvic discomfort. Tolerating regular diet without any nausea or vomiting. Denies any dysuria. Denies any irregular vaginal bleeding or abnormal discharge. Sexually active with one male partner, not on any contraception, and does not use barrier protection."     PMH:  Class II obesity (BMI 38)    ObHx:  G0    GynHx:  Reports usually having one cycle each month but will occassionally skip a month  LMP: 2023  STIs: chlamydia (), treated; syphilis (diagnosed , states she took pills)  Denies hx of abnormal pap smears  Contraception: None     SurgHx:  Denies    FamHx:  Grandmother with melanoma  Grandfather with prostate cancer  Denies history of breast, ovarian, endometrial, colon cancers.    SocialHx:  Tobacco use: 6 cigarettes/day, for the past 5 years  Social alcohol use  Marijuana use: several nights per week, to help with sleep  Denies illicit drug use.    All:  PCN (Unsure of allergic response, happened as infant and mother told her she was allergic)    Meds:  None    OB History    Para Term  AB Living   0 0 0 0 0 0   SAB IAB Ectopic Multiple Live Births   0 0 0 0 0     History reviewed. No pertinent past medical history.  History reviewed. No pertinent surgical history.  Prior to Admission medications    Medication Sig Start Date End Date Taking? Authorizing Provider   azithromycin (ZITHROMAX " "Z-CARMELITA) 250 MG tablet Take 2 tablets (500 mg) on  Day 1,  followed by 1 tablet (250 mg) once daily on Days 2 through 5.  Patient not taking: Reported on 10/26/2022 7/24/22   BRITT Hamilton   docusate sodium (COLACE) 100 MG capsule Take 1 capsule (100 mg total) by mouth daily as needed for Constipation. 1/30/23 1/30/24  Ino Waite MD   fluticasone propionate (FLONASE) 50 mcg/actuation nasal spray 1 spray (50 mcg total) by Each Nostril route once daily.  Patient not taking: Reported on 10/26/2022 7/11/22   Lidia Cui NP   HYDROcodone-acetaminophen (NORCO) 7.5-325 mg per tablet Take 1 tablet by mouth every 6 (six) hours as needed for Pain.    Historical Provider   loratadine (CLARITIN) 10 mg tablet Take 1 tablet (10 mg total) by mouth once daily.  Patient not taking: Reported on 1/30/2023 7/11/22   Lidia Cui NP   naproxen (EC NAPROSYN) 500 MG EC tablet Take 500 mg by mouth 2 (two) times daily.    Historical Provider   ondansetron (ZOFRAN-ODT) 4 MG TbDL Take 8 mg by mouth 2 (two) times daily.    Historical Provider     Review of patient's allergies indicates:   Allergen Reactions    Penicillins      Unknown: Was told by mother that she was allergic     Social History     Socioeconomic History    Marital status: Single   Tobacco Use    Smoking status: Every Day     Types: Vaping with nicotine    Smokeless tobacco: Current   Substance and Sexual Activity    Alcohol use: Yes     Comment: occasionally    Drug use: Yes     Types: Marijuana    Sexual activity: Yes     Partners: Male     Family History   Problem Relation Age of Onset    Diabetes Maternal Grandmother     Diabetes Maternal Grandfather     Hypertension Mother        Review of Systems: As stated in HPI.      Objective:     Vitals:    02/06/23 0841   BP: 121/86   Pulse: 64   Resp: 18   Temp: 97.5 °F (36.4 °C)   TempSrc: Oral   SpO2: 99%   Weight: 111.9 kg (246 lb 9.6 oz)   Height: 5' 7" (1.702 m)     Body mass index is 38.62 " kg/m².    Physical Exam per Dr. Waite 1/2023:   General: alert and oriented, in no acute distress  Lungs: clear to auscultation bilaterally, no conversational dyspnea  Heart: regular rate and rhythm  Abdomen: Soft, non-distended, non tender  Extremities: Normal, atraumatic, non-edematous  External genitalia: Normal female genitalia without lesion, discharge or tenderness. Normal appearing urethral meatus. Normal appearing external anus.  Bimanual Exam: Uterus 8cm in size, mobile, smooth in contour, no masses. No uterine or cervical motion tenderness. No adnexal fullness/tenderness. Normal urethra. Normal bladder  Speculum Exam: Vaginal mucosa normal in appearance. Pink. No masses/lesions. Cervix well visualized, smooth in contour no masses or lesions. Os normal in appearance, no blood or discharge coming from the os  Note: RN chaperone present for entirety of exam.    LABS:  Last mammogram: N/A  Last pap: 1/2023 NILM  EMB: N/A  Colonoscopy: N/A  Last CBC: 1/16/23: 9.3>12.9/36.9<252  Last Cr : 1/16/23: 0.74    IMAGING:  CTAP 1/17/23:  Female:  Uterus:The uterus appears unremarkable.  Ovaries:There is a 5.8 x 4.5 cm thin walled cystic lesion in the midline pelvis anterior to the uterus. The ovaries are separately visualized from this cystic lesion and appear unremarkable.    TVUS 1/17/23:   FINDINGS:  UTERUS/CERVIX:  Size: 7.3 x 5.6 x 4.8 cm  Masses: There is an anechoic, cystic 5.2 x 4.9 x 4.2 cm lesion abutting the anterior wall of the uterus, cranial to the urinary bladder.  This corresponds with abnormality seen on CT exam.  Questionable nodular mural component measuring 9 mm.  Endometrium: 7 mm  RIGHT OVARY:  Size: 3.1 x 2.2 x 2.2 cm  Appearance: Normal sonographic appearance.  Vascular flow: Normal spectral waveforms.  LEFT OVARY:  Size: 3.4 x 2.4 x 2.2 cm  Appearance: Normal sonographic appearance.  Vascular Flow: Normal spectral waveforms.  FREE FLUID:  None     Impression:  Indeterminate cystic lesion  anterior to the uterus with questionable nodular mural component.  This is separate from the ovaries and is of uncertain etiology.  No significant discrepancy from preliminary report.  Electronically signed by: Michelle Delgado  Date:                                            01/17/2023  Time:                                           09:22    Assessment:      22 y.o. G0 with adnexal cystic mass for definitive surgical management/evaluation. Plan for diagnostic laparoscopy, possible salpingectomy, oophorectomy, cystectomy, or excision of other pelvic mass, and chromopertubation.    1. Adnexal mass        2. Preoperative exam for gynecologic surgery             Plan:     Counseling: Alternatives to this planned procedure were explained to the patient including expectant, medical and other types of surgical management. She was counseled that imaging findings and history are most consistent with hydrosalpinx, with plan for removing hydrosalpinx and leaving remaining tube. She was counseled on possibility of ovarian cyst or ovarian abnormality, possibly necessitating removal of cyst or one ovary. Discussed with patient low concern for ovarian malignancy, but if ovarian malignancy is encountered, spillage would upstage a malignancy. This procedure and its risks, reasons, benefits and complications (including injury to bowel, bladder, major blood vessel, ureter, bleeding, possibility of transfusion, infection, scarring, dyspareunia, further surgery, failure of the procedure or fistula formation) were reviewed in detail.     We discussed with her the impact of unilateral salpingectomy or unilateral salpingoophorectomy on her future fertility. Offered chromopertubation to evaluate patency of other tube. Discussed possibility of finding bilateral blockage/hydrosalpinx, and that this would mean she would likely need IVF to achieve pregnancy. Discussed risks of intraoperative infection. Patient states understanding and would  like to proceed with chromopertubation.  We have reviewed the typical perioperative course with hospital stay, and post-operative precautions and restrictions have been reviewed.    Plan for doxycycline intraoperatively, and 5 day course of doxycycline 100mg BID after surgery to prevent pelvic infection after chromopertubation.  Patient counseled on the fact that cystotomy complicates approximately 0.3 to 11/1000 benign gynecologic surgeries, especially urogynecologic procedures and hysterectomy.  Patient is aware that, in the event of cystotomy, continuous bladder drainage will be continued for 7-10 days with Paz catheter in place.   Counseled on ureteral injury rates of 0.2-7.3%, bowel injury rates of <1%.   Transfusion of blood and blood products discussed. Patient was consented for blood transfusion in the case of an emergency.  She understands the possibility of blood transfusion reaction and the attendant risk of transmission of HIV & Hepatitis C to be 1 in 2 million and the risk of Hepatitis B to be 1 in 200,000.  She is aware of the possibility of transfusion reaction. All questions were answered.   Patient understands we are affiliated with a teaching institution and residents and medical students will be involved in her care.  She has consented to an exam under anesthesia and understands that medical students participate in this portion of the procedure.  All questions were answered.    Preop testing ordered.  Surgery case requested. Surgical consents signed.  Instructions reviewed, including NPO after midnight.   Counseled to hold the following medications on the day of surgery: None   PAT appointment: requested   Current contraception: None, beta hcg day of surgery    To OR for diagnostic laparoscopy, possible salpingectomy, chromopertubation, possible ovarian cystectomy, possible oopherectomy, possible excision of pelvic mass with Dr. Valente.   Scheduled on 2/16/23    Discussed with   Ambrosio.    Natasha Hurtado MD  [facility]

## 2023-02-06 ENCOUNTER — OFFICE VISIT (OUTPATIENT)
Dept: GYNECOLOGY | Facility: CLINIC | Age: 23
End: 2023-02-06
Payer: MEDICAID

## 2023-02-06 VITALS
TEMPERATURE: 98 F | RESPIRATION RATE: 18 BRPM | SYSTOLIC BLOOD PRESSURE: 121 MMHG | DIASTOLIC BLOOD PRESSURE: 86 MMHG | BODY MASS INDEX: 38.71 KG/M2 | WEIGHT: 246.63 LBS | HEIGHT: 67 IN | HEART RATE: 64 BPM | OXYGEN SATURATION: 99 %

## 2023-02-06 DIAGNOSIS — Z01.818 PREOPERATIVE EXAM FOR GYNECOLOGIC SURGERY: ICD-10-CM

## 2023-02-06 DIAGNOSIS — N94.89 ADNEXAL MASS: Primary | ICD-10-CM

## 2023-02-06 PROCEDURE — 99213 OFFICE O/P EST LOW 20 MIN: CPT | Mod: PBBFAC

## 2023-02-08 ENCOUNTER — TELEPHONE (OUTPATIENT)
Dept: GYNECOLOGY | Facility: CLINIC | Age: 23
End: 2023-02-08
Payer: MEDICAID

## 2023-02-13 ENCOUNTER — PATIENT MESSAGE (OUTPATIENT)
Dept: ADMINISTRATIVE | Facility: OTHER | Age: 23
End: 2023-02-13
Payer: MEDICAID

## 2023-02-14 ENCOUNTER — PATIENT MESSAGE (OUTPATIENT)
Dept: ADMINISTRATIVE | Facility: OTHER | Age: 23
End: 2023-02-14
Payer: MEDICAID

## 2023-02-14 ENCOUNTER — ANESTHESIA EVENT (OUTPATIENT)
Dept: SURGERY | Facility: HOSPITAL | Age: 23
End: 2023-02-14
Payer: MEDICAID

## 2023-02-14 NOTE — ANESTHESIA PREPROCEDURE EVALUATION
"                                                                                                             02/14/2023 April Luis is a 22 y.o., female.    COVID STATUS: NOT VACCINATED  BETA-BLOCKER: NONE    PAT NURSE PHONE INTERVIEW 2/15/23    PROBLEM LIST:  -  ADNEXAL  MASS, PELVIC PAIN  -  OBESITY CLASS II  -  UPT STATUS  -- BHCG NEG   -  ER 12/2/21 w/SYNCOPE; Dx: ATYPICAL SYNCOPE (CT HEAD=NEG)  -  BACK PAIN (INTERMITTENT)      - 1/7/23 CT = Pars interarticularis defects are noted at L5 bilaterally with grade I anterolisthesis of L5 over S1. Degenerative changes are seen at L3-L4 and L4-L5 with mild-to-moderate canal stenosis.  -  SMOKER 2.5 PPY; VAPING w/NICOTINE (LASTLY 5 mos. AGO)  -  ETOH "OCASSIONALLY"  -  MARIJUANA (CURRENT)      Vitals:    02/16/23 0841 02/16/23 0859 02/16/23 0919 02/16/23 1118   BP:  115/71  120/75   Pulse:  67  68   Resp:   20 20   Temp:  36.8 °C (98.3 °F)  36.3 °C (97.3 °F)   TempSrc:  Oral  Temporal   SpO2:  97%  100%   Weight: 107.4 kg (236 lb 12.4 oz)          Lab Results   Component Value Date    WBC 9.3 01/16/2023    HGB 12.9 01/16/2023    HCT 36.9 (L) 01/16/2023     01/16/2023    CHOL 130 07/11/2022    TRIG 92 07/11/2022    HDL 32 (L) 07/11/2022    ALT 32 01/16/2023    AST 24 01/16/2023     01/16/2023    K 3.5 01/16/2023    CREATININE 0.74 01/16/2023    BUN 8.2 01/16/2023    CO2 24 01/16/2023    TSH 2.0514 07/11/2022    HGBA1C 4.6 07/11/2022     AM Rx DOS: FLONASE, NORCO PRN, ZOFRAN PRN    ORDERS -   SURGEON: 12/2/21 EKG; 1/16/23 CBC, CMP; (per GYN: BETA hcG, T&S DOS)  ANESTHESIA: NONE  Pre-op Assessment    I have reviewed the Patient Summary Reports.     I have reviewed the Nursing Notes. I have reviewed the NPO Status.   I have reviewed the Medications.     Review of Systems  Anesthesia Hx:  No problems with previous Anesthesia  History of prior surgery of interest to airway management or planning: Denies Family Hx of Anesthesia complications.   Denies Personal " Hx of Anesthesia complications.   Hematology/Oncology:  Hematology Normal   Oncology Normal     EENT/Dental:EENT/Dental Normal   Cardiovascular:  Cardiovascular Normal     Pulmonary:  Pulmonary Normal    Renal/:  Renal/ Normal     Hepatic/GI:  Hepatic/GI Normal    Musculoskeletal:  Musculoskeletal Normal    Neurological:  Neurology Normal    Endocrine:  Endocrine Normal    Dermatological:  Skin Normal    Psych:  Psychiatric Normal           Physical Exam  General: Well nourished, Cooperative, Alert and Oriented    Airway:  Mallampati: I / I  Mouth Opening: Normal  TM Distance: Normal  Tongue: Normal  Neck ROM: Normal ROM    Dental:  Intact        Anesthesia Plan  Type of Anesthesia, risks & benefits discussed:    Anesthesia Type: Gen ETT  Intra-op Monitoring Plan: Standard ASA Monitors  Post Op Pain Control Plan: multimodal analgesia and IV/PO Opioids PRN  Induction:  IV  Airway Plan: Direct  Informed Consent: Informed consent signed with the Patient and all parties understand the risks and agree with anesthesia plan.  All questions answered. Patient consented to blood products? No  ASA Score: 2  Day of Surgery Review of History & Physical: H&P Update referred to the surgeon/provider.    Ready For Surgery From Anesthesia Perspective.     .   Lab Results   Component Value Date    WBC 9.3 01/16/2023    HGB 12.9 01/16/2023    HCT 36.9 (L) 01/16/2023     01/16/2023    CHOL 130 07/11/2022    TRIG 92 07/11/2022    HDL 32 (L) 07/11/2022    ALT 32 01/16/2023    AST 24 01/16/2023     01/16/2023    K 3.5 01/16/2023    CREATININE 0.74 01/16/2023    BUN 8.2 01/16/2023    CO2 24 01/16/2023    TSH 2.0514 07/11/2022    HGBA1C 4.6 07/11/2022       Lab Results   Component Value Date    WBC 9.3 01/16/2023    HGB 12.9 01/16/2023    HCT 36.9 (L) 01/16/2023    MCV 85.6 01/16/2023     01/16/2023     CMP  Sodium Level   Date Value Ref Range Status   01/16/2023 139 136 - 145 mmol/L Final     Potassium Level   Date  Value Ref Range Status   01/16/2023 3.5 3.5 - 5.1 mmol/L Final     Carbon Dioxide   Date Value Ref Range Status   01/16/2023 24 22 - 29 mmol/L Final     Blood Urea Nitrogen   Date Value Ref Range Status   01/16/2023 8.2 7.0 - 18.7 mg/dL Final     Creatinine   Date Value Ref Range Status   01/16/2023 0.74 0.55 - 1.02 mg/dL Final     Calcium Level Total   Date Value Ref Range Status   01/16/2023 9.1 8.4 - 10.2 mg/dL Final     Albumin Level   Date Value Ref Range Status   01/16/2023 4.1 3.5 - 5.0 g/dL Final     Bilirubin Total   Date Value Ref Range Status   01/16/2023 1.5 <=1.5 mg/dL Final     Alkaline Phosphatase   Date Value Ref Range Status   01/16/2023 36 (L) 40 - 150 unit/L Final     Aspartate Aminotransferase   Date Value Ref Range Status   01/16/2023 24 5 - 34 unit/L Final     Alanine Aminotransferase   Date Value Ref Range Status   01/16/2023 32 0 - 55 unit/L Final     eGFR   Date Value Ref Range Status   01/16/2023 >60 mls/min/1.73/m2 Final

## 2023-02-15 ENCOUNTER — PATIENT MESSAGE (OUTPATIENT)
Dept: ADMINISTRATIVE | Facility: OTHER | Age: 23
End: 2023-02-15
Payer: MEDICAID

## 2023-02-15 NOTE — PROGRESS NOTES
PAT call complete.  Back pain --comes and goes no radiculopathy  Smoking 0.5 ppd x 5 years 2.5  ppy  Vaping  last use 5 months ago  ETOH  occasionally  Marijuana last use last week    AM DOS meds correct

## 2023-02-16 ENCOUNTER — ANESTHESIA (OUTPATIENT)
Dept: SURGERY | Facility: HOSPITAL | Age: 23
End: 2023-02-16
Payer: MEDICAID

## 2023-02-16 ENCOUNTER — HOSPITAL ENCOUNTER (OUTPATIENT)
Facility: HOSPITAL | Age: 23
Discharge: HOME | End: 2023-02-16
Attending: OBSTETRICS & GYNECOLOGY | Admitting: OBSTETRICS & GYNECOLOGY
Payer: MEDICAID

## 2023-02-16 DIAGNOSIS — R19.00 PELVIC MASS: ICD-10-CM

## 2023-02-16 DIAGNOSIS — N94.89 ADNEXAL MASS: Primary | ICD-10-CM

## 2023-02-16 LAB
B-HCG FREE SERPL-ACNC: <2.42 MIU/ML
GROUP & RH: NORMAL
INDIRECT COOMBS GEL: NORMAL

## 2023-02-16 PROCEDURE — 71000016 HC POSTOP RECOV ADDL HR: Performed by: OBSTETRICS & GYNECOLOGY

## 2023-02-16 PROCEDURE — 71000015 HC POSTOP RECOV 1ST HR: Performed by: OBSTETRICS & GYNECOLOGY

## 2023-02-16 PROCEDURE — 00840 ANES IPER PX LOWER ABD NOS: CPT | Performed by: OBSTETRICS & GYNECOLOGY

## 2023-02-16 PROCEDURE — 84702 CHORIONIC GONADOTROPIN TEST: CPT | Performed by: STUDENT IN AN ORGANIZED HEALTH CARE EDUCATION/TRAINING PROGRAM

## 2023-02-16 PROCEDURE — 71000033 HC RECOVERY, INTIAL HOUR: Performed by: OBSTETRICS & GYNECOLOGY

## 2023-02-16 PROCEDURE — 25000003 PHARM REV CODE 250: Performed by: OBSTETRICS & GYNECOLOGY

## 2023-02-16 PROCEDURE — 63600175 PHARM REV CODE 636 W HCPCS: Performed by: NURSE ANESTHETIST, CERTIFIED REGISTERED

## 2023-02-16 PROCEDURE — 86900 BLOOD TYPING SEROLOGIC ABO: CPT | Performed by: STUDENT IN AN ORGANIZED HEALTH CARE EDUCATION/TRAINING PROGRAM

## 2023-02-16 PROCEDURE — 25000003 PHARM REV CODE 250: Performed by: NURSE ANESTHETIST, CERTIFIED REGISTERED

## 2023-02-16 PROCEDURE — 36000708 HC OR TIME LEV III 1ST 15 MIN: Performed by: OBSTETRICS & GYNECOLOGY

## 2023-02-16 PROCEDURE — 25000003 PHARM REV CODE 250: Performed by: STUDENT IN AN ORGANIZED HEALTH CARE EDUCATION/TRAINING PROGRAM

## 2023-02-16 PROCEDURE — 25000003 PHARM REV CODE 250: Performed by: SPECIALIST

## 2023-02-16 PROCEDURE — 27201423 OPTIME MED/SURG SUP & DEVICES STERILE SUPPLY: Performed by: OBSTETRICS & GYNECOLOGY

## 2023-02-16 PROCEDURE — 63600175 PHARM REV CODE 636 W HCPCS: Performed by: SPECIALIST

## 2023-02-16 PROCEDURE — 37000008 HC ANESTHESIA 1ST 15 MINUTES: Performed by: OBSTETRICS & GYNECOLOGY

## 2023-02-16 PROCEDURE — 63600175 PHARM REV CODE 636 W HCPCS: Mod: JG | Performed by: OBSTETRICS & GYNECOLOGY

## 2023-02-16 PROCEDURE — 88305 TISSUE EXAM BY PATHOLOGIST: CPT | Performed by: OBSTETRICS & GYNECOLOGY

## 2023-02-16 PROCEDURE — 37000009 HC ANESTHESIA EA ADD 15 MINS: Performed by: OBSTETRICS & GYNECOLOGY

## 2023-02-16 PROCEDURE — 36000709 HC OR TIME LEV III EA ADD 15 MIN: Performed by: OBSTETRICS & GYNECOLOGY

## 2023-02-16 PROCEDURE — 63600175 PHARM REV CODE 636 W HCPCS

## 2023-02-16 RX ORDER — LIDOCAINE HYDROCHLORIDE 10 MG/ML
INJECTION INFILTRATION; PERINEURAL
Status: DISCONTINUED | OUTPATIENT
Start: 2023-02-16 | End: 2023-02-16 | Stop reason: HOSPADM

## 2023-02-16 RX ORDER — ACETAMINOPHEN 325 MG/1
650 TABLET ORAL EVERY 6 HOURS
Qty: 112 TABLET | Refills: 0 | Status: SHIPPED | OUTPATIENT
Start: 2023-02-16 | End: 2023-03-02

## 2023-02-16 RX ORDER — MEPERIDINE HYDROCHLORIDE 25 MG/ML
INJECTION INTRAMUSCULAR; INTRAVENOUS; SUBCUTANEOUS
Status: COMPLETED
Start: 2023-02-16 | End: 2023-02-16

## 2023-02-16 RX ORDER — METHYLENE BLUE 5 MG/ML
25 INJECTION INTRAVENOUS ONCE
Status: DISCONTINUED | OUTPATIENT
Start: 2023-02-16 | End: 2023-02-16 | Stop reason: HOSPADM

## 2023-02-16 RX ORDER — LIDOCAINE HYDROCHLORIDE 20 MG/ML
INJECTION INTRAVENOUS
Status: DISCONTINUED | OUTPATIENT
Start: 2023-02-16 | End: 2023-02-16

## 2023-02-16 RX ORDER — HYDROMORPHONE HYDROCHLORIDE 2 MG/ML
INJECTION, SOLUTION INTRAMUSCULAR; INTRAVENOUS; SUBCUTANEOUS
Status: DISCONTINUED
Start: 2023-02-16 | End: 2023-02-16 | Stop reason: WASHOUT

## 2023-02-16 RX ORDER — MIDAZOLAM HYDROCHLORIDE 1 MG/ML
2 INJECTION INTRAMUSCULAR; INTRAVENOUS ONCE
Status: COMPLETED | OUTPATIENT
Start: 2023-02-16 | End: 2023-02-16

## 2023-02-16 RX ORDER — OXYCODONE AND ACETAMINOPHEN 5; 325 MG/1; MG/1
2 TABLET ORAL ONCE
Status: COMPLETED | OUTPATIENT
Start: 2023-02-16 | End: 2023-02-16

## 2023-02-16 RX ORDER — IBUPROFEN 600 MG/1
600 TABLET ORAL 4 TIMES DAILY
Qty: 56 TABLET | Refills: 0 | Status: SHIPPED | OUTPATIENT
Start: 2023-02-16 | End: 2023-03-02

## 2023-02-16 RX ORDER — DEXAMETHASONE SODIUM PHOSPHATE 4 MG/ML
INJECTION, SOLUTION INTRA-ARTICULAR; INTRALESIONAL; INTRAMUSCULAR; INTRAVENOUS; SOFT TISSUE
Status: DISCONTINUED | OUTPATIENT
Start: 2023-02-16 | End: 2023-02-16

## 2023-02-16 RX ORDER — OXYCODONE AND ACETAMINOPHEN 5; 325 MG/1; MG/1
1 TABLET ORAL EVERY 4 HOURS PRN
Qty: 5 TABLET | Refills: 0 | Status: SHIPPED | OUTPATIENT
Start: 2023-02-16 | End: 2023-08-15

## 2023-02-16 RX ORDER — LIDOCAINE HYDROCHLORIDE 10 MG/ML
INJECTION, SOLUTION EPIDURAL; INFILTRATION; INTRACAUDAL; PERINEURAL
Status: DISCONTINUED
Start: 2023-02-16 | End: 2023-02-16 | Stop reason: HOSPADM

## 2023-02-16 RX ORDER — HYDROMORPHONE HYDROCHLORIDE 1 MG/ML
0.5 INJECTION, SOLUTION INTRAMUSCULAR; INTRAVENOUS; SUBCUTANEOUS EVERY 5 MIN PRN
Status: DISCONTINUED | OUTPATIENT
Start: 2023-02-16 | End: 2023-02-16 | Stop reason: HOSPADM

## 2023-02-16 RX ORDER — PROCHLORPERAZINE EDISYLATE 5 MG/ML
5 INJECTION INTRAMUSCULAR; INTRAVENOUS ONCE AS NEEDED
Status: DISCONTINUED | OUTPATIENT
Start: 2023-02-16 | End: 2023-02-16 | Stop reason: HOSPADM

## 2023-02-16 RX ORDER — MIDAZOLAM HYDROCHLORIDE 1 MG/ML
INJECTION INTRAMUSCULAR; INTRAVENOUS
Status: DISCONTINUED
Start: 2023-02-16 | End: 2023-02-16 | Stop reason: HOSPADM

## 2023-02-16 RX ORDER — HYDROMORPHONE HYDROCHLORIDE 1 MG/ML
INJECTION, SOLUTION INTRAMUSCULAR; INTRAVENOUS; SUBCUTANEOUS
Status: COMPLETED
Start: 2023-02-16 | End: 2023-02-16

## 2023-02-16 RX ORDER — KETOROLAC TROMETHAMINE 30 MG/ML
INJECTION, SOLUTION INTRAMUSCULAR; INTRAVENOUS
Status: DISCONTINUED | OUTPATIENT
Start: 2023-02-16 | End: 2023-02-16

## 2023-02-16 RX ORDER — HYDROMORPHONE HYDROCHLORIDE 1 MG/ML
0.2 INJECTION, SOLUTION INTRAMUSCULAR; INTRAVENOUS; SUBCUTANEOUS EVERY 5 MIN PRN
Status: DISCONTINUED | OUTPATIENT
Start: 2023-02-16 | End: 2023-02-16 | Stop reason: HOSPADM

## 2023-02-16 RX ORDER — LIDOCAINE HYDROCHLORIDE 10 MG/ML
1 INJECTION, SOLUTION EPIDURAL; INFILTRATION; INTRACAUDAL; PERINEURAL ONCE
Status: DISCONTINUED | OUTPATIENT
Start: 2023-02-16 | End: 2023-02-16 | Stop reason: HOSPADM

## 2023-02-16 RX ORDER — MUPIROCIN 20 MG/G
OINTMENT TOPICAL
Status: DISCONTINUED | OUTPATIENT
Start: 2023-02-16 | End: 2023-02-16 | Stop reason: HOSPADM

## 2023-02-16 RX ORDER — TRAMADOL HYDROCHLORIDE 50 MG/1
50 TABLET ORAL
Status: COMPLETED | OUTPATIENT
Start: 2023-02-16 | End: 2023-02-16

## 2023-02-16 RX ORDER — EPHEDRINE SULFATE 50 MG/ML
INJECTION, SOLUTION INTRAVENOUS
Status: DISCONTINUED | OUTPATIENT
Start: 2023-02-16 | End: 2023-02-16

## 2023-02-16 RX ORDER — FENTANYL CITRATE 50 UG/ML
INJECTION, SOLUTION INTRAMUSCULAR; INTRAVENOUS
Status: DISCONTINUED | OUTPATIENT
Start: 2023-02-16 | End: 2023-02-16

## 2023-02-16 RX ORDER — DOXYCYCLINE 100 MG/1
100 CAPSULE ORAL 2 TIMES DAILY
Qty: 10 CAPSULE | Refills: 0 | Status: SHIPPED | OUTPATIENT
Start: 2023-02-16 | End: 2023-11-30 | Stop reason: SDUPTHER

## 2023-02-16 RX ORDER — MEPERIDINE HYDROCHLORIDE 25 MG/ML
12.5 INJECTION INTRAMUSCULAR; INTRAVENOUS; SUBCUTANEOUS ONCE
Status: COMPLETED | OUTPATIENT
Start: 2023-02-16 | End: 2023-02-16

## 2023-02-16 RX ORDER — ACETAMINOPHEN 500 MG
1000 TABLET ORAL
Status: COMPLETED | OUTPATIENT
Start: 2023-02-16 | End: 2023-02-16

## 2023-02-16 RX ORDER — ROCURONIUM BROMIDE 10 MG/ML
INJECTION, SOLUTION INTRAVENOUS
Status: DISCONTINUED | OUTPATIENT
Start: 2023-02-16 | End: 2023-02-16

## 2023-02-16 RX ORDER — ONDANSETRON 2 MG/ML
4 INJECTION INTRAMUSCULAR; INTRAVENOUS ONCE
Status: DISCONTINUED | OUTPATIENT
Start: 2023-02-16 | End: 2023-02-16 | Stop reason: HOSPADM

## 2023-02-16 RX ORDER — PROMETHAZINE HYDROCHLORIDE 25 MG/ML
INJECTION, SOLUTION INTRAMUSCULAR; INTRAVENOUS
Status: COMPLETED
Start: 2023-02-16 | End: 2023-02-16

## 2023-02-16 RX ORDER — PROPOFOL 10 MG/ML
VIAL (ML) INTRAVENOUS
Status: DISCONTINUED | OUTPATIENT
Start: 2023-02-16 | End: 2023-02-16

## 2023-02-16 RX ORDER — METHYLENE BLUE 5 MG/ML
INJECTION INTRAVENOUS
Status: DISCONTINUED | OUTPATIENT
Start: 2023-02-16 | End: 2023-02-16 | Stop reason: HOSPADM

## 2023-02-16 RX ORDER — ONDANSETRON 2 MG/ML
INJECTION INTRAMUSCULAR; INTRAVENOUS
Status: DISCONTINUED | OUTPATIENT
Start: 2023-02-16 | End: 2023-02-16

## 2023-02-16 RX ORDER — GLYCOPYRROLATE 0.2 MG/ML
INJECTION INTRAMUSCULAR; INTRAVENOUS
Status: DISCONTINUED | OUTPATIENT
Start: 2023-02-16 | End: 2023-02-16

## 2023-02-16 RX ORDER — IPRATROPIUM BROMIDE AND ALBUTEROL SULFATE 2.5; .5 MG/3ML; MG/3ML
3 SOLUTION RESPIRATORY (INHALATION) ONCE AS NEEDED
Status: DISCONTINUED | OUTPATIENT
Start: 2023-02-16 | End: 2023-02-16 | Stop reason: HOSPADM

## 2023-02-16 RX ORDER — HYDROMORPHONE HYDROCHLORIDE 1 MG/ML
INJECTION, SOLUTION INTRAMUSCULAR; INTRAVENOUS; SUBCUTANEOUS
Status: DISCONTINUED
Start: 2023-02-16 | End: 2023-02-16 | Stop reason: HOSPADM

## 2023-02-16 RX ORDER — SODIUM CHLORIDE 9 MG/ML
INJECTION, SOLUTION INTRAVENOUS CONTINUOUS
Status: DISCONTINUED | OUTPATIENT
Start: 2023-02-16 | End: 2023-02-16 | Stop reason: HOSPADM

## 2023-02-16 RX ORDER — DIPHENHYDRAMINE HYDROCHLORIDE 50 MG/ML
25 INJECTION INTRAMUSCULAR; INTRAVENOUS ONCE AS NEEDED
Status: DISCONTINUED | OUTPATIENT
Start: 2023-02-16 | End: 2023-02-16 | Stop reason: HOSPADM

## 2023-02-16 RX ORDER — GABAPENTIN 300 MG/1
600 CAPSULE ORAL
Status: COMPLETED | OUTPATIENT
Start: 2023-02-16 | End: 2023-02-16

## 2023-02-16 RX ORDER — SODIUM CHLORIDE, SODIUM LACTATE, POTASSIUM CHLORIDE, CALCIUM CHLORIDE 600; 310; 30; 20 MG/100ML; MG/100ML; MG/100ML; MG/100ML
INJECTION, SOLUTION INTRAVENOUS CONTINUOUS
Status: DISCONTINUED | OUTPATIENT
Start: 2023-02-16 | End: 2023-02-16 | Stop reason: HOSPADM

## 2023-02-16 RX ORDER — METHYLENE BLUE 5 MG/ML
INJECTION INTRAVENOUS
Status: DISCONTINUED
Start: 2023-02-16 | End: 2023-02-16 | Stop reason: HOSPADM

## 2023-02-16 RX ADMIN — MEPERIDINE HYDROCHLORIDE 12.5 MG: 25 INJECTION INTRAMUSCULAR; INTRAVENOUS; SUBCUTANEOUS at 02:02

## 2023-02-16 RX ADMIN — FENTANYL CITRATE 25 MCG: 50 INJECTION, SOLUTION INTRAMUSCULAR; INTRAVENOUS at 01:02

## 2023-02-16 RX ADMIN — MIDAZOLAM HYDROCHLORIDE 2 MG: 1 INJECTION, SOLUTION INTRAMUSCULAR; INTRAVENOUS at 11:02

## 2023-02-16 RX ADMIN — GLYCOPYRROLATE 0.2 MG: 0.2 INJECTION INTRAMUSCULAR; INTRAVENOUS at 12:02

## 2023-02-16 RX ADMIN — DEXAMETHASONE SODIUM PHOSPHATE 8 MG: 4 INJECTION, SOLUTION INTRA-ARTICULAR; INTRALESIONAL; INTRAMUSCULAR; INTRAVENOUS; SOFT TISSUE at 12:02

## 2023-02-16 RX ADMIN — TRAMADOL HYDROCHLORIDE 50 MG: 50 TABLET, COATED ORAL at 09:02

## 2023-02-16 RX ADMIN — HYDROMORPHONE HYDROCHLORIDE 0.5 MG: 1 INJECTION, SOLUTION INTRAMUSCULAR; INTRAVENOUS; SUBCUTANEOUS at 02:02

## 2023-02-16 RX ADMIN — ACETAMINOPHEN 1000 MG: 500 TABLET ORAL at 09:02

## 2023-02-16 RX ADMIN — HYDROMORPHONE HYDROCHLORIDE 0.5 MG: 1 INJECTION, SOLUTION INTRAMUSCULAR; INTRAVENOUS; SUBCUTANEOUS at 01:02

## 2023-02-16 RX ADMIN — KETOROLAC TROMETHAMINE 30 MG: 30 INJECTION, SOLUTION INTRAMUSCULAR; INTRAVENOUS at 12:02

## 2023-02-16 RX ADMIN — ONDANSETRON 4 MG: 2 INJECTION INTRAMUSCULAR; INTRAVENOUS at 01:02

## 2023-02-16 RX ADMIN — FENTANYL CITRATE 75 MCG: 50 INJECTION, SOLUTION INTRAMUSCULAR; INTRAVENOUS at 12:02

## 2023-02-16 RX ADMIN — SUGAMMADEX 200 MG: 100 INJECTION, SOLUTION INTRAVENOUS at 01:02

## 2023-02-16 RX ADMIN — PROPOFOL 200 MG: 10 INJECTION, EMULSION INTRAVENOUS at 12:02

## 2023-02-16 RX ADMIN — LIDOCAINE HYDROCHLORIDE 50 MG: 20 INJECTION, SOLUTION INTRAVENOUS at 12:02

## 2023-02-16 RX ADMIN — ROCURONIUM BROMIDE 50 MG: 10 INJECTION, SOLUTION INTRAVENOUS at 12:02

## 2023-02-16 RX ADMIN — GABAPENTIN 600 MG: 300 CAPSULE ORAL at 09:02

## 2023-02-16 RX ADMIN — PROMETHAZINE HYDROCHLORIDE 12.5 MG: 25 INJECTION INTRAMUSCULAR; INTRAVENOUS at 02:02

## 2023-02-16 RX ADMIN — SODIUM CHLORIDE, POTASSIUM CHLORIDE, SODIUM LACTATE AND CALCIUM CHLORIDE: 600; 310; 30; 20 INJECTION, SOLUTION INTRAVENOUS at 11:02

## 2023-02-16 RX ADMIN — DOXYCYCLINE 100 MG: 100 INJECTION, POWDER, LYOPHILIZED, FOR SOLUTION INTRAVENOUS at 12:02

## 2023-02-16 RX ADMIN — OXYCODONE AND ACETAMINOPHEN 1 TABLET: 325; 5 TABLET ORAL at 02:02

## 2023-02-16 RX ADMIN — EPHEDRINE SULFATE 15 MG: 50 INJECTION INTRAVENOUS at 12:02

## 2023-02-16 NOTE — TRANSFER OF CARE
Anesthesia Transfer of Care Note    Patient: April Tagert    Procedure(s) Performed: Procedure(s) (LRB):  LAPAROSCOPY, DIAGNOSTIC (N/A)  EXCISION, MASS, PELVIS    Patient location: OPS    Anesthesia Type: MAC    Transport from OR: Transported from OR on room air with adequate spontaneous ventilation    Post pain: adequate analgesia    Post assessment: no apparent anesthetic complications    Post vital signs: stable    Level of consciousness: awake    Nausea/Vomiting: no nausea/vomiting          Last vitals:   Visit Vitals  /75   Pulse 68   Temp 36.3 °C (97.3 °F) (Temporal)   Resp 20   Wt 107.4 kg (236 lb 12.4 oz)   SpO2 100%   Breastfeeding No   BMI 37.08 kg/m²

## 2023-02-16 NOTE — OP NOTE
[facility] - [facility]  Surgery Department  Operative Note    SUMMARY     Date of Procedure: 2/16/2023     Procedure:   Procedure(s) (LRB):  LAPAROSCOPIC EXCISION OF RIGHT PARATUBAL CYST  CHROMOPERTUBATION    Surgeon(s) and Role:     * Arline Valente MD - Primary     * Natasha Hurtado MD - Resident - Assisting     * Ino Waite MD - Resident - Chief  *Shakira Cárdenas MD - Resident    Pre-Operative Diagnosis:   Adnexal mass  History of chlamydia infection    Post-Operative Diagnosis:   Right paratubal cyst    Anesthesia: General    Operative Findings:   EUA: Normal external genitalia with multiple subcentimeter pustules in various stages of healing in groin bilaterally consistent with folliculitis. Uterus 8week size, mobile. Fullness in right adnexa. Vaginal vault without lesion, discharge, or bleeding. Cervix visualized without lesion or erythema.   Laparoscopically: Liver, gallbladder, and stomach edge visualized as normal without lesion or adhesion. Appendix visualized and normal without erythema or edema. 6 cm thin walled paratubal cyst at distal end of right fallopian tube below fimbriae. Normal appearing left fallopian tube and bilateral ovaries. Anterior and posterior cul de sac without lesion. Uterus 8wk size, mobile. Spillage of methylene blue dye noted from bilateral fallopian tubes.    Description of Technical Procedures:   Indication and consent:   Risks/benefits/alternatives of the procedure were reviewed with the patient including but not limited to visceral and vascular injury, infection, blood loss, need for transfusion, prolonged hosiptilaztion, reoperation, need to convert to open approach if not able to be performed safely through the laparoscope. Questions answered and consents reviewed.     Procedure:  Patient was pre-medicated per ERAS protocol. The patient was taken to the OR with IVF running. Doxycycline 100mg IV was administered for infection prophylaxis given history of  chlamydia and planned chromopertubation. SCDs were placed to bilateral lower extremities for DVT prophylaxis. She was placed in dorsal supine position. General endotracheal anesthesia was then administered without incident. The patient was positioned in dorsal lithotomy position using Noe stirrups, with careful attention to leg positioning. Both arms were tucked in  position with liberal padding of the elbows and hands. A Bovie pad was placed on the right side. A timeout was performed.  A pelvic exam was performed and noted the above findings. The patient was then prepped and draped in the usual sterile fashion.     Attention was paid to the vagina. A speculum was placed in the vagina. The cervix was identified and grasped with a single tooth tenaculum. The uterus was sound to 7 cm. The HUMI manipulator was assembled and inserted into the uterus. The intrauterine balloon was inflated with 5cc of saline.       Attention was then paid to the abdomen. A 5-mm incision was made with a scalpel 8cm lateral to the umbilicus on the left side of the patient. With direct visualization, a 5-mm trocar introduced into the abdomen. Upon confirmation of entry into the abdomen, low-flow CO2 gas was initiated with confirmation of low pressure and thus high-flow was initiated to a level of 15 mmHg. No visceral or vascular injury occurred with entry into abdomen.     A survey of the upper abdomen was performed with the above-noted findings and the patient was then placed in steep Trendelenburg revealing the above-noted findings. Two additional additional 5mm trocars were then placed under direct visualization, 1 in right mid abdomen and 1 in the RLQ. A 10mm trocar was placed in the inferior portion of the umbilicus.     Attention was turned to the right adnexa and the thin walled paratubal cyst. The fimbriated end of the right fallopian tube was grasped and elevated. The underlying mesosalpinx of the right fallopian tube was  grasped and sequentially ligated and transected until the paratubal cyst was freed. Excellent hemostasis was noted. A 10mm endocatch bag was inserted through the 10mm trocar and the cyst was placed into the bag. The bag was brought up the incision and a scalpel was used to rupture the cyst. The contents were aspirated and the bag containing the remainder of the cyst was removed. The specimen was sent to pathology.     Attention was returned to the pelvis. Methylene blue dye was injected through the HUMI manipulator, and spillage of dye was noted from bilateral fallopian tubes. Photos were taken. Again excellent hemostasis was noted.     The remaining trocars were deflated and removed under direct visualization. The 10mm skin incision was probed and the fascial defect underneath was noted to be approximately 5mm, deemed to not require closure. The skin incisions were closed using 4-0 monocryl. The incisions were then injected with 10cc of lidocaine. The skin incisions covered with Dermabond. A vaginal sweep was performed and revealed no retained instruments.     All instrument, sponge counts were correct times two. The patient tolerated the procedure well. She was extubated and transferred to recovery in stable condition.      Dr. Valente was present and scrubbed for the entirety of the procedure.    Estimated Blood Loss (EBL): 20 mL    Urine Output: 200 mL clear yellow urine via red rubber catheter     IVF: 500mL crystalloid           Implants: None    Specimens:   Specimen (24h ago, onward)       Start     Ordered    02/16/23 1314  Specimen to Pathology  RELEASE UPON ORDERING        References:    Click here for ordering Quick Tip   Question:  Release to patient  Answer:  Immediate    02/16/23 1314                            Condition: Stable    Disposition: PACU - hemodynamically stable.    Natasha Hurtado MD  [facility] PGY3

## 2023-02-16 NOTE — ANESTHESIA PROCEDURE NOTES
Intubation    Date/Time: 2/16/2023 12:21 PM  Performed by: Rosendo Montez CRNA  Authorized by: Nolvia Martin MD     Intubation:     Induction:  Intravenous    Mask Ventilation:  Easy mask    Attempts:  1    Attempted By:  Student    Method of Intubation:  Direct    Blade:  Ada 3    Laryngeal View Grade: Grade I - full view of cords      Difficult Airway Encountered?: No      Complications:  None    Airway Device:  Oral endotracheal tube    Airway Device Size:  7.5    Style/Cuff Inflation:  Cuffed (inflated to minimal occlusive pressure)    Inflation Amount (mL):  6    Tube secured:  22    Secured at:  The lips    Placement Verified By:  Capnometry    Complicating Factors:  None    Findings Post-Intubation:  BS equal bilateral and atraumatic/condition of teeth unchanged

## 2023-02-16 NOTE — INTERVAL H&P NOTE
21 yo F with pelvic mass    The patient has been examined and the H&P has been reviewed:    I concur with the findings and no changes have occurred since H&P was written.    Surgery risks, benefits and alternative options discussed and understood by patient/family.    To OR for diagnostic laparoscopy, possible salpingectomy, chromopertubation, possible ovarian cystectomy, possible oophorectomy and excision of pelvic mass      There are no hospital problems to display for this patient.

## 2023-02-16 NOTE — BRIEF OP NOTE
[facility] - Periop Services  Brief Operative Note    Surgery Date: 2/16/2023     Surgeon(s) and Role:     * Arline Valente MD - Primary     * Natasha Hurtado MD - Resident - Assisting     * Ino Waite MD - Resident - Chief  *Shakira Cárdenas MD - Resident    Pre-op Diagnosis:   Adnexal mass  History of chlamydia infection    Post-op Diagnosis:    Right paratubal cyst    Procedure(s) (LRB):  LAPAROSCOPIC EXCISION OF RIGHT PARATUBAL CYST  CHROMOPERTUBATION    Anesthesia: General    Operative Findings: EUA: Normal external genitalia with multiple subcentimeter pustules in various stages of healing in groin bilaterally consistent with folliculitis. Uterus 8week size, mobile. Fullness in right adnexa. Vaginal vault without lesion, discharge, or bleeding. Cervix visualized without lesion or erythema.   Laparoscopically: Liver, gallbladder, and stomach edge visualized as normal without lesion or adhesion. Appendix visualized and normal without erythema or edema. 6 cm thin walled paratubal cyst at distal end of right fallopian tube below fimbriae. Normal appearing left fallopian tube and bilateral ovaries. Anterior and posterior cul de sac without lesion. Uterus 8wk size, mobile. Spillage of methylene blue dye noted from bilateral fallopian tubes.    Estimated Blood Loss: 20 mL    Urine Output: 200 mL clear yellow urine via red rubber catheter    IVF: 500mL crystalloid         Specimens:   Specimen (24h ago, onward)       Start     Ordered    02/16/23 1314  Specimen to Pathology  RELEASE UPON ORDERING        References:    Click here for ordering Quick Tip   Question:  Release to patient  Answer:  Immediate    02/16/23 1314                      Discharge Note    OUTCOME: Patient tolerated treatment/procedure well without complication and is now ready for discharge.    DISPOSITION: Home or Self Care    FINAL DIAGNOSIS:  Adnexal mass    FOLLOWUP: In clinic    DISCHARGE INSTRUCTIONS:    Discharge Procedure Orders    Diet Adult Regular     Pelvic Rest     Notify your health care provider if you experience any of the following:  temperature >100.4     Notify your health care provider if you experience any of the following:  persistent nausea and vomiting or diarrhea     Notify your health care provider if you experience any of the following:  severe uncontrolled pain     Notify your health care provider if you experience any of the following:  redness, tenderness, or signs of infection (pain, swelling, redness, odor or green/yellow discharge around incision site)     Notify your health care provider if you experience any of the following:  difficulty breathing or increased cough     Notify your health care provider if you experience any of the following:  worsening rash     No dressing needed     Activity as tolerated   Order Comments: Nothing in the vagina for 2 weeks  No tub baths for 2 weeks  No lifting heavier than 10 lb for 2 weeks  No driving for 2 weeks or while taking narcotics     Natasha Hurtado MD  [employer] [facility] PGY3

## 2023-02-16 NOTE — ANESTHESIA POSTPROCEDURE EVALUATION
Anesthesia Post Evaluation    Patient: April Tagert    Procedure(s) Performed: Procedure(s) (LRB):  LAPAROSCOPY, DIAGNOSTIC (N/A)  EXCISION, MASS, PELVIS          Patient location during evaluation: OPS  Patient participation: Yes- Able to Participate  Level of consciousness: awake and alert  Post-procedure vital signs: reviewed and stable  Pain management: adequate  Airway patency: patent    PONV status at discharge: No PONV                Vitals Value Taken Time   /75 02/16/23 1118   Temp 36.3 °C (97.3 °F) 02/16/23 1118   Pulse 68 02/16/23 1118   Resp 20 02/16/23 1118   SpO2 100 % 02/16/23 1118         No case tracking events are documented in the log.      Pain/Josh Score: Pain Rating Prior to Med Admin: 0 (2/16/2023  9:19 AM)

## 2023-02-16 NOTE — TRANSFER OF CARE
Anesthesia Transfer of Care Note    Patient: April Tagert    Procedure(s) Performed: Procedure(s) (LRB):  LAPAROSCOPY, DIAGNOSTIC (N/A)  EXCISION, MASS, PELVIS    Patient location: PACU    Anesthesia Type: general and regional    Transport from OR: Transported from OR on room air with adequate spontaneous ventilation    Post pain: adequate analgesia    Post assessment: no apparent anesthetic complications and tolerated procedure well    Post vital signs: stable    Level of consciousness: sedated    Nausea/Vomiting: no nausea/vomiting    Complications: none    Transfer of care protocol was followed      Last vitals:   Visit Vitals  /75   Pulse 68   Temp 36.3 °C (97.3 °F) (Temporal)   Resp 20   Wt 107.4 kg (236 lb 12.4 oz)   SpO2 100%   Breastfeeding No   BMI 37.08 kg/m²

## 2023-02-16 NOTE — DISCHARGE INSTRUCTIONS
LAP HYSTERECTOMY WITH TYLENOL/IBUPROFEN WITH OXYCODONE FOR BREAKTHROUGH SEVERE    · Keep follow up appointment on March 10th at 9:30am in [facility] on the 7th Floor.      ` Resume home medications unless otherwise instructed by your doctor.    · Pelvic rest for __2____ weeks (no baths, soaking, tampons, nothing in vagina, no douches or intercourse).    · No heavy lifting/straining for 3 days. Moving around carefully is recommended.    · You may shower _starting tomorrow_. Do not soak incisions. Do not peel or scrub skin glue/ bandage strips (steristrips) or apply ointments/creams/lotions- it will fall off over the next 2 weeks when ready. Sutures inside will dissolve over one month.    · Take pain medication as prescribed. Alternate Tylenol with Ibuprofen and SUBSTITUTE Percocet for Tylenol as needed for severe pain, as prescribed only.    · If you are experiencing severe pain even with your pain medications, please call the Women[facility]s clinic at [phone] to notify your doctor.    · Take over the counter laxatives such as Miralax for constipation. Do not strain to have a bowel movement.    · Brace belly with pillow when coughing/sneezing/deep breathing.    · Apply ice pack to area as needed for pain.    · No driving or consuming alcohol for the next 24 hours or while taking narcotic pain medicine.    · Some bleeding/spotting is to be expected for several days.    · Notify MD of bleeding more than 1 pad per hour, any moderate to severe pain unrelieved by pain medicine or for any signs of infection including fever above 100.4, yellow/green foul-smelling drainage, nausea or vomiting. Clinics number: [phone], or after business hours or emergency call [phone].    · Thanks for choosing [facility]! Have a smooth recovery!

## 2023-02-22 VITALS
WEIGHT: 236.75 LBS | HEART RATE: 64 BPM | BODY MASS INDEX: 37.08 KG/M2 | SYSTOLIC BLOOD PRESSURE: 116 MMHG | TEMPERATURE: 98 F | RESPIRATION RATE: 20 BRPM | DIASTOLIC BLOOD PRESSURE: 62 MMHG | OXYGEN SATURATION: 99 %

## 2023-02-22 PROBLEM — N94.89 ADNEXAL MASS: Status: ACTIVE | Noted: 2023-02-22

## 2023-02-22 LAB
ESTROGEN SERPL-MCNC: NORMAL PG/ML
INSULIN SERPL-ACNC: NORMAL U[IU]/ML
LAB AP CLINICAL INFORMATION: NORMAL
LAB AP GROSS DESCRIPTION: NORMAL
LAB AP REPORT FOOTNOTES: NORMAL
T3RU NFR SERPL: NORMAL %

## 2023-02-23 NOTE — ANESTHESIA POSTPROCEDURE EVALUATION
Anesthesia Post Evaluation    Patient: April Tagert    Procedure(s) Performed: Procedure(s) (LRB):  LAPAROSCOPY, DIAGNOSTIC (N/A)  EXCISION, MASS, PELVIS    Final Anesthesia Type: general      Patient location during evaluation: PACU  Patient participation: Yes- Able to Participate  Level of consciousness: awake and responds to stimulation  Post-procedure vital signs: reviewed and stable  Pain management: adequate  Airway patency: patent    PONV status at discharge: No PONV  Anesthetic complications: no      Cardiovascular status: blood pressure returned to baseline  Respiratory status: unassisted  Hydration status: euvolemic  Follow-up not needed.          Vitals Value Taken Time   /62 02/16/23 1525   Temp 36.4 °C (97.5 °F) 02/16/23 1525   Pulse 64 02/16/23 1525   Resp 20 02/16/23 1525   SpO2 99 % 02/16/23 1525         Event Time   Out of Recovery 14:28:00         Pain/Josh Score: No data recorded

## 2023-03-10 ENCOUNTER — OFFICE VISIT (OUTPATIENT)
Dept: GYNECOLOGY | Facility: CLINIC | Age: 23
End: 2023-03-10
Payer: MEDICAID

## 2023-03-10 VITALS
BODY MASS INDEX: 37.51 KG/M2 | RESPIRATION RATE: 20 BRPM | HEART RATE: 61 BPM | OXYGEN SATURATION: 99 % | HEIGHT: 67 IN | DIASTOLIC BLOOD PRESSURE: 74 MMHG | SYSTOLIC BLOOD PRESSURE: 109 MMHG | WEIGHT: 239 LBS | TEMPERATURE: 98 F

## 2023-03-10 DIAGNOSIS — N94.9 ADNEXAL CYST: Primary | ICD-10-CM

## 2023-03-10 PROCEDURE — 99213 OFFICE O/P EST LOW 20 MIN: CPT | Mod: PBBFAC

## 2023-03-10 RX ORDER — IBUPROFEN 600 MG/1
600 TABLET ORAL ONCE
COMMUNITY
End: 2023-08-15

## 2023-03-10 RX ORDER — ACETAMINOPHEN 325 MG/1
325 TABLET ORAL ONCE
COMMUNITY
End: 2023-08-15

## 2023-03-10 NOTE — PROGRESS NOTES
Pt reports her incision has been bothering her ''a little bit''. Reports having sharp pain when she touches it. She gives it a 6 out of 10. Ended her cycle 3/9/23. Pt reports spotting only when wiping.

## 2023-03-10 NOTE — PROGRESS NOTES
"Subjective:      Postoperative Follow-up     April Luis is a 22 y.o.  who presents to the clinic 3 weeks status post  laparoscopic excision of R paratubal cyst and chromopertubation  for  R sided adnexal mass . Eating a regular diet with difficulty. Bowel movements are  every other day, which is normal for patient . Pain is controlled without any medications.    Patient's medications, allergies, past medical, surgical, social and family histories were reviewed and updated as appropriate.      Review of Systems  Pertinent items are noted in HPI.     PATHOLOGY: paratubal cyst, no evidence of malignancy     Objective:      /74 (BP Location: Right arm, Patient Position: Sitting, BP Method: Large (Automatic))   Pulse 61   Temp 98.3 °F (36.8 °C) (Oral)   Resp 20   Ht 5' 7" (1.702 m)   Wt 108.4 kg (239 lb)   LMP 2023 (Exact Date)   SpO2 99%   BMI 37.43 kg/m²   General:  alert,appears stated age,cooperative   Abdomen: soft,bowel sounds active,non-tender   Incision:   healing well,no drainage,no erythema,no hernia,no seroma,no swelling,no dehiscence,incision well approximated       Assessment:       22 y.o.  s/p laparoscopic excision of R paratubal cyst and chromopertubation for R sided adnexal mass doing well post-operatively.  Operative findings again reviewed. Pathology report discussed.  1. Adnexal cyst                Plan:        1. Continue any current medications.  2. Wound care discussed.  3. Activity restrictions: none  4. Anticipated return to work: now.  5. Follow up: 1  year  for wellness visit with ISABELLE Paz M.D.  [facility] PGY-2      Problem List Items Addressed This Visit    None  Visit Diagnoses       Adnexal cyst    -  Primary            "

## 2023-04-26 ENCOUNTER — HOSPITAL ENCOUNTER (OUTPATIENT)
Dept: RADIOLOGY | Facility: HOSPITAL | Age: 23
Discharge: HOME | End: 2023-04-26
Payer: MEDICAID

## 2023-04-26 ENCOUNTER — OFFICE VISIT (OUTPATIENT)
Dept: FAMILY MEDICINE | Facility: CLINIC | Age: 23
End: 2023-04-26
Payer: MEDICAID

## 2023-04-26 VITALS
RESPIRATION RATE: 18 BRPM | SYSTOLIC BLOOD PRESSURE: 119 MMHG | HEART RATE: 68 BPM | DIASTOLIC BLOOD PRESSURE: 78 MMHG | WEIGHT: 234.13 LBS | BODY MASS INDEX: 36.75 KG/M2 | TEMPERATURE: 98 F | OXYGEN SATURATION: 100 % | HEIGHT: 67 IN

## 2023-04-26 DIAGNOSIS — Z72.0 TOBACCO ABUSE: ICD-10-CM

## 2023-04-26 DIAGNOSIS — R22.42 NODULE OF SKIN OF LEFT FOOT: Primary | ICD-10-CM

## 2023-04-26 DIAGNOSIS — L24.9 IRRITANT CONTACT DERMATITIS, UNSPECIFIED TRIGGER: ICD-10-CM

## 2023-04-26 DIAGNOSIS — L73.2 HIDRADENITIS SUPPURATIVA: ICD-10-CM

## 2023-04-26 PROCEDURE — 3008F BODY MASS INDEX DOCD: CPT | Mod: CPTII,,,

## 2023-04-26 PROCEDURE — 73630 X-RAY EXAM OF FOOT: CPT | Mod: TC,PN,LT

## 2023-04-26 PROCEDURE — 99406 PR TOBACCO USE CESSATION INTERMEDIATE 3-10 MINUTES: ICD-10-PCS | Mod: S$PBB,,,

## 2023-04-26 PROCEDURE — 99214 PR OFFICE/OUTPT VISIT, EST, LEVL IV, 30-39 MIN: ICD-10-PCS | Mod: S$PBB,25,,

## 2023-04-26 PROCEDURE — 3008F PR BODY MASS INDEX (BMI) DOCUMENTED: ICD-10-PCS | Mod: CPTII,,,

## 2023-04-26 PROCEDURE — 1159F MED LIST DOCD IN RCRD: CPT | Mod: CPTII,,,

## 2023-04-26 PROCEDURE — 99215 OFFICE O/P EST HI 40 MIN: CPT | Mod: PBBFAC,PN

## 2023-04-26 PROCEDURE — 3078F PR MOST RECENT DIASTOLIC BLOOD PRESSURE < 80 MM HG: ICD-10-PCS | Mod: CPTII,,,

## 2023-04-26 PROCEDURE — 3078F DIAST BP <80 MM HG: CPT | Mod: CPTII,,,

## 2023-04-26 PROCEDURE — 1160F RVW MEDS BY RX/DR IN RCRD: CPT | Mod: CPTII,,,

## 2023-04-26 PROCEDURE — 99214 OFFICE O/P EST MOD 30 MIN: CPT | Mod: S$PBB,25,,

## 2023-04-26 PROCEDURE — 3074F PR MOST RECENT SYSTOLIC BLOOD PRESSURE < 130 MM HG: ICD-10-PCS | Mod: CPTII,,,

## 2023-04-26 PROCEDURE — 1159F PR MEDICATION LIST DOCUMENTED IN MEDICAL RECORD: ICD-10-PCS | Mod: CPTII,,,

## 2023-04-26 PROCEDURE — 3074F SYST BP LT 130 MM HG: CPT | Mod: CPTII,,,

## 2023-04-26 PROCEDURE — 1160F PR REVIEW ALL MEDS BY PRESCRIBER/CLIN PHARMACIST DOCUMENTED: ICD-10-PCS | Mod: CPTII,,,

## 2023-04-26 PROCEDURE — 99406 BEHAV CHNG SMOKING 3-10 MIN: CPT | Mod: S$PBB,,,

## 2023-04-26 RX ORDER — TRIAMCINOLONE ACETONIDE 1 MG/G
CREAM TOPICAL 2 TIMES DAILY
Qty: 45 G | Refills: 5 | Status: SHIPPED | OUTPATIENT
Start: 2023-04-26 | End: 2023-08-15

## 2023-04-26 RX ORDER — CLINDAMYCIN PHOSPHATE 10 MG/G
GEL TOPICAL 2 TIMES DAILY
Qty: 60 G | Refills: 6 | Status: SHIPPED | OUTPATIENT
Start: 2023-04-26 | End: 2023-08-15

## 2023-04-26 RX ORDER — DOXYCYCLINE 100 MG/1
100 CAPSULE ORAL DAILY
Qty: 90 CAPSULE | Refills: 1 | Status: SHIPPED | OUTPATIENT
Start: 2023-04-26 | End: 2023-10-23

## 2023-04-26 NOTE — ASSESSMENT & PLAN NOTE
Hidradenitis suppurativa  - Patient may continue clindamycin gel with any flares that occur.  - Patient understands to resume bleach baths PRN as needed  - Advise to compliance with prophylaxis treatment doxycycline 100mg capsule once daily with supper and increase with 100mg twice daily when she has flare-up.

## 2023-04-26 NOTE — PROGRESS NOTES
Patient Name: Aleksandra Smith   : 2000  MRN: [id number]     Subjective:   Patient ID: Aleksandra Smith is a 23 y.o. female.    Chief Complaint:   Chief Complaint   Patient presents with    Follow-up     C/O hand rash, left foot nodule         HPI: 2023: Patient presents for evaluation of a rash involving the hand, groin and axilla. Rash started years ago with intermittent flares. Lesions are in different stages of healing, and flat in texture. Rash has changed over time. Rash is painful. Associated symptoms: none. Patient denies: abdominal pain, arthralgia, congestion, cough, decrease in appetite, decrease in energy level, fever, headache, and irritability. Patient has not had contacts with similar rash. Patient has not had new exposures (soaps, lotions, laundry detergents, foods, medications, plants, insects or animals). She additionally has a fluctuant, non tender, mobile module to the dorsal left foot. States it has been there as long as she can remember and a friend told her that it was not normal so she wanted to have it checked out.     10/26/2022:  Rechecking she titers today she had completed all of her medications without any issues.  Routine lab work reviewed with patient with no questions or concerns.  Patient is additionally complaining of vaginal irritation she states that she knows is that to alleges that she is often requesting the cream.  Denies odor, discharge changes in cycles are exposure to STDs.  Patient also states that he was in a only occurs occasionally and is very tolerable.  2022:  Recently tested positive for syphilis; treated with doxy due to pen allergy. Otherwise she has no medical concerns. She has occasional constipation for which she is easily relieved by OTC meds. Seasonal allergies, takes OTC medications with relief.          ROS:  Review of Systems   Constitutional:  Negative for chills, fever and weight loss.   HENT:  Negative for ear discharge, nosebleeds and tinnitus.     Eyes:  Negative for blurred vision, photophobia and pain.   Respiratory:  Negative for cough, shortness of breath, wheezing and stridor.    Cardiovascular:  Negative for chest pain, palpitations and orthopnea.   Gastrointestinal:  Negative for abdominal pain, heartburn and nausea.   Genitourinary:  Negative for dysuria, frequency, hematuria and urgency.   Musculoskeletal:  Negative for falls and myalgias.   Skin:  Positive for rash. Negative for itching.   Neurological:  Negative for dizziness, sensory change, speech change, focal weakness, seizures, weakness and headaches.   Endo/Heme/Allergies:  Negative for environmental allergies. Does not bruise/bleed easily.   Psychiatric/Behavioral:  Negative for hallucinations and suicidal ideas.     History:   History reviewed. No pertinent past medical history.   Past Surgical History:   Procedure Laterality Date    DIAGNOSTIC LAPAROSCOPY N/A 2023    Procedure: LAPAROSCOPY, DIAGNOSTIC;  Surgeon: Arline Valente MD;  Location: [facility];  Service: OB/GYN;  Laterality: N/A;  10 trocar, 10mm lap bag, humi, enseal, methylene blue, doxycycline    EXCISION OF PELVIC MASS  2023    Procedure: EXCISION, MASS, PELVIS;  Surgeon: Arline Valente MD;  Location: [facility];  Service: OB/GYN;;     Family History   Problem Relation Age of Onset    Diabetes Maternal Grandmother     Diabetes Maternal Grandfather     Hypertension Mother       Social History     Tobacco Use    Smoking status: Former     Types: Vaping with nicotine     Quit date:      Years since quittin.3    Smokeless tobacco: Current   Substance and Sexual Activity    Alcohol use: Yes     Comment: occasionally    Drug use: Yes     Types: Marijuana     Comment: occasionally    Sexual activity: Yes     Partners: Male     Birth control/protection: None        Allergies:   Review of patient's allergies indicates:   Allergen Reactions    Penicillins      Unknown: Was told by mother that she was allergic  "    Objective:     Vitals:    04/26/23 0848   BP: 119/78   Pulse: 68   Resp: 18   Temp: 98.2 °F (36.8 °C)   SpO2: 100%   Weight: 106.2 kg (234 lb 1.6 oz)   Height: 5' 7" (1.702 m)     Body mass index is 36.67 kg/m².     Physical Examination:   Physical Exam  Vitals reviewed.   Constitutional:       Appearance: Normal appearance. She is normal weight.   HENT:      Head: Normocephalic.      Right Ear: Tympanic membrane, ear canal and external ear normal.      Left Ear: Tympanic membrane, ear canal and external ear normal.      Nose: Nose normal.      Mouth/Throat:      Mouth: Mucous membranes are moist.      Pharynx: Oropharynx is clear.   Eyes:      Extraocular Movements: Extraocular movements intact.      Conjunctiva/sclera: Conjunctivae normal.      Pupils: Pupils are equal, round, and reactive to light.   Cardiovascular:      Rate and Rhythm: Normal rate and regular rhythm.      Pulses: Normal pulses.      Heart sounds: Normal heart sounds.   Pulmonary:      Effort: Pulmonary effort is normal.      Breath sounds: Normal breath sounds.   Abdominal:      General: Abdomen is flat. Bowel sounds are normal.      Palpations: Abdomen is soft.   Musculoskeletal:         General: Normal range of motion.      Cervical back: Normal range of motion and neck supple.   Skin:     General: Skin is warm and dry.      Findings: Rash present.      Comments: Bilat groin, axilla scaring    Neurological:      General: No focal deficit present.      Mental Status: She is alert and oriented to person, place, and time.   Psychiatric:         Mood and Affect: Mood normal.         Behavior: Behavior normal.       Assessment:     Problem List Items Addressed This Visit          Derm    Irritant contact dermatitis    Relevant Medications    triamcinolone acetonide 0.1% (KENALOG) 0.1 % cream    Hidradenitis suppurativa    Current Assessment & Plan     Hidradenitis suppurativa  - Patient may continue clindamycin gel with any flares that " occur.  - Patient understands to resume bleach baths PRN as needed  - Advise to compliance with prophylaxis treatment doxycycline 100mg capsule once daily with supper and increase with 100mg twice daily when she has flare-up.             Relevant Medications    doxycycline (VIBRAMYCIN) 100 MG Cap    clindamycin phosphate 1% (CLINDAGEL) 1 % gel    Other Relevant Orders    Ambulatory referral/consult to Dermatology       Other    Nodule of skin of left foot - Primary    Relevant Orders    X-Ray Foot Complete Left (Completed)    US Soft Tissue, Lower Extremity, Left    Tobacco abuse    Current Assessment & Plan     Encouraged smoking cessation.  Smoking cessation discussed for 5 minutes.  Discussed benefits of quitting including improved health, decreased cardiac/vascular/pulmonary/stroke risks as well as saving money                Plan:   April was seen today for follow-up.    Diagnoses and all orders for this visit:    Nodule of skin of left foot  -     X-Ray Foot Complete Left  -     US Soft Tissue, Lower Extremity, Left; Future    Irritant contact dermatitis, unspecified trigger  -     triamcinolone acetonide 0.1% (KENALOG) 0.1 % cream; Apply topically 2 (two) times daily.    Hidradenitis suppurativa  -     doxycycline (VIBRAMYCIN) 100 MG Cap; Take 1 capsule (100 mg total) by mouth once daily.  -     clindamycin phosphate 1% (CLINDAGEL) 1 % gel; Apply topically 2 (two) times daily.  -     Ambulatory referral/consult to Dermatology; Future    Tobacco abuse       Follow up in about 6 weeks (around 6/7/2023) for Virtual Visit, med change fu, rash and nodule follow up.     This note was created with the assistance of Dragon voice recognition software or phone dictation. There may be transcription errors as a result of using this technology however minimal. Effort has been made to assure accuracy of transcription but any obvious errors or omissions should be clarified with the author of the document

## 2023-05-02 ENCOUNTER — TELEPHONE (OUTPATIENT)
Dept: FAMILY MEDICINE | Facility: CLINIC | Age: 23
End: 2023-05-02

## 2023-05-31 ENCOUNTER — HOSPITAL ENCOUNTER (OUTPATIENT)
Dept: RADIOLOGY | Facility: HOSPITAL | Age: 23
Discharge: HOME | End: 2023-05-31
Payer: MEDICAID

## 2023-05-31 DIAGNOSIS — R22.42 NODULE OF SKIN OF LEFT FOOT: ICD-10-CM

## 2023-05-31 PROCEDURE — 76882 US LMTD JT/FCL EVL NVASC XTR: CPT | Mod: TC,LT

## 2023-06-08 ENCOUNTER — OFFICE VISIT (OUTPATIENT)
Dept: FAMILY MEDICINE | Facility: CLINIC | Age: 23
End: 2023-06-08
Payer: MEDICAID

## 2023-06-08 DIAGNOSIS — M25.572 PAIN OF JOINT OF LEFT ANKLE AND FOOT: Primary | ICD-10-CM

## 2023-06-08 DIAGNOSIS — Z86.19 HISTORY OF SYPHILIS: ICD-10-CM

## 2023-06-08 DIAGNOSIS — R22.42 NODULE OF SKIN OF LEFT FOOT: ICD-10-CM

## 2023-06-08 DIAGNOSIS — M25.579 PAIN IN JOINT INVOLVING ANKLE AND FOOT, UNSPECIFIED LATERALITY: ICD-10-CM

## 2023-06-08 PROCEDURE — 99213 OFFICE O/P EST LOW 20 MIN: CPT | Mod: 95,,,

## 2023-06-08 PROCEDURE — 99213 PR OFFICE/OUTPT VISIT, EST, LEVL III, 20-29 MIN: ICD-10-PCS | Mod: 95,,,

## 2023-06-08 PROCEDURE — 1160F PR REVIEW ALL MEDS BY PRESCRIBER/CLIN PHARMACIST DOCUMENTED: ICD-10-PCS | Mod: CPTII,95,,

## 2023-06-08 PROCEDURE — 1159F MED LIST DOCD IN RCRD: CPT | Mod: CPTII,95,,

## 2023-06-08 PROCEDURE — 1159F PR MEDICATION LIST DOCUMENTED IN MEDICAL RECORD: ICD-10-PCS | Mod: CPTII,95,,

## 2023-06-08 PROCEDURE — 1160F RVW MEDS BY RX/DR IN RCRD: CPT | Mod: CPTII,95,,

## 2023-06-08 NOTE — PROGRESS NOTES
Audio Only Telehealth Visit     The patient location is: [place]  The chief complaint leading to consultation is: ankle pain, review US  Visit type: Virtual visit with audio only (telephone)  Total time spent with patient: 25 min     The reason for the audio only service rather than synchronous audio and video virtual visit was related to technical difficulties or patient preference/necessity.     Each patient to whom I provide medical services by telemedicine is:  (1) informed of the relationship between the physician and patient and the respective role of any other health care provider with respect to management of the patient; and (2) notified that they may decline to receive medical services by telemedicine and may withdraw from such care at any time. Patient verbally consented to receive this service via voice-only telephone call.    Patient Name: Aleksandra Smith   : 2000  MRN: [id number]     Subjective:   Patient ID: Aleksandra Smith is a 23 y.o. female.    Chief Complaint: ankle pain, review US     HPI: 2023: paining following up HS, states she is having marked improvement. She also has had US to assess previously mentions nodule on her left foot. US showed Irregular fluid at the area of concern, may be secondary to localized soft tissue edema versus small tarsometatarsal joint effusion or sequela of synovitis.  This is otherwise nonspecific and of limited characterization by sonography. No definite evidence of expansile mass-like lesion or drainable collection.     2023: Patient presents for evaluation of a rash involving the hand, groin and axilla. Rash started years ago with intermittent flares. Lesions are in different stages of healing, and flat in texture. Rash has changed over time. Rash is painful. Associated symptoms: none. Patient denies: abdominal pain, arthralgia, congestion, cough, decrease in appetite, decrease in energy level, fever, headache, and irritability. Patient has not had  contacts with similar rash. Patient has not had new exposures (soaps, lotions, laundry detergents, foods, medications, plants, insects or animals). She additionally has a fluctuant, non tender, mobile module to the dorsal left foot. States it has been there as long as she can remember and a friend told her that it was not normal so she wanted to have it checked out.     10/26/2022:  Rechecking she titers today she had completed all of her medications without any issues.  Routine lab work reviewed with patient with no questions or concerns.  Patient is additionally complaining of vaginal irritation she states that she knows is that to alleges that she is often requesting the cream.  Denies odor, discharge changes in cycles are exposure to STDs.  Patient also states that he was in a only occurs occasionally and is very tolerable.  7/11/2022:  Recently tested positive for syphilis; treated with doxy due to pen allergy. Otherwise she has no medical concerns. She has occasional constipation for which she is easily relieved by OTC meds. Seasonal allergies, takes OTC medications with relief.          ROS:  Review of Systems   Constitutional:  Negative for chills, fever and weight loss.   HENT:  Negative for ear discharge, nosebleeds and tinnitus.    Eyes:  Negative for blurred vision, photophobia and pain.   Respiratory:  Negative for cough, shortness of breath, wheezing and stridor.    Cardiovascular:  Negative for chest pain, palpitations and orthopnea.   Gastrointestinal:  Negative for abdominal pain, heartburn and nausea.   Genitourinary:  Negative for dysuria, frequency, hematuria and urgency.   Musculoskeletal:  Negative for falls and myalgias.   Skin:  Negative for itching and rash.        nodule   Neurological:  Negative for dizziness, sensory change, speech change, focal weakness, seizures, weakness and headaches.   Endo/Heme/Allergies:  Negative for environmental allergies. Does not bruise/bleed easily.    Psychiatric/Behavioral:  Negative for hallucinations and suicidal ideas.     History:   History reviewed. No pertinent past medical history.   Past Surgical History:   Procedure Laterality Date    DIAGNOSTIC LAPAROSCOPY N/A 2023    Procedure: LAPAROSCOPY, DIAGNOSTIC;  Surgeon: Arline Valente MD;  Location: [facility];  Service: OB/GYN;  Laterality: N/A;  10 trocar, 10mm lap bag, humi, enseal, methylene blue, doxycycline    EXCISION OF PELVIC MASS  2023    Procedure: EXCISION, MASS, PELVIS;  Surgeon: Arline Valente MD;  Location: [facility];  Service: OB/GYN;;     Family History   Problem Relation Age of Onset    Diabetes Maternal Grandmother     Diabetes Maternal Grandfather     Hypertension Mother       Social History     Tobacco Use    Smoking status: Former     Types: Vaping with nicotine     Quit date:      Years since quittin.4    Smokeless tobacco: Current   Substance and Sexual Activity    Alcohol use: Yes     Comment: occasionally    Drug use: Yes     Types: Marijuana     Comment: occasionally    Sexual activity: Yes     Partners: Male     Birth control/protection: None        Allergies:   Review of patient's allergies indicates:   Allergen Reactions    Penicillins      Unknown: Was told by mother that she was allergic     Objective:   There were no vitals filed for this visit.  There is no height or weight on file to calculate BMI.     Physical Examination:   Physical Exam  Constitutional:       General: She is not in acute distress.     Comments: Limited Physical Exam due to telemedicine visit   Neurological:      Mental Status: She is alert.   Psychiatric:         Mood and Affect: Mood normal.         Behavior: Behavior normal.       Assessment:     Problem List Items Addressed This Visit          ID    History of syphilis    Current Assessment & Plan     reassess at next office visit if she does not get RPR prior         Relevant Orders    SYPHILIS ANTIBODY (WITH REFLEX RPR)       Other     Nodule of skin of left foot    Overview     US 4/2023: 1. Irregular fluid at the area of concern, may be secondary to localized soft tissue edema versus small tarsometatarsal joint effusion or sequela of synovitis.  This is otherwise nonspecific and of limited characterization by sonography.  2. No definite evidence of expansile mass-like lesion or drainable collection.  3. More detailed characterization with contrast enhanced MR of the foot could be obtained, if there is ongoing clinical concern.         Current Assessment & Plan     MRI ordered today         Relevant Orders    Ambulatory referral/consult to Orthopedics     Other Visit Diagnoses       Pain of joint of left ankle and foot    -  Primary    Relevant Orders    Ambulatory referral/consult to Orthopedics    Pain in joint involving ankle and foot, unspecified laterality        Relevant Orders    MRI Ankle Without Contrast Left            Plan:   Diagnoses and all orders for this visit:    Pain of joint of left ankle and foot  -     Ambulatory referral/consult to Orthopedics; Future    Nodule of skin of left foot  -     Ambulatory referral/consult to Orthopedics; Future    Pain in joint involving ankle and foot, unspecified laterality  -     MRI Ankle Without Contrast Left; Future    History of syphilis  -     SYPHILIS ANTIBODY (WITH REFLEX RPR); Future       Follow up in about 4 weeks (around 7/6/2023) for 30 min slot for skin tag removal and ankle follow up.       This note was created with the assistance of a voice recognition software or phone dictation. There may be transcription errors as a result of using this technology however minimal. Effort has been made to assure accuracy of transcription but any obvious errors or omissions should be clarified with the author of the document      This service was not originating from a related E/M service provided within the previous 7 days nor will  to an E/M service or procedure within the next 24  hours or my soonest available appointment.  Prevailing standard of care was able to be met in this audio-only visit.

## 2023-08-15 ENCOUNTER — OFFICE VISIT (OUTPATIENT)
Dept: FAMILY MEDICINE | Facility: CLINIC | Age: 23
End: 2023-08-15
Payer: MEDICAID

## 2023-08-15 VITALS
HEIGHT: 67 IN | OXYGEN SATURATION: 98 % | TEMPERATURE: 98 F | HEART RATE: 67 BPM | DIASTOLIC BLOOD PRESSURE: 81 MMHG | RESPIRATION RATE: 20 BRPM | BODY MASS INDEX: 36.47 KG/M2 | WEIGHT: 232.38 LBS | SYSTOLIC BLOOD PRESSURE: 125 MMHG

## 2023-08-15 DIAGNOSIS — L91.8 SKIN TAG: Primary | ICD-10-CM

## 2023-08-15 PROCEDURE — 3074F SYST BP LT 130 MM HG: CPT | Mod: CPTII,,,

## 2023-08-15 PROCEDURE — 3079F PR MOST RECENT DIASTOLIC BLOOD PRESSURE 80-89 MM HG: ICD-10-PCS | Mod: CPTII,,,

## 2023-08-15 PROCEDURE — 1160F PR REVIEW ALL MEDS BY PRESCRIBER/CLIN PHARMACIST DOCUMENTED: ICD-10-PCS | Mod: CPTII,,,

## 2023-08-15 PROCEDURE — 1159F PR MEDICATION LIST DOCUMENTED IN MEDICAL RECORD: ICD-10-PCS | Mod: CPTII,,,

## 2023-08-15 PROCEDURE — 3008F PR BODY MASS INDEX (BMI) DOCUMENTED: ICD-10-PCS | Mod: CPTII,,,

## 2023-08-15 PROCEDURE — 11200 RMVL SKIN TAGS UP TO&INC 15: CPT | Mod: PBBFAC,PN

## 2023-08-15 PROCEDURE — 11200 PR REMOVAL OF SKIN TAGS, UP TO 15: ICD-10-PCS | Mod: S$PBB,,,

## 2023-08-15 PROCEDURE — 1160F RVW MEDS BY RX/DR IN RCRD: CPT | Mod: CPTII,,,

## 2023-08-15 PROCEDURE — 3079F DIAST BP 80-89 MM HG: CPT | Mod: CPTII,,,

## 2023-08-15 PROCEDURE — 3074F PR MOST RECENT SYSTOLIC BLOOD PRESSURE < 130 MM HG: ICD-10-PCS | Mod: CPTII,,,

## 2023-08-15 PROCEDURE — 1159F MED LIST DOCD IN RCRD: CPT | Mod: CPTII,,,

## 2023-08-15 PROCEDURE — 11200 RMVL SKIN TAGS UP TO&INC 15: CPT | Mod: S$PBB,,,

## 2023-08-15 PROCEDURE — 99213 OFFICE O/P EST LOW 20 MIN: CPT | Mod: PBBFAC,PN

## 2023-08-15 PROCEDURE — 3008F BODY MASS INDEX DOCD: CPT | Mod: CPTII,,,

## 2023-08-15 RX ORDER — MUPIROCIN 20 MG/G
OINTMENT TOPICAL 3 TIMES DAILY
Qty: 30 G | Refills: 1 | Status: SHIPPED | OUTPATIENT
Start: 2023-08-15 | End: 2024-03-28

## 2023-08-15 NOTE — PROGRESS NOTES
Skin Tag Removal Procedure Note    Pre-operative Diagnosis: Classic skin tags (acrochordon)    Post-operative Diagnosis: Classic skin tags (acrochordon)    Locations:posterior  neck    Indications: irritation, bleeding    Anesthesia:  topical      Procedure Details   The risks (including bleeding and infection) and benefits of the procedure and Written informed consent obtained. Using sterile iris scissors, multiple skin tags were snipped off at their bases after cleansing with Betadine.  Bleeding was controlled by pressure.     Findings:  14 Pathognomonic benign lesions  not sent for pathological exam.    Condition:  Stable    Complications:  none.    Plan:  1. Instructed to keep the wounds dry and covered for 24-48h and clean thereafter.  2. Warning signs of infection were reviewed.    3. Recommended that the patient use OTC acetaminophen as needed for pain.   4. Return as needed.      I spent a total of 30 minutes on the day of the visit.This includes face to face time and non-face to face time preparing to see the patient (eg, review of tests), obtaining and/or reviewing separately obtained history, documenting clinical information in the electronic or other health record, independently interpreting results and communicating results to the patient/family/caregiver, or care coordinator.

## 2023-11-30 ENCOUNTER — OFFICE VISIT (OUTPATIENT)
Dept: FAMILY MEDICINE | Facility: CLINIC | Age: 23
End: 2023-11-30
Payer: MEDICAID

## 2023-11-30 VITALS
SYSTOLIC BLOOD PRESSURE: 119 MMHG | WEIGHT: 234.38 LBS | DIASTOLIC BLOOD PRESSURE: 83 MMHG | TEMPERATURE: 98 F | OXYGEN SATURATION: 99 % | BODY MASS INDEX: 36.79 KG/M2 | HEART RATE: 72 BPM | RESPIRATION RATE: 20 BRPM | HEIGHT: 67 IN

## 2023-11-30 DIAGNOSIS — Z72.0 TOBACCO ABUSE: ICD-10-CM

## 2023-11-30 DIAGNOSIS — L73.2 HIDRADENITIS SUPPURATIVA: ICD-10-CM

## 2023-11-30 DIAGNOSIS — F33.0 MILD EPISODE OF RECURRENT MAJOR DEPRESSIVE DISORDER: Primary | ICD-10-CM

## 2023-11-30 PROCEDURE — 1159F MED LIST DOCD IN RCRD: CPT | Mod: CPTII,,,

## 2023-11-30 PROCEDURE — 3079F PR MOST RECENT DIASTOLIC BLOOD PRESSURE 80-89 MM HG: ICD-10-PCS | Mod: CPTII,,,

## 2023-11-30 PROCEDURE — 99406 BEHAV CHNG SMOKING 3-10 MIN: CPT | Mod: S$PBB,,,

## 2023-11-30 PROCEDURE — 3079F DIAST BP 80-89 MM HG: CPT | Mod: CPTII,,,

## 2023-11-30 PROCEDURE — 99214 OFFICE O/P EST MOD 30 MIN: CPT | Mod: PBBFAC,PN

## 2023-11-30 PROCEDURE — 99406 PR TOBACCO USE CESSATION INTERMEDIATE 3-10 MINUTES: ICD-10-PCS | Mod: S$PBB,,,

## 2023-11-30 PROCEDURE — 1160F RVW MEDS BY RX/DR IN RCRD: CPT | Mod: CPTII,,,

## 2023-11-30 PROCEDURE — 99214 OFFICE O/P EST MOD 30 MIN: CPT | Mod: S$PBB,25,,

## 2023-11-30 PROCEDURE — 1159F PR MEDICATION LIST DOCUMENTED IN MEDICAL RECORD: ICD-10-PCS | Mod: CPTII,,,

## 2023-11-30 PROCEDURE — 3074F PR MOST RECENT SYSTOLIC BLOOD PRESSURE < 130 MM HG: ICD-10-PCS | Mod: CPTII,,,

## 2023-11-30 PROCEDURE — 3008F BODY MASS INDEX DOCD: CPT | Mod: CPTII,,,

## 2023-11-30 PROCEDURE — 3008F PR BODY MASS INDEX (BMI) DOCUMENTED: ICD-10-PCS | Mod: CPTII,,,

## 2023-11-30 PROCEDURE — 99214 PR OFFICE/OUTPT VISIT, EST, LEVL IV, 30-39 MIN: ICD-10-PCS | Mod: S$PBB,25,,

## 2023-11-30 PROCEDURE — 1160F PR REVIEW ALL MEDS BY PRESCRIBER/CLIN PHARMACIST DOCUMENTED: ICD-10-PCS | Mod: CPTII,,,

## 2023-11-30 PROCEDURE — 3074F SYST BP LT 130 MM HG: CPT | Mod: CPTII,,,

## 2023-11-30 RX ORDER — FLUOXETINE HYDROCHLORIDE 20 MG/1
20 CAPSULE ORAL DAILY
Qty: 90 CAPSULE | Refills: 0 | Status: SHIPPED | OUTPATIENT
Start: 2023-11-30 | End: 2023-12-20 | Stop reason: SDUPTHER

## 2023-11-30 RX ORDER — ONDANSETRON 4 MG/1
4 TABLET, ORALLY DISINTEGRATING ORAL
Qty: 10 TABLET | Refills: 0 | Status: SHIPPED | OUTPATIENT
Start: 2023-11-30 | End: 2023-12-30

## 2023-11-30 RX ORDER — DOXYCYCLINE 100 MG/1
100 CAPSULE ORAL DAILY
Qty: 90 CAPSULE | Refills: 1 | Status: SHIPPED | OUTPATIENT
Start: 2023-11-30 | End: 2024-03-28

## 2023-11-30 NOTE — ASSESSMENT & PLAN NOTE
Hidradenitis suppurativa  - Patient may continue clindamycin gel with any flares that occur.  - Patient understands to resume bleach baths PRN as needed  - Advise to compliance with prophylaxis treatment doxycycline 100mg capsule once daily with supper and increase with 100mg twice daily when he has flare-up.      States she has noticed a drastic improvement since starting doxycycline.  Requesting refill today.

## 2023-11-30 NOTE — ASSESSMENT & PLAN NOTE
New chronic issue, start patient on Prozac 20 mg daily.  Handout provided to patient to establish with a psychotherapist.  Patient would also appreciate a referral to psych nurse practitioner.    Read positive daily meditations, avoid negative media, set healthy boundaries.  Exercise daily, keep consistent sleep pattern, eat a healthy diet.  Establish good social support, make changes to reduce stress.  Reports any symptoms of suicidal/homicidal ideations or self harm immediately, if clinic is closed go to nearest emergency room.    Discussed use of SSRIs may activate thoughts of suicide.   If patient has any thoughts of harm to self or others stop medication and call clinic or go to ER.   Go to ER for symptoms of confusion, agitation, disorientation, restlessness, fever, tremors, while taking SSRIs as this may be signs of serotonin syndrome.

## 2023-11-30 NOTE — PROGRESS NOTES
Patient Name: Aleksandra Smith     : 2000    MRN: [id number]     Subjective:     Patient ID: Aleksandra Smith is a 23 y.o. female.    Chief Complaint:   Chief Complaint   Patient presents with    Follow-up     3 month f/u. Requesting to be referred for therapy. Increased anxiety and depression.         HPI: 2023: Patient complains of depression. She complains of depressed mood, fatigue, and hypersomnia. Onset was approximately several months ago but have been exacerbated by a recent break-up, states that she is ending a 2 year relationship that was unhealthy but she knows that needs to happen. Symptoms have been gradually worsening since that time. Current symptoms include: depressed mood, difficulty concentrating, fatigue, hopelessness, and hypersomnia. Patient denies impaired memory, recurrent thoughts of death, suicidal attempt, suicidal thoughts with specific plan, weight gain, and weight loss.  Possible organic causes contributing are: none. Risk factors: previous episode of depression, patient also states that she struggled with depression in her youth.  Endorses cutting, denies any previous suicide attempts. Previous treatment includes none, states she has never seen a psychiatrist or talk to anyone about this. Patient denies chest pain, palpitations, and shortness of breath.  Patient denies fever, night sweats, chills, nausea, vomiting, diarrhea, constipation, weight loss, and changes in appetite.        ROS:       12 point review of systems conducted, negative except as stated in the history of present illness. See HPI for details.    History:     History reviewed. No pertinent past medical history.     Past Surgical History:   Procedure Laterality Date    DIAGNOSTIC LAPAROSCOPY N/A 2023    Procedure: LAPAROSCOPY, DIAGNOSTIC;  Surgeon: Arline Valente MD;  Location: [facility];  Service: OB/GYN;  Laterality: N/A;  10 trocar, 10mm lap bag, humi, enseal, methylene blue, doxycycline    EXCISION OF PELVIC  "MASS  2023    Procedure: EXCISION, MASS, PELVIS;  Surgeon: Arline Valente MD;  Location: [facility];  Service: OB/GYN;;       Family History   Problem Relation Age of Onset    Diabetes Maternal Grandmother     Diabetes Maternal Grandfather     Hypertension Mother         Social History     Tobacco Use    Smoking status: Every Day     Types: Vaping with nicotine     Last attempt to quit:      Years since quittin.9    Smokeless tobacco: Never   Substance and Sexual Activity    Alcohol use: Yes     Comment: occasionally    Drug use: Yes     Types: Marijuana     Comment: occasionally    Sexual activity: Yes     Partners: Male     Birth control/protection: None       Current Outpatient Medications   Medication Instructions    doxycycline (MONODOX) 100 mg, Oral, Daily    FLUoxetine 20 mg, Oral, Daily    mupirocin (BACTROBAN) 2 % ointment Topical (Top), 3 times daily    ondansetron (ZOFRAN-ODT) 4 mg, Oral, Every 24 hours as needed        Review of patient's allergies indicates:   Allergen Reactions    Penicillins      Unknown: Was told by mother that she was allergic       Objective:     Visit Vitals  /83 (BP Location: Left arm, Patient Position: Sitting)   Pulse 72   Temp 98.2 °F (36.8 °C) (Oral)   Resp 20   Ht 5' 7" (1.702 m)   Wt 106.3 kg (234 lb 6.4 oz)   LMP 2023   SpO2 99%   BMI 36.71 kg/m²       Physical Examination:     Physical Exam  Constitutional:       General: She is not in acute distress.     Appearance: Normal appearance. She is not ill-appearing.   Cardiovascular:      Rate and Rhythm: Normal rate and regular rhythm.      Heart sounds: Normal heart sounds.   Pulmonary:      Effort: Pulmonary effort is normal. No respiratory distress.      Breath sounds: Normal breath sounds.   Musculoskeletal:      Cervical back: Normal range of motion.   Skin:     General: Skin is warm and dry.   Neurological:      Mental Status: She is alert and oriented to person, place, and time.   Psychiatric:   "       Mood and Affect: Mood normal.         Behavior: Behavior normal.         Lab Results:     Chemistry:  Lab Results   Component Value Date     01/16/2023    K 3.5 01/16/2023    CHLORIDE 107 01/16/2023    BUN 8.2 01/16/2023    CREATININE 0.74 01/16/2023    EGFRNORACEVR >60 01/16/2023    GLUCOSE 83 01/16/2023    CALCIUM 9.1 01/16/2023    ALKPHOS 36 (L) 01/16/2023    LABPROT 7.3 01/16/2023    ALBUMIN 4.1 01/16/2023    BILIDIR 0.5 12/02/2021    IBILI 1.10 (H) 12/02/2021    AST 24 01/16/2023    ALT 32 01/16/2023    TSH 2.0514 07/11/2022    ZXVWMV5ZBPL 0.82 07/11/2022        Lab Results   Component Value Date    HGBA1C 4.6 07/11/2022        Hematology:  Lab Results   Component Value Date    WBC 9.3 01/16/2023    HGB 12.9 01/16/2023    HCT 36.9 (L) 01/16/2023     01/16/2023       Lipid Panel:  Lab Results   Component Value Date    CHOL 130 07/11/2022    HDL 32 (L) 07/11/2022    LDL 80.00 07/11/2022    TRIG 92 07/11/2022    TOTALCHOLEST 4 07/11/2022        Urine:  Lab Results   Component Value Date    COLORUA Colorless (A) 01/16/2023    APPEARANCEUA Clear 01/16/2023    SGUA 1.008 01/16/2023    PHUA 6.0 01/16/2023    PROTEINUA Negative 01/16/2023    GLUCOSEUA Normal 01/16/2023    KETONESUA Negative 01/16/2023    BLOODUA 3+ (A) 01/16/2023    NITRITESUA Negative 01/16/2023    LEUKOCYTESUR Negative 01/16/2023    RBCUA 0-5 01/16/2023    WBCUA 0-5 01/16/2023    BACTERIA Trace (A) 01/16/2023    SQEPUA Trace (A) 01/16/2023    HYALINECASTS None Seen 01/16/2023        Assessment:          ICD-10-CM ICD-9-CM   1. Mild episode of recurrent major depressive disorder  F33.0 296.31   2. Hidradenitis suppurativa  L73.2 705.83   3. Tobacco abuse  Z72.0 305.1        Plan:     1. Mild episode of recurrent major depressive disorder  Assessment & Plan:  New chronic issue, start patient on Prozac 20 mg daily.  Handout provided to patient to establish with a psychotherapist.  Patient would also appreciate a referral to psych  nurse practitioner.    Read positive daily meditations, avoid negative media, set healthy boundaries.  Exercise daily, keep consistent sleep pattern, eat a healthy diet.  Establish good social support, make changes to reduce stress.  Reports any symptoms of suicidal/homicidal ideations or self harm immediately, if clinic is closed go to nearest emergency room.    Discussed use of SSRIs may activate thoughts of suicide.   If patient has any thoughts of harm to self or others stop medication and call clinic or go to ER.   Go to ER for symptoms of confusion, agitation, disorientation, restlessness, fever, tremors, while taking SSRIs as this may be signs of serotonin syndrome.       Orders:  -     Ambulatory referral/consult to Behavioral Health; Future; Expected date: 11/30/2023  -     FLUoxetine 20 MG capsule; Take 1 capsule (20 mg total) by mouth once daily.  Dispense: 90 capsule; Refill: 0    2. Hidradenitis suppurativa  Assessment & Plan:  Hidradenitis suppurativa  - Patient may continue clindamycin gel with any flares that occur.  - Patient understands to resume bleach baths PRN as needed  - Advise to compliance with prophylaxis treatment doxycycline 100mg capsule once daily with supper and increase with 100mg twice daily when he has flare-up.      States she has noticed a drastic improvement since starting doxycycline.  Requesting refill today.    Orders:  -     doxycycline (MONODOX) 100 MG capsule; Take 1 capsule (100 mg total) by mouth once daily.  Dispense: 90 capsule; Refill: 1    3. Tobacco abuse  Assessment & Plan:  Encouraged smoking cessation.  Smoking cessation discussed for 5 minutes.  Discussed benefits of quitting including improved health, decreased cardiac/vascular/pulmonary/stroke risks as well as saving money        Other orders  -     ondansetron (ZOFRAN-ODT) 4 MG TbDL; Take 1 tablet (4 mg total) by mouth every 24 hours as needed (nausea).  Dispense: 10 tablet; Refill: 0         Follow up in  about 4 weeks (around 12/28/2023) for Follow-up on initiation of Prozac.    Future Appointments   Date Time Provider Department Center   12/20/2023  9:15 AM Lidia Cui NP [facility]   2/15/2024  8:00 AM Yadira Phillips APRN [facility]   5/2/2024  8:10 AM Delores Reynoso FNP [facility] [place]    5/10/2024  9:00 AM Enrico Poole MD [facility]         Lidia Cui NP

## 2023-12-20 ENCOUNTER — OFFICE VISIT (OUTPATIENT)
Dept: FAMILY MEDICINE | Facility: CLINIC | Age: 23
End: 2023-12-20
Payer: MEDICAID

## 2023-12-20 VITALS
HEIGHT: 67 IN | HEART RATE: 60 BPM | DIASTOLIC BLOOD PRESSURE: 85 MMHG | RESPIRATION RATE: 18 BRPM | BODY MASS INDEX: 36.36 KG/M2 | SYSTOLIC BLOOD PRESSURE: 121 MMHG | OXYGEN SATURATION: 99 % | WEIGHT: 231.69 LBS | TEMPERATURE: 98 F

## 2023-12-20 DIAGNOSIS — F33.0 MILD EPISODE OF RECURRENT MAJOR DEPRESSIVE DISORDER: ICD-10-CM

## 2023-12-20 DIAGNOSIS — R21 RASH: Primary | ICD-10-CM

## 2023-12-20 PROCEDURE — 99214 OFFICE O/P EST MOD 30 MIN: CPT | Mod: S$PBB,,,

## 2023-12-20 PROCEDURE — 3008F PR BODY MASS INDEX (BMI) DOCUMENTED: ICD-10-PCS | Mod: CPTII,,,

## 2023-12-20 PROCEDURE — 1159F MED LIST DOCD IN RCRD: CPT | Mod: CPTII,,,

## 2023-12-20 PROCEDURE — 3079F PR MOST RECENT DIASTOLIC BLOOD PRESSURE 80-89 MM HG: ICD-10-PCS | Mod: CPTII,,,

## 2023-12-20 PROCEDURE — 3008F BODY MASS INDEX DOCD: CPT | Mod: CPTII,,,

## 2023-12-20 PROCEDURE — 99213 OFFICE O/P EST LOW 20 MIN: CPT | Mod: PBBFAC,PN

## 2023-12-20 PROCEDURE — 3074F PR MOST RECENT SYSTOLIC BLOOD PRESSURE < 130 MM HG: ICD-10-PCS | Mod: CPTII,,,

## 2023-12-20 PROCEDURE — 3079F DIAST BP 80-89 MM HG: CPT | Mod: CPTII,,,

## 2023-12-20 PROCEDURE — 99214 PR OFFICE/OUTPT VISIT, EST, LEVL IV, 30-39 MIN: ICD-10-PCS | Mod: S$PBB,,,

## 2023-12-20 PROCEDURE — 1159F PR MEDICATION LIST DOCUMENTED IN MEDICAL RECORD: ICD-10-PCS | Mod: CPTII,,,

## 2023-12-20 PROCEDURE — 1160F RVW MEDS BY RX/DR IN RCRD: CPT | Mod: CPTII,,,

## 2023-12-20 PROCEDURE — 1160F PR REVIEW ALL MEDS BY PRESCRIBER/CLIN PHARMACIST DOCUMENTED: ICD-10-PCS | Mod: CPTII,,,

## 2023-12-20 PROCEDURE — 3074F SYST BP LT 130 MM HG: CPT | Mod: CPTII,,,

## 2023-12-20 RX ORDER — TRIAMCINOLONE ACETONIDE 1 MG/G
CREAM TOPICAL 2 TIMES DAILY
Qty: 45 G | Refills: 2 | Status: SHIPPED | OUTPATIENT
Start: 2023-12-20

## 2023-12-20 RX ORDER — FLUOXETINE HYDROCHLORIDE 40 MG/1
40 CAPSULE ORAL DAILY
Qty: 90 CAPSULE | Refills: 0 | Status: SHIPPED | OUTPATIENT
Start: 2023-12-20 | End: 2024-02-01 | Stop reason: SDUPTHER

## 2023-12-20 NOTE — ASSESSMENT & PLAN NOTE
Increase patient's Prozac to 40 mg daily.  Keep appointment to establish with behavioral health in February.      Discussed use of SSRIs may activate thoughts of suicide.   If patient has any thoughts of harm to self or others stop medication and call clinic or go to ER.   Go to ER for symptoms of confusion, agitation, disorientation, restlessness, fever, tremors, while taking SSRIs as this may be signs of serotonin syndrome.

## 2023-12-20 NOTE — ASSESSMENT & PLAN NOTE
Instructed to use: All Free & Clear detergent; Dove Sensitive Skin (white bar), Ceravae skin cream; do not use lotions.  Do not use scented lotions, soaps, bubble bath/bath bombs/body wash.  Do not scratch.  Avoid wool clothing, avoid hot showers, use warm, tepid water.

## 2023-12-20 NOTE — PROGRESS NOTES
Patient Name: Aleksandra Smith     : 2000    MRN: [id number]     Subjective:     Patient ID: Aleksandra Smith is a 23 y.o. female.    Chief Complaint:   Chief Complaint   Patient presents with    Follow-up     F/u appointment. C/o nausea. States hasn't been eating a lot. States symptoms haven't worsened. States was told needs referral for counseling. C/o dry, red, cracked hands.         HPI: 2023:  Patient presents to clinic today for routine follow-up since initiation of SSRI.  Patient states that she is feeling about the same maybe a little bit better.  Denies any SI/HI, teofilo hallucinations.  Does endorse a little bit of nausea when starting the medication that has slowly been resolving.  Patient is curious if she can increase dosage of medication.  Additionally she is complaining of dry cracking hands, believes that she is using allergy friendly gloves at work, washes her hands frequently as well. Patient denies chest pain, palpitations, and shortness of breath.  Patient denies fever, night sweats, chills, nausea, vomiting, diarrhea, constipation, weight loss, and changes in appetite.        ROS:       12 point review of systems conducted, negative except as stated in the history of present illness. See HPI for details.    History:     History reviewed. No pertinent past medical history.     Past Surgical History:   Procedure Laterality Date    DIAGNOSTIC LAPAROSCOPY N/A 2023    Procedure: LAPAROSCOPY, DIAGNOSTIC;  Surgeon: Arline Valente MD;  Location: [facility];  Service: OB/GYN;  Laterality: N/A;  10 trocar, 10mm lap bag, humi, enseal, methylene blue, doxycycline    EXCISION OF PELVIC MASS  2023    Procedure: EXCISION, MASS, PELVIS;  Surgeon: Arline Valente MD;  Location: [facility];  Service: OB/GYN;;       Family History   Problem Relation Age of Onset    Diabetes Maternal Grandmother     Diabetes Maternal Grandfather     Hypertension Mother         Social History     Tobacco Use    Smoking  "status: Every Day     Types: Vaping with nicotine     Last attempt to quit:      Years since quittin.9    Smokeless tobacco: Never   Substance and Sexual Activity    Alcohol use: Yes     Comment: occasionally    Drug use: Yes     Types: Marijuana     Comment: occasionally    Sexual activity: Yes     Partners: Male     Birth control/protection: None       Current Outpatient Medications   Medication Instructions    doxycycline (MONODOX) 100 mg, Oral, Daily    FLUoxetine 40 mg, Oral, Daily    mupirocin (BACTROBAN) 2 % ointment Topical (Top), 3 times daily    ondansetron (ZOFRAN-ODT) 4 mg, Oral, Every 24 hours as needed    triamcinolone acetonide 0.1% (KENALOG) 0.1 % cream Topical (Top), 2 times daily        Review of patient's allergies indicates:   Allergen Reactions    Penicillins      Unknown: Was told by mother that she was allergic       Objective:     Visit Vitals  /85 (BP Location: Right arm, Patient Position: Sitting)   Pulse 60   Temp 98.2 °F (36.8 °C) (Oral)   Resp 18   Ht 5' 7" (1.702 m)   Wt 105.1 kg (231 lb 11.2 oz)   LMP 2023   SpO2 99%   BMI 36.29 kg/m²       Physical Examination:     Physical Exam  Constitutional:       General: She is not in acute distress.     Appearance: Normal appearance. She is not ill-appearing.   Cardiovascular:      Rate and Rhythm: Normal rate and regular rhythm.      Heart sounds: Normal heart sounds.   Pulmonary:      Effort: Pulmonary effort is normal. No respiratory distress.      Breath sounds: Normal breath sounds.   Musculoskeletal:      Cervical back: Normal range of motion.   Skin:     General: Skin is warm and dry.      Findings: Rash present.   Neurological:      Mental Status: She is alert and oriented to person, place, and time.   Psychiatric:         Mood and Affect: Mood normal.         Behavior: Behavior normal.         Lab Results:     Chemistry:  Lab Results   Component Value Date     2023    K 3.5 2023    CHLORIDE 107 " 01/16/2023    BUN 8.2 01/16/2023    CREATININE 0.74 01/16/2023    EGFRNORACEVR >60 01/16/2023    GLUCOSE 83 01/16/2023    CALCIUM 9.1 01/16/2023    ALKPHOS 36 (L) 01/16/2023    LABPROT 7.3 01/16/2023    ALBUMIN 4.1 01/16/2023    BILIDIR 0.5 12/02/2021    IBILI 1.10 (H) 12/02/2021    AST 24 01/16/2023    ALT 32 01/16/2023    TSH 2.0514 07/11/2022    IJMGTF5TUIS 0.82 07/11/2022        Lab Results   Component Value Date    HGBA1C 4.6 07/11/2022        Hematology:  Lab Results   Component Value Date    WBC 9.3 01/16/2023    HGB 12.9 01/16/2023    HCT 36.9 (L) 01/16/2023     01/16/2023       Lipid Panel:  Lab Results   Component Value Date    CHOL 130 07/11/2022    HDL 32 (L) 07/11/2022    LDL 80.00 07/11/2022    TRIG 92 07/11/2022    TOTALCHOLEST 4 07/11/2022        Urine:  Lab Results   Component Value Date    COLORUA Colorless (A) 01/16/2023    APPEARANCEUA Clear 01/16/2023    SGUA 1.008 01/16/2023    PHUA 6.0 01/16/2023    PROTEINUA Negative 01/16/2023    GLUCOSEUA Normal 01/16/2023    KETONESUA Negative 01/16/2023    BLOODUA 3+ (A) 01/16/2023    NITRITESUA Negative 01/16/2023    LEUKOCYTESUR Negative 01/16/2023    RBCUA 0-5 01/16/2023    WBCUA 0-5 01/16/2023    BACTERIA Trace (A) 01/16/2023    SQEPUA Trace (A) 01/16/2023    HYALINECASTS None Seen 01/16/2023        Assessment:          ICD-10-CM ICD-9-CM   1. Rash  R21 782.1   2. Mild episode of recurrent major depressive disorder  F33.0 296.31        Plan:     1. Rash  Assessment & Plan:  Instructed to use: All Free & Clear detergent; Dove Sensitive Skin (white bar), Ceravae skin cream; do not use lotions.  Do not use scented lotions, soaps, bubble bath/bath bombs/body wash.  Do not scratch.  Avoid wool clothing, avoid hot showers, use warm, tepid water.        2. Mild episode of recurrent major depressive disorder  Assessment & Plan:  Increase patient's Prozac to 40 mg daily.  Keep appointment to establish with behavioral health in February.      Discussed  use of SSRIs may activate thoughts of suicide.   If patient has any thoughts of harm to self or others stop medication and call clinic or go to ER.   Go to ER for symptoms of confusion, agitation, disorientation, restlessness, fever, tremors, while taking SSRIs as this may be signs of serotonin syndrome.       Orders:  -     FLUoxetine 40 MG capsule; Take 1 capsule (40 mg total) by mouth once daily.  Dispense: 90 capsule; Refill: 0    Other orders  -     triamcinolone acetonide 0.1% (KENALOG) 0.1 % cream; Apply topically 2 (two) times daily.  Dispense: 45 g; Refill: 2         Future Appointments   Date Time Provider Department [place]   2/1/2024  1:30 PM Lidia Cui NP [facility]   2/15/2024  8:00 AM Yadira Phillips APRN Counts include 234 beds at the [facility]   5/2/2024  8:10 AM Delores Reynoso FNP [facility] [place]    5/10/2024  9:00 AM Enrico Poole MD [facility] [place]         Lidia Cui NP         admission

## 2024-02-01 ENCOUNTER — OFFICE VISIT (OUTPATIENT)
Dept: FAMILY MEDICINE | Facility: CLINIC | Age: 24
End: 2024-02-01
Payer: MEDICAID

## 2024-02-01 DIAGNOSIS — F33.0 MILD EPISODE OF RECURRENT MAJOR DEPRESSIVE DISORDER: ICD-10-CM

## 2024-02-01 PROCEDURE — 99214 OFFICE O/P EST MOD 30 MIN: CPT | Mod: 95,,,

## 2024-02-01 RX ORDER — FLUOXETINE HYDROCHLORIDE 40 MG/1
40 CAPSULE ORAL DAILY
Qty: 90 CAPSULE | Refills: 0 | Status: SHIPPED | OUTPATIENT
Start: 2024-02-01 | End: 2024-03-28

## 2024-02-01 RX ORDER — ONDANSETRON 8 MG/1
8 TABLET, ORALLY DISINTEGRATING ORAL
Qty: 14 TABLET | Refills: 0 | Status: SHIPPED | OUTPATIENT
Start: 2024-02-01 | End: 2024-02-15

## 2024-02-01 NOTE — PROGRESS NOTES
Audio/visual Telehealth Visit     The patient location is:  [place]  The chief complaint leading to consultation is: nausea  Visit type:  Synchronous audio visual  Total time spent with patient: 15 min     Each patient to whom I provide medical services by telemedicine is:  (1) informed of the relationship between the physician and patient and the respective role of any other health care provider with respect to management of the patient; and (2) notified that they may decline to receive medical services by telemedicine and may withdraw from such care at any time. Patient verbally consented to receive this service via audio/visual call.      Patient Name: Aleksandra Smith   : 2000  MRN: [id number]     Subjective:   Patient ID: Aleksandra Smith is a 23 y.o. female.    Chief Complaint:  Nausea when trying to take antidepressant     HPI: 2024:  Patient presents to clinic today virtually to discuss medication, patient was started on Prozac and increase to 40 mg but states she has been nauseous when trying to take it, she does endorse that she has been working more and is only nauseous when she does not eat food, was asking about medications that do not require her to have oral intake of food prior taking medication.  Discussed that it is always best have a little bit of food in her stomach with most SSRIs, she is amenable to start medicine again, we will send Zofran to assist with control while she is waiting to establish with behavioral health on . Patient denies chest pain, palpitations, and shortness of breath.  Patient denies fever, night sweats, chills, nausea, vomiting, diarrhea, constipation, weight loss, and changes in appetite.        ROS:  12 point review of systems conducted, negative except as stated in the history of present illness. See HPI for details.    History:   No past medical history on file.   Past Surgical History:   Procedure Laterality Date    DIAGNOSTIC LAPAROSCOPY N/A  2023    Procedure: LAPAROSCOPY, DIAGNOSTIC;  Surgeon: Arline Valente MD;  Location: [facility] OR;  Service: OB/GYN;  Laterality: N/A;  10 trocar, 10mm lap bag, humi, enseal, methylene blue, doxycycline    EXCISION OF PELVIC MASS  2023    Procedure: EXCISION, MASS, PELVIS;  Surgeon: Arline Valente MD;  Location: [facility] OR;  Service: OB/GYN;;     Family History   Problem Relation Age of Onset    Diabetes Maternal Grandmother     Diabetes Maternal Grandfather     Hypertension Mother       Social History     Tobacco Use    Smoking status: Every Day     Types: Vaping with nicotine     Last attempt to quit:      Years since quittin.0    Smokeless tobacco: Never   Substance and Sexual Activity    Alcohol use: Yes     Comment: occasionally    Drug use: Yes     Types: Marijuana     Comment: occasionally    Sexual activity: Yes     Partners: Male     Birth control/protection: None        Allergies:   Review of patient's allergies indicates:   Allergen Reactions    Penicillins      Unknown: Was told by mother that she was allergic     Objective:   There were no vitals filed for this visit.  There is no height or weight on file to calculate BMI.     Physical Examination:   Physical Exam  Constitutional:       General: She is not in acute distress.     Comments: Limited Physical Exam due to telemedicine visit   Pulmonary:      Effort: Pulmonary effort is normal. No respiratory distress.   Neurological:      Mental Status: She is alert.   Psychiatric:         Mood and Affect: Mood normal.         Behavior: Behavior normal.         Thought Content: Thought content normal. Thought content is not paranoid. Thought content does not include homicidal or suicidal ideation. Thought content does not include homicidal or suicidal plan.         Assessment:     Problem List Items Addressed This Visit       Mild episode of recurrent major depressive disorder    Current Assessment & Plan     Retry patient's Prozac increased to  40 mg daily.  Keep appointment to establish with behavioral health in February.  We will send Zofran to assist with toleration of medicine.  Discussed keeping box of granola bars or snack to help patient with medication.      Discussed use of SSRIs may activate thoughts of suicide.   If patient has any thoughts of harm to self or others stop medication and call clinic or go to ER.   Go to ER for symptoms of confusion, agitation, disorientation, restlessness, fever, tremors, while taking SSRIs as this may be signs of serotonin syndrome.            Relevant Medications    FLUoxetine 40 MG capsule       Plan:   Diagnoses and all orders for this visit:    Mild episode of recurrent major depressive disorder  -     FLUoxetine 40 MG capsule; Take 1 capsule (40 mg total) by mouth once daily.    Other orders  -     ondansetron (ZOFRAN-ODT) 8 MG TbDL; Take 1 tablet (8 mg total) by mouth every 24 hours as needed (nausea).       Follow up in about 4 months (around 6/1/2024), or if symptoms worsen or fail to improve, for follow up with  for anxiety managment .       This note was created with the assistance of a voice recognition software or phone dictation. There may be transcription errors as a result of using this technology however minimal. Effort has been made to assure accuracy of transcription but any obvious errors or omissions should be clarified with the author of the document      This service was not originating from a related E/M service provided within the previous 7 days nor will  to an E/M service or procedure within the next 24 hours or my soonest available appointment.  Prevailing standard of care was able to be met in this audio-visual visit.

## 2024-02-01 NOTE — ASSESSMENT & PLAN NOTE
Retry patient's Prozac increased to 40 mg daily.  Keep appointment to establish with behavioral health in February.  We will send Zofran to assist with toleration of medicine.  Discussed keeping box of granola bars or snack to help patient with medication.      Discussed use of SSRIs may activate thoughts of suicide.   If patient has any thoughts of harm to self or others stop medication and call clinic or go to ER.   Go to ER for symptoms of confusion, agitation, disorientation, restlessness, fever, tremors, while taking SSRIs as this may be signs of serotonin syndrome.

## 2024-02-05 ENCOUNTER — TELEPHONE (OUTPATIENT)
Dept: FAMILY MEDICINE | Facility: CLINIC | Age: 24
End: 2024-02-05
Payer: MEDICAID

## 2024-02-05 NOTE — TELEPHONE ENCOUNTER
"Called and spoke with patient. Verified . Patient is asking if it is ok to take OTC "nasal decongestant, non drowsy PE"  with the medications she is currently on.   "

## 2024-02-05 NOTE — TELEPHONE ENCOUNTER
----- Message from Tiffani Cunningham sent at 2/5/2024 11:13 AM CST -----  Regarding: OTC MED  inquiry  Pt is requesting a call to verify if an OTC medication is safe for her to take.

## 2024-03-01 ENCOUNTER — HOSPITAL ENCOUNTER (EMERGENCY)
Facility: HOSPITAL | Age: 24
Discharge: HOME | End: 2024-03-02
Attending: EMERGENCY MEDICINE
Payer: MEDICAID

## 2024-03-01 DIAGNOSIS — O26.891 ABDOMINAL PAIN DURING PREGNANCY IN FIRST TRIMESTER: Primary | ICD-10-CM

## 2024-03-01 DIAGNOSIS — R10.9 ABDOMINAL PAIN DURING PREGNANCY IN FIRST TRIMESTER: Primary | ICD-10-CM

## 2024-03-01 LAB
ALBUMIN SERPL-MCNC: 4 G/DL (ref 3.5–5)
ALBUMIN/GLOB SERPL: 1.3 RATIO (ref 1.1–2)
ALP SERPL-CCNC: 35 UNIT/L (ref 40–150)
ALT SERPL-CCNC: 34 UNIT/L (ref 0–55)
APPEARANCE UR: CLEAR
AST SERPL-CCNC: 26 UNIT/L (ref 5–34)
B-HCG FREE SERPL-ACNC: ABNORMAL MIU/ML
B-HCG SERPL QL: POSITIVE
BACTERIA #/AREA URNS AUTO: ABNORMAL /HPF
BASOPHILS # BLD AUTO: 0.03 X10(3)/MCL
BASOPHILS NFR BLD AUTO: 0.3 %
BILIRUB SERPL-MCNC: 1 MG/DL
BILIRUB UR QL STRIP.AUTO: NEGATIVE
BUN SERPL-MCNC: 10.5 MG/DL (ref 7–18.7)
CALCIUM SERPL-MCNC: 9.3 MG/DL (ref 8.4–10.2)
CHLORIDE SERPL-SCNC: 106 MMOL/L (ref 98–107)
CO2 SERPL-SCNC: 25 MMOL/L (ref 22–29)
COLOR UR AUTO: COLORLESS
CREAT SERPL-MCNC: 0.73 MG/DL (ref 0.55–1.02)
EOSINOPHIL # BLD AUTO: 0.34 X10(3)/MCL (ref 0–0.9)
EOSINOPHIL NFR BLD AUTO: 3.8 %
ERYTHROCYTE [DISTWIDTH] IN BLOOD BY AUTOMATED COUNT: 12.7 % (ref 11.5–17)
GFR SERPLBLD CREATININE-BSD FMLA CKD-EPI: >60 MLS/MIN/1.73/M2
GLOBULIN SER-MCNC: 3.1 GM/DL (ref 2.4–3.5)
GLUCOSE SERPL-MCNC: 94 MG/DL (ref 74–100)
GLUCOSE UR QL STRIP.AUTO: NORMAL
HCT VFR BLD AUTO: 36.9 % (ref 37–47)
HGB BLD-MCNC: 13 G/DL (ref 12–16)
IMM GRANULOCYTES # BLD AUTO: 0.02 X10(3)/MCL (ref 0–0.04)
IMM GRANULOCYTES NFR BLD AUTO: 0.2 %
KETONES UR QL STRIP.AUTO: ABNORMAL
LEUKOCYTE ESTERASE UR QL STRIP.AUTO: NEGATIVE
LYMPHOCYTES # BLD AUTO: 2.86 X10(3)/MCL (ref 0.6–4.6)
LYMPHOCYTES NFR BLD AUTO: 31.8 %
MCH RBC QN AUTO: 30.2 PG (ref 27–31)
MCHC RBC AUTO-ENTMCNC: 35.2 G/DL (ref 33–36)
MCV RBC AUTO: 85.8 FL (ref 80–94)
MONOCYTES # BLD AUTO: 0.46 X10(3)/MCL (ref 0.1–1.3)
MONOCYTES NFR BLD AUTO: 5.1 %
NEUTROPHILS # BLD AUTO: 5.27 X10(3)/MCL (ref 2.1–9.2)
NEUTROPHILS NFR BLD AUTO: 58.8 %
NITRITE UR QL STRIP.AUTO: NEGATIVE
NRBC BLD AUTO-RTO: 0 %
PH UR STRIP.AUTO: 6.5 [PH]
PLATELET # BLD AUTO: 216 X10(3)/MCL (ref 130–400)
PMV BLD AUTO: 10.1 FL (ref 7.4–10.4)
POTASSIUM SERPL-SCNC: 3.5 MMOL/L (ref 3.5–5.1)
PROT SERPL-MCNC: 7.1 GM/DL (ref 6.4–8.3)
PROT UR QL STRIP.AUTO: NEGATIVE
RBC # BLD AUTO: 4.3 X10(6)/MCL (ref 4.2–5.4)
RBC #/AREA URNS AUTO: ABNORMAL /HPF
RBC UR QL AUTO: NEGATIVE
SODIUM SERPL-SCNC: 137 MMOL/L (ref 136–145)
SP GR UR STRIP.AUTO: 1.01 (ref 1–1.03)
SQUAMOUS #/AREA URNS LPF: ABNORMAL /HPF
UROBILINOGEN UR STRIP-ACNC: NORMAL
WBC # SPEC AUTO: 8.98 X10(3)/MCL (ref 4.5–11.5)
WBC #/AREA URNS AUTO: ABNORMAL /HPF

## 2024-03-01 PROCEDURE — 81001 URINALYSIS AUTO W/SCOPE: CPT

## 2024-03-01 PROCEDURE — 81025 URINE PREGNANCY TEST: CPT

## 2024-03-01 PROCEDURE — 99284 EMERGENCY DEPT VISIT MOD MDM: CPT

## 2024-03-01 PROCEDURE — 85025 COMPLETE CBC W/AUTO DIFF WBC: CPT

## 2024-03-01 PROCEDURE — 84702 CHORIONIC GONADOTROPIN TEST: CPT

## 2024-03-01 PROCEDURE — 80053 COMPREHEN METABOLIC PANEL: CPT

## 2024-03-02 VITALS
DIASTOLIC BLOOD PRESSURE: 89 MMHG | RESPIRATION RATE: 16 BRPM | HEIGHT: 67 IN | HEART RATE: 64 BPM | TEMPERATURE: 97 F | WEIGHT: 235 LBS | BODY MASS INDEX: 36.88 KG/M2 | SYSTOLIC BLOOD PRESSURE: 136 MMHG | OXYGEN SATURATION: 96 %

## 2024-03-02 NOTE — ED PROVIDER NOTES
Encounter Date: 3/1/2024       History     Chief Complaint   Patient presents with    Abdominal Cramping     C/o abd cramping increasingly worsening today. NV. UC confirmed pregnancy a week ago. Appt with OB mar 28. LMP 23. Denies bleeding.      23-year-old female  presents to the emergency department for evaluation of lower abdominal pain, cramping, nausea and vomiting.  Denies any fever or chills.  Denies any vaginal bleeding or discharge.  Has not yet had 1st OB appointment.    The history is provided by the patient.     Review of patient's allergies indicates:   Allergen Reactions    Penicillins      Unknown: Was told by mother that she was allergic     No past medical history on file.  Past Surgical History:   Procedure Laterality Date    DIAGNOSTIC LAPAROSCOPY N/A 2023    Procedure: LAPAROSCOPY, DIAGNOSTIC;  Surgeon: Arline Valente MD;  Location: [facility];  Service: OB/GYN;  Laterality: N/A;  10 trocar, 10mm lap bag, humi, enseal, methylene blue, doxycycline    EXCISION OF PELVIC MASS  2023    Procedure: EXCISION, MASS, PELVIS;  Surgeon: Arline Valente MD;  Location: [facility];  Service: OB/GYN;;     Family History   Problem Relation Age of Onset    Diabetes Maternal Grandmother     Diabetes Maternal Grandfather     Hypertension Mother      Social History     Tobacco Use    Smoking status: Every Day     Types: Vaping with nicotine     Last attempt to quit:      Years since quittin.1    Smokeless tobacco: Never   Substance Use Topics    Alcohol use: Yes     Comment: occasionally    Drug use: Yes     Types: Marijuana     Comment: occasionally     Review of Systems   Constitutional:  Negative for fever.   Respiratory:  Negative for shortness of breath.    Gastrointestinal:  Positive for abdominal pain, nausea and vomiting.   Genitourinary:  Positive for pelvic pain. Negative for dysuria, vaginal bleeding and vaginal discharge.       Physical Exam     Initial Vitals [24]    BP Pulse Resp Temp SpO2   (!) 142/81 77 16 97.4 °F (36.3 °C) 100 %      MAP       --         Physical Exam    Nursing note and vitals reviewed.  Constitutional: She appears well-developed and well-nourished. No distress.   HENT:   Head: Normocephalic and atraumatic.   Mouth/Throat: Oropharynx is clear and moist.   Eyes: Conjunctivae are normal. Pupils are equal, round, and reactive to light.   Neck: Neck supple.   Normal range of motion.  Cardiovascular:  Normal rate, regular rhythm and normal heart sounds.           Pulmonary/Chest: No respiratory distress.   Abdominal: Abdomen is soft. She exhibits no distension. There is no abdominal tenderness.   Musculoskeletal:         General: Normal range of motion.      Cervical back: Normal range of motion and neck supple.     Neurological: She is alert and oriented to person, place, and time. GCS score is 15. GCS eye subscore is 4. GCS verbal subscore is 5. GCS motor subscore is 6.   Skin: Skin is warm and dry. No rash noted.   Psychiatric: She has a normal mood and affect.         ED Course   Procedures  Labs Reviewed   PREGNANCY TEST, URINE RAPID - Abnormal; Notable for the following components:       Result Value    Beta hCG Qualitative, Urine Positive (*)     All other components within normal limits   HCG, QUANTITATIVE - Abnormal; Notable for the following components:    Beta Human Chorionic Gonadotropin Quantitative 54,609.72 (*)     All other components within normal limits   URINALYSIS, REFLEX TO URINE CULTURE - Abnormal; Notable for the following components:    Ketones, UA Trace (*)     All other components within normal limits   COMPREHENSIVE METABOLIC PANEL - Abnormal; Notable for the following components:    Alkaline Phosphatase 35 (*)     All other components within normal limits   CBC WITH DIFFERENTIAL - Abnormal; Notable for the following components:    Hct 36.9 (*)     All other components within normal limits   CBC W/ AUTO DIFFERENTIAL    Narrative:      The following orders were created for panel order CBC Auto Differential.  Procedure                               Abnormality         Status                     ---------                               -----------         ------                     CBC with Differential[[id number]]       Abnormal            Final result                 Please view results for these tests on the individual orders.          Imaging Results              US OB <14 Wks, TransAbd, Single Gestation (Preliminary result)  Result time 03/02/24 01:34:29   Procedure changed from US OB <14 Wks TransAbd & TransVag, Single Gestation (XPD)     Preliminary result by Jose Luis Villegas MD (03/02/24 01:34:29)                   Narrative:    START OF REPORT:  Technique: First trimester ultrasound of the pelvis was performed with transabdominal images being obtained.    Comparison: None.    Clinical history: Pelvic/abd cramping.    FINDINGS:  Uterus: The uterus measures 9.47 cm in sagittal dimension by 6.2 cm in transverse dimension by 6.1 cm in AP dimension.  Fetus: An intrauterine fetal pole is present with fetal heart rate of 165 bpm. The crown rump length (CRL) measures 14.8 mm.  Gestational Sac: A gestational sac is present. The mean sac diameter measures (MSD) 27.7 mm.  Yolk sac: A yolk sac is present.  Gestational age: The LMP is 12/31/2023. The gestational age by ultrasound criteria is 7 weeks 6 days. This corresponds to an ultrasound estimated date of confinement of 10/13/2024.    Adnexa:  Right Ovary: The right ovary is not visualized.  Left Ovary: The left ovary measures 3.35 cm by 2.39 cm by 3.0 cm. The left ovarian blood flow is within normal limits.    Fluid: No free fluid is seen in the pelvis.      Impression:  1. An intrauterine fetal pole is present with fetal heart rate of 165 bpm.  2. The gestational age by ultrasound criteria is 7 weeks 6 days. This corresponds to an ultrasound estimated date of confinement of 10/13/2024.  3. Recommend  serial interval clinical laboratory and ultrasound follow up. Details as above.                                         Medications - No data to display  Medical Decision Making  Amount and/or Complexity of Data Reviewed  Radiology:  Decision-making details documented in ED Course.      ED assessment:    Ms. Smith presented to the emergency department for evaluation of abdominal pain/pelvic pain in early pregnancy.  Afebrile, nontoxic appearing, hemodynamically stable, benign abdominal examination.    Differential diagnosis (including but not limited to):   Abdominal pain in pregnancy, miscarriage, ectopic pregnancy, urinary tract infection, subchorionic hematoma.  Given absence of additional symptoms with no fever, no diarrhea, etc., less likely to be an acute abdominal process such as appendicitis    ED management:   Urine pregnancy test positive.  Quantitative HCG elevated as well.  Laboratory studies with no leukocytosis or other acute abnormality that would suggest an acute intra-abdominal process.  UA with no indication of urinary tract infection.    My independent radiology interpretation:   Ultrasound pelvis:  Visualized IUP, + fetal heart tones documented      Amount and/or Complexity of Data Reviewed  Independent historian: none   Summary of history:   External data reviewed: notes from clinic visits  Summary of data reviewed:  Urgent care clinic visit 02/18/2024.  Pregnancy test was positive at that time.  Was advised to come to the hospital if she develops abdominal pain.  Risk and benefits of testing: discussed   Labs: ordered and reviewed  Radiology: ordered and independent interpretation performed (see above or ED course)      Risk  Shared decision making     Critical Care  none    I, Viridiana Gipson MD personally performed the history, PE, MDM, and procedures as documented above and agree with the scribe's documentation.              ED Course as of 03/02/24 0153   Sat Mar 02, 2024   0108 US OB <14  Wks, TransAbd, Single Gestation  Ultrasound demonstrates viable IUP with appropriate fetal heart rate.  Laboratory studies otherwise are unremarkable with no indication of urinary tract infection.  CBC with no leukocytosis, given no point abdominal tenderness, low Hernandez score, unlikely to have acute abdominal process such as appendicitis.  Instructed to schedule follow up appointment with OBGYN. ED return precautions reviewed at the bedside and provided in the written discharge instructions. All questions answered to the best of my ability.  [KS]      ED Course User Index  [KS] Viridiana Gipson MD                           Clinical Impression:  Final diagnoses:  [O26.891, R10.9] Abdominal pain during pregnancy in first trimester (Primary)          ED Disposition Condition    Discharge Stable          ED Prescriptions    None       Follow-up Information       Follow up With Specialties Details Why Contact Info Additional Information    Montrell Coombs Jr., MD Obstetrics and Gynecology  This is a local OBGYN.  You may consider follow up with this OBGYN or any other OBGYN of your choice [address] [facility] [address]  [phone]       [facility] Family Medicine On 3/28/2024 keep scheduled appointment [address] [facility] [address] [phone] Family Medicine Clinic [address]    [facility] - Emergency Dept Emergency Medicine  As needed, If symptoms worsen [address] [facility] [id number] [phone]              Viridiana Gipson MD  03/02/24 0153

## 2024-03-02 NOTE — FIRST PROVIDER EVALUATION
"Medical screening examination initiated.  I have conducted a focused provider triage encounter, findings are as follows:    Brief history of present illness:  23 year old  female presents to the ER for evaluation of lower abdominal and pelvic cramping worsening 1 day. Patient reports that she went to  and discovered that she was pregnant. LMP 23. She has an appointment with OB scheduled 3/28/2024. She denies any vaginal bleeding or abnormal discharge. She also reports nausea and vomiting for which she has been taking Zofran.     Vitals:    24   BP: (!) 142/81   Pulse: 77   Resp: 16   Temp: 97.4 °F (36.3 °C)   TempSrc: Temporal   SpO2: 100%   Weight: 106.6 kg (235 lb)   Height: 5' 7" (1.702 m)       Pertinent physical exam:  alert, nonlabored, ambulatory    Brief workup plan:  labs    Preliminary workup initiated; this workup will be continued and followed by the physician or advanced practice provider that is assigned to the patient when roomed.  "

## 2024-03-28 ENCOUNTER — OFFICE VISIT (OUTPATIENT)
Dept: FAMILY MEDICINE | Facility: CLINIC | Age: 24
End: 2024-03-28
Payer: MEDICAID

## 2024-03-28 ENCOUNTER — HOSPITAL ENCOUNTER (OUTPATIENT)
Dept: RADIOLOGY | Facility: HOSPITAL | Age: 24
Discharge: HOME | End: 2024-03-28
Attending: OBSTETRICS & GYNECOLOGY | Admitting: OBSTETRICS & GYNECOLOGY
Payer: MEDICAID

## 2024-03-28 VITALS
TEMPERATURE: 98 F | SYSTOLIC BLOOD PRESSURE: 127 MMHG | OXYGEN SATURATION: 98 % | BODY MASS INDEX: 36.36 KG/M2 | WEIGHT: 231.63 LBS | DIASTOLIC BLOOD PRESSURE: 84 MMHG | HEART RATE: 74 BPM | RESPIRATION RATE: 18 BRPM | HEIGHT: 67 IN

## 2024-03-28 DIAGNOSIS — Z3A.11 11 WEEKS GESTATION OF PREGNANCY: ICD-10-CM

## 2024-03-28 DIAGNOSIS — L30.9 ECZEMA, UNSPECIFIED TYPE: ICD-10-CM

## 2024-03-28 DIAGNOSIS — Z34.90 PREGNANCY: ICD-10-CM

## 2024-03-28 DIAGNOSIS — Z34.91 INITIAL OBSTETRIC VISIT IN FIRST TRIMESTER: Primary | ICD-10-CM

## 2024-03-28 DIAGNOSIS — Z86.59 HX OF MAJOR DEPRESSION: ICD-10-CM

## 2024-03-28 DIAGNOSIS — L70.9 ACNE, UNSPECIFIED ACNE TYPE: ICD-10-CM

## 2024-03-28 LAB
ALBUMIN SERPL-MCNC: 4.1 G/DL (ref 3.5–5)
ALBUMIN/GLOB SERPL: 1.2 RATIO (ref 1.1–2)
ALP SERPL-CCNC: 36 UNIT/L (ref 40–150)
ALT SERPL-CCNC: 45 UNIT/L (ref 0–55)
APPEARANCE UR: CLEAR
AST SERPL-CCNC: 30 UNIT/L (ref 5–34)
BACTERIA #/AREA URNS AUTO: ABNORMAL /HPF
BASOPHILS # BLD AUTO: 0.02 X10(3)/MCL
BASOPHILS NFR BLD AUTO: 0.4 %
BILIRUB SERPL-MCNC: 1.8 MG/DL
BILIRUB SERPL-MCNC: NEGATIVE MG/DL
BILIRUB UR QL STRIP.AUTO: NEGATIVE
BLOOD URINE, POC: NEGATIVE
BUN SERPL-MCNC: 6.8 MG/DL (ref 7–18.7)
C TRACH DNA SPEC QL NAA+PROBE: NOT DETECTED
CALCIUM SERPL-MCNC: 9.5 MG/DL (ref 8.4–10.2)
CHLORIDE SERPL-SCNC: 107 MMOL/L (ref 98–107)
CLARITY, POC UA: CLEAR
CO2 SERPL-SCNC: 22 MMOL/L (ref 22–29)
COLOR UR AUTO: COLORLESS
COLOR, POC UA: YELLOW
CREAT SERPL-MCNC: 0.66 MG/DL (ref 0.55–1.02)
EOSINOPHIL # BLD AUTO: 0.19 X10(3)/MCL (ref 0–0.9)
EOSINOPHIL NFR BLD AUTO: 3.8 %
ERYTHROCYTE [DISTWIDTH] IN BLOOD BY AUTOMATED COUNT: 12.8 % (ref 11.5–17)
GFR SERPLBLD CREATININE-BSD FMLA CKD-EPI: >60 MLS/MIN/1.73/M2
GLOBULIN SER-MCNC: 3.4 GM/DL (ref 2.4–3.5)
GLUCOSE SERPL-MCNC: 84 MG/DL (ref 74–100)
GLUCOSE UR QL STRIP.AUTO: NORMAL
GLUCOSE UR QL STRIP: NEGATIVE
GROUP & RH: NORMAL
HBV SURFACE AG SERPL QL IA: NONREACTIVE
HCT VFR BLD AUTO: 38.1 % (ref 37–47)
HCV AB SERPL QL IA: NONREACTIVE
HGB BLD-MCNC: 13.4 G/DL (ref 12–16)
HIV 1+2 AB+HIV1 P24 AG SERPL QL IA: NONREACTIVE
HYALINE CASTS #/AREA URNS LPF: ABNORMAL /LPF
IMM GRANULOCYTES # BLD AUTO: 0.01 X10(3)/MCL (ref 0–0.04)
IMM GRANULOCYTES NFR BLD AUTO: 0.2 %
INDIRECT COOMBS: NORMAL
KETONES UR QL STRIP.AUTO: NEGATIVE
KETONES UR QL STRIP: NEGATIVE
LEUKOCYTE ESTERASE UR QL STRIP.AUTO: NEGATIVE
LEUKOCYTE ESTERASE URINE, POC: NEGATIVE
LYMPHOCYTES # BLD AUTO: 1.5 X10(3)/MCL (ref 0.6–4.6)
LYMPHOCYTES NFR BLD AUTO: 29.7 %
MCH RBC QN AUTO: 30.3 PG (ref 27–31)
MCHC RBC AUTO-ENTMCNC: 35.2 G/DL (ref 33–36)
MCV RBC AUTO: 86.2 FL (ref 80–94)
MONOCYTES # BLD AUTO: 0.34 X10(3)/MCL (ref 0.1–1.3)
MONOCYTES NFR BLD AUTO: 6.7 %
MUCOUS THREADS URNS QL MICRO: ABNORMAL /LPF
N GONORRHOEA DNA SPEC QL NAA+PROBE: NOT DETECTED
NEUTROPHILS # BLD AUTO: 2.99 X10(3)/MCL (ref 2.1–9.2)
NEUTROPHILS NFR BLD AUTO: 59.2 %
NITRITE UR QL STRIP.AUTO: NEGATIVE
NITRITE, POC UA: NEGATIVE
NRBC BLD AUTO-RTO: 0 %
PH UR STRIP.AUTO: 7.5 [PH]
PH, POC UA: 7.5
PLATELET # BLD AUTO: 218 X10(3)/MCL (ref 130–400)
PMV BLD AUTO: 10.4 FL (ref 7.4–10.4)
POTASSIUM SERPL-SCNC: 3.8 MMOL/L (ref 3.5–5.1)
PROT SERPL-MCNC: 7.5 GM/DL (ref 6.4–8.3)
PROT UR QL STRIP.AUTO: NEGATIVE
PROTEIN, POC: NEGATIVE
RBC # BLD AUTO: 4.42 X10(6)/MCL (ref 4.2–5.4)
RBC #/AREA URNS AUTO: ABNORMAL /HPF
RBC UR QL AUTO: NEGATIVE
RPR SER QL: REACTIVE
RPR SER-TITR: ABNORMAL {TITER}
SODIUM SERPL-SCNC: 137 MMOL/L (ref 136–145)
SOURCE (OHS): NORMAL
SP GR UR STRIP.AUTO: 1.01 (ref 1–1.03)
SPECIFIC GRAVITY, POC UA: 1.01
SPECIMEN OUTDATE: NORMAL
SQUAMOUS #/AREA URNS LPF: ABNORMAL /HPF
T PALLIDUM AB SER QL: REACTIVE
UROBILINOGEN UR STRIP-ACNC: NORMAL
UROBILINOGEN, POC UA: NORMAL
WBC # SPEC AUTO: 5.05 X10(3)/MCL (ref 4.5–11.5)
WBC #/AREA URNS AUTO: ABNORMAL /HPF

## 2024-03-28 PROCEDURE — 86592 SYPHILIS TEST NON-TREP QUAL: CPT

## 2024-03-28 PROCEDURE — 85660 RBC SICKLE CELL TEST: CPT

## 2024-03-28 PROCEDURE — 85025 COMPLETE CBC W/AUTO DIFF WBC: CPT

## 2024-03-28 PROCEDURE — 99214 OFFICE O/P EST MOD 30 MIN: CPT | Mod: PBBFAC,25

## 2024-03-28 PROCEDURE — 88174 CYTOPATH C/V AUTO IN FLUID: CPT

## 2024-03-28 PROCEDURE — 86780 TREPONEMA PALLIDUM: CPT

## 2024-03-28 PROCEDURE — 87086 URINE CULTURE/COLONY COUNT: CPT

## 2024-03-28 PROCEDURE — 36415 COLL VENOUS BLD VENIPUNCTURE: CPT

## 2024-03-28 PROCEDURE — 87389 HIV-1 AG W/HIV-1&-2 AB AG IA: CPT

## 2024-03-28 PROCEDURE — 81002 URINALYSIS NONAUTO W/O SCOPE: CPT | Mod: PBBFAC

## 2024-03-28 PROCEDURE — 87340 HEPATITIS B SURFACE AG IA: CPT

## 2024-03-28 PROCEDURE — 86787 VARICELLA-ZOSTER ANTIBODY: CPT

## 2024-03-28 PROCEDURE — 86901 BLOOD TYPING SEROLOGIC RH(D): CPT

## 2024-03-28 PROCEDURE — 87491 CHLMYD TRACH DNA AMP PROBE: CPT

## 2024-03-28 PROCEDURE — 81001 URINALYSIS AUTO W/SCOPE: CPT

## 2024-03-28 PROCEDURE — 76801 OB US < 14 WKS SINGLE FETUS: CPT | Mod: TC

## 2024-03-28 PROCEDURE — 86762 RUBELLA ANTIBODY: CPT

## 2024-03-28 PROCEDURE — 86803 HEPATITIS C AB TEST: CPT

## 2024-03-28 PROCEDURE — 80053 COMPREHEN METABOLIC PANEL: CPT

## 2024-03-28 RX ORDER — ONDANSETRON HYDROCHLORIDE 8 MG/1
8 TABLET, FILM COATED ORAL EVERY 8 HOURS PRN
COMMUNITY
Start: 2024-02-18 | End: 2024-05-27

## 2024-03-28 NOTE — PROGRESS NOTES
OB Office Visit Note    Name: Aleksandra Smith  MRN: [id number]  Date: 2024    Subjective:      Chief Complaint: Initial Prenatal Visit (IOB 11w4d, based on previous US. C/O nausea and vomiting and congestion/post nasal drip. No transportation concerns at this time.)      Aleksandra Smith is a 23 y.o.  at 11w4d with CAROLA 10/13/2024, by Ultrasound    Current issues: has no unusual complaints  N/v - has taken zofran with relief     Chronic issues:   Ovarian cysts: denies dx of PCOS, has laparoscopic ovarian cyst removal in .   Depression: Started on fluoxetine since 2023. When found out she was pregnancy. She is not wanting to continue on medication at this time. Denies previous mental health hospitalizations, SI/HI.   Acne: was previously taking doxycycline, stopped when she found out she was pregnant   Eczema: uses kenalog 0.1% cream as needed, denies recent flares  Hx of syphilis: treated    PMHx: mental health disorder see above  PSHx: abdominal surgery, laparoscopic removal of ovarian cyst in   SH: support at home and occasionally uses vape  FHx: No reported pertinent FHx  Meds:   Prior to Admission medications    Medication Sig Start Date End Date Taking? Authorizing Provider   doxycycline (MONODOX) 100 MG capsule Take 1 capsule (100 mg total) by mouth once daily. 23  Lidia Cui NP   FLUoxetine 40 MG capsule Take 1 capsule (40 mg total) by mouth once daily. 24  Lidia Cui NP   mupirocin (BACTROBAN) 2 % ointment Apply topically 3 (three) times daily.  Patient not taking: Reported on 2023 8/15/23   Lidia Cui NP   triamcinolone acetonide 0.1% (KENALOG) 0.1 % cream Apply topically 2 (two) times daily. 23   Lidia Cui NP     Allergies:   Review of patient's allergies indicates:   Allergen Reactions    Penicillins      Unknown: Was told by mother that she was allergic       Gestational History:   OB History     "Para Term  AB Living   1 0 0 0 0 0   SAB IAB Ectopic Multiple Live Births   0 0 0 0 0      # Outcome Date GA Lbr Carter/2nd Weight Sex Delivery Anes PTL Lv   1 Current                GYNHx:   LMP: No LMP recorded (lmp unknown). Patient is pregnant.    Menarche at 12   Menstrual Hx: irregular, 60 day cycles, 4-5 pads/day, 5 days per period  Hx of birth control: OCP (estrogen/progesterone)   Hx of STDs: chlamydia, syphillis    History of Abnormal PAP: No   2023      Antepartum specific ROS  - Fetal movements: No  - Vaginal bleeding: No  - Vaginal discharge: No  - Loss of fluid: No  - Contractions: No  - Headaches: Yes - relieved with tylenol   - Vision changes: No  - Edema: No    Review of Systems  Constitutional: no fever, no chills  CV: no chest pain  RESP: no SOB  : no dysuria, no hematuria  GI: no constipation, no diarrhea, no nausea, no vomiting  Psych: no depression, no anxiety; No SI/HI    Objective:      Vitals:    24 0910   BP: 127/84   BP Location: Right arm   Patient Position: Sitting   BP Method: Medium (Automatic)   Pulse: 74   Resp: 18   Temp: 98.3 °F (36.8 °C)   TempSrc: Oral   SpO2: 98%   Weight: 105.1 kg (231 lb 9.6 oz)   Height: 5' 7" (1.702 m)     Urine Dip:  Lab Results   Component Value Date    COLORU Yellow 2024    SPECGRAV 1.015 2024    PHUR 7.5 2024    WBCUR negative 2024    NITRITE negative 2024    PROTEINPOC negative 2024    GLUCOSEUR negative 2024    KETONESU negative 2024    UROBILINOGEN 0..2 2024    BILIRUBINPOC negative 2024    RBCUR negative 2024         3/28/2024     9:08 AM 2023    10:00 AM 3/10/2023     9:53 AM 2023     8:41 AM 2023     8:59 AM 2022     1:12 PM   Depression Patient Health Questionnaire   Over the last two weeks how often have you been bothered by little interest or pleasure in doing things Not at all More than half the days Not at all Not at all Not at all Not at all "   Over the last two weeks how often have you been bothered by feeling down, depressed or hopeless Not at all More than half the days Not at all Not at all Not at all Not at all   PHQ-2 Total Score 0 4 0 0 0 0   Over the last two weeks how often have you been bothered by trouble falling or staying asleep, or sleeping too much  Not at all       Over the last two weeks how often have you been bothered by feeling tired or having little energy  More than half the days       Over the last two weeks how often have you been bothered by a poor appetite or overeating  Several days       Over the last two weeks how often have you been bothered by feeling bad about yourself - or that you are a failure or have let yourself or your family down  More than half the days       Over the last two weeks how often have you been bothered by trouble concentrating on things, such as reading the newspaper or watching television  More than half the days       Over the last two weeks how often have you been bothered by moving or speaking so slowly that other people could have noticed. Or the opposite - being so fidgety or restless that you have been moving around a lot more than usual.  More than half the days       Over the last two weeks how often have you been bothered by thoughts that you would be better off dead, or of hurting yourself  Several days       If you checked off any problems, how difficult have these problems made it for you to do your work, take care of things at home or get along with other people?  Very difficult       PHQ-9 Score  14       PHQ-9 Interpretation  Moderate               General:   RESP: clear to auscultation bilaterally, non labored  CV: regular rate and rhythm, no murmurs, no edema  ABD: gravid, nontender, BS+  FHTs: 158 bpm, data from u/s  Fundal height: below level of umbilicus       Initial OB Labs: Ordered 03/28/2024  - Blood Type and Rh: pending  - Antibody Screen: pending  - CBC H/H: pending  - HIV:  pending  - RPR: pendingpending  - GC: pending  - CT: pending  - HBsAg: pending  - HCVAb: pending  - Rubella: pending  - Varicella: pending  - UA & Culture: pending  - Sickle Cell Screen: pending  - PAP: pending  - Contraception:  unknown at this time      Assessment/Plan:     April was seen today for initial prenatal visit.    Diagnoses and all orders for this visit:    Initial obstetric visit in first trimester  11 weeks gestation of pregnancy  -     Hepatitis C Antibody; Future  -     Hepatitis B Surface Antigen; Future  -     Liquid-Based Pap Smear, Screening Screening  -     Sickle Cell Screen; Future  -     Varicella Zoster Antibody, IgG  -     Rubella Antibody, IgG; Future  -     SYPHILIS ANTIBODY (WITH REFLEX RPR); Future  -     Chlamydia/GC, PCR  -     Comprehensive Metabolic Panel; Future  -     CBC Auto Differential; Future  -     Type & Screen; Future  -     HIV 1/2 Ag/Ab (4th Gen); Future  -     Urinalysis  -     Urine Culture High Risk  -     continue PNVs  -     Urine dip reviewed as above  -     Routine labs: as mentioned pending  -     Mother plans to breast and/or bottlefeed  -     Postpartum contraception discussion: PENDING  -     ED precautions discussed in depth: vaginal bleeding or leaking fluid, belly cramping or pain, SOB/chest pain, swelling of the face/lower extremities, vision changes. If don't feel the baby move in over an hour. Severe headache that are not resolved with medication    Hx of major depression  -     PHQ2 score 0 today   -     denies desire to continue medication at this time, will d/c prozac   -     CTM PHQ with upcoming visit, consider restarting fluoxetine at lowest dose pending symptoms     Acne, unspecified acne type  -     discontinue doxycyline  -     Discussed proper facial hygiene including regular face washing with non abrasive agents     Eczema, unspecified type  -     continue kenalog BID PRN only for active itchy or inflamed lesions, avoidance of steroid  overuse. Patient voiced understanding on side effects including, but not limited to hypopigmentation.   -     counseled patient limiting prolonged hot showers, avoidance of soap with fragrances/dyes, avoiding irritating agents, and recommended brands like Dove sensitive skin and use of unscented moisturizer daily including CeraVe or Aquifor         Return to clinic in Follow up in about 4 weeks (around 4/25/2024) for OB .    Tamiko Mckee MD  [employer] , -[facility]

## 2024-03-28 NOTE — PATIENT INSTRUCTIONS
Well Child Exam    About this topic  A well child exam is a visit with your child's doctor to check your child's health. The doctor will check your child's growth, progress, and shot record. It is also a time for you to ask your child's doctor any questions you have about your child's health. Your child will have a full exam during the office visit. Other things that are sometimes checked are hearing, eyesight, and urine or blood tests. The doctor may give shots during your child's well visit.    General    Getting Ready for a Well Child Exam    A well child exam is a good time for you to talk with your child's doctor about any of these topics:    Eating habits or diet    How your child acts    Sleep issues    Growth    Safety    Vaccines    Toilet training    Teen years    How your child is doing in school or any learning concerns    Home life    You may want to make a written list of the things you want to talk about with your child's doctor. Be sure to bring your list of questions to your child's well visit. You may also want to do some research on your own before your office visit by reading books or looking at Web sites. Other family members, child caregivers, and grandparents may be able to help you too. Your child's doctor may ask also you about your family's health history or if your child is around anyone who smokes.    The Exam    The doctor measures your child's weight, height, and sometimes head size or body mass index (BMI). The doctor plots these numbers on a growth curve. The growth curve gives a picture of your baby's growth at each visit. The doctor may check your child's temperature, blood pressure, breathing, and heart rate. The doctor may listen to your child's heart, lungs, and belly. Your doctor will do a full exam of your child from the head to the toes.    Growth and Development Questions    Your doctor will ask you about your child's progress. The doctor will focus on the skills that are  likely to happen at your child's age. Some of these are motor skills like rolling over, walking, and running, while others are social skills, or how your child interacts with other people. Your child's doctor will also ask you how your child is doing in school.    Help for Parents    Your doctor will talk with you about any concerns you have about your child during this visit. The doctor may also talk with you about:    Getting family help or other support    Ways to help your child's brain growth    How your child plays and acts with others    Ways to help your child exercise    Safety    Eating habits    Vaccines    Quitting smoking    Help if you have a low mood after having a baby    Shots or Vaccines    It is important for your child to get shots on time. This protects from very serious illnesses like pertussis, measles, or some kinds of pneumonia. Sometimes, your child may need more than one dose of vaccine. The vaccines used today are safer than ever. Talk to your doctor if you have any questions or concerns about giving your child vaccines.    Well Child Exam Schedule    The American Academy of Pediatrics (AAP) suggests this plan for well child visits:    [place] (3 to 5 days old)    1 month old    2 months old    4 months old    6 months old    9 months old    12 months old    15 months old    18 months old    2 years old    30 months old    3 years old    4 years old    Once each year until age 21    Well child exams are very important. Since your child is healthy at this visit and it is scheduled ahead of time, you can think about things you want to ask your child's doctor. Be sure to follow the above plan for well child visits as well as any other visits your child's doctor suggests.    Where can I learn more?    Centers for Disease Control and Prevention    http://www.cdc.gov/vaccines     Healthy  Children    https://www.healthychildren.org/English/family-life/health-management/Pages/Well-Child-Care-A-Check-Up-for-Success.aspx    Disclaimer.  This generalized information is a limited summary of diagnosis, treatment, and/or medication information. It is not meant to be comprehensive and should be used as a tool to help the user understand and/or assess potential diagnostic and treatment options. It does NOT include all information about conditions, treatments, medications, side effects, or risks that may apply to a specific patient. It is not intended to be medical advice or a substitute for the medical advice, diagnosis, or treatment of a health care provider based on the health care provider's examination and assessment of a patients specific and unique circumstances. Patients must speak with a health care provider for complete information about their health, medical questions, and treatment options, including any risks or benefits regarding use of medications. This information does not endorse any treatments or medications as safe, effective, or approved for treating a specific patient. Up[employer] and its affiliates disclaim any warranty or liability relating to this information or the use thereof. The use of this information is governed by the Terms of Use, available at Terms of Use. ©2022 [employer]Date, Inc[employer] and its affiliates and/or licensors. All rights reserved.

## 2024-03-29 LAB
HGB S BLD QL SOLY: NEGATIVE
RUBV IGG SERPL IA-ACNC: 1.7
RUBV IGG SERPL QL IA: POSITIVE
VZV IGG SER IA-ACNC: 2.1
VZV IGG SER QL IA: POSITIVE

## 2024-03-30 LAB — BACTERIA UR CULT: NORMAL

## 2024-04-01 NOTE — PROGRESS NOTES
Discussed limited use of Kenalog PRN    I have personally reviewed the review of systems (ROS) and past, family and social histories (PFSH) documented above by the resident.  I have reviewed the care furnished by the resident during the encounter, including a review of the patient's medical history, the resident's findings on physical examination, diagnosis, and the treatment plan.  I participated in the management of the patient and was immediately available throughout the encounter.   I was physically present during all key portions of the service(s) provided with the resident.  Services were furnished in a primary care center located in the outpatient department of a [facility].

## 2024-04-02 ENCOUNTER — TELEPHONE (OUTPATIENT)
Dept: FAMILY MEDICINE | Facility: CLINIC | Age: 24
End: 2024-04-02
Payer: MEDICAID

## 2024-04-03 LAB — PSYCHE PATHOLOGY RESULT: NORMAL

## 2024-04-11 ENCOUNTER — TELEPHONE (OUTPATIENT)
Dept: FAMILY MEDICINE | Facility: CLINIC | Age: 24
End: 2024-04-11
Payer: MEDICAID

## 2024-04-24 DIAGNOSIS — Z3A.15 15 WEEKS GESTATION OF PREGNANCY: Primary | ICD-10-CM

## 2024-04-29 ENCOUNTER — OFFICE VISIT (OUTPATIENT)
Dept: FAMILY MEDICINE | Facility: CLINIC | Age: 24
End: 2024-04-29
Payer: MEDICAID

## 2024-04-29 VITALS
WEIGHT: 232.81 LBS | BODY MASS INDEX: 36.54 KG/M2 | HEIGHT: 67 IN | SYSTOLIC BLOOD PRESSURE: 116 MMHG | TEMPERATURE: 98 F | HEART RATE: 93 BPM | DIASTOLIC BLOOD PRESSURE: 78 MMHG | OXYGEN SATURATION: 99 %

## 2024-04-29 DIAGNOSIS — Z3A.16 16 WEEKS GESTATION OF PREGNANCY: Primary | ICD-10-CM

## 2024-04-29 DIAGNOSIS — K30 INDIGESTION: ICD-10-CM

## 2024-04-29 LAB
BILIRUB SERPL-MCNC: NEGATIVE MG/DL
BLOOD URINE, POC: NEGATIVE
CLARITY, POC UA: NORMAL
COLOR, POC UA: YELLOW
GLUCOSE UR QL STRIP: NEGATIVE
KETONES UR QL STRIP: NEGATIVE
LEUKOCYTE ESTERASE URINE, POC: NEGATIVE
NITRITE, POC UA: NEGATIVE
PH, POC UA: 7.5
PROTEIN, POC: NEGATIVE
SPECIFIC GRAVITY, POC UA: 1.02
UROBILINOGEN, POC UA: NORMAL

## 2024-04-29 PROCEDURE — 81002 URINALYSIS NONAUTO W/O SCOPE: CPT | Mod: PBBFAC

## 2024-04-29 PROCEDURE — 36415 COLL VENOUS BLD VENIPUNCTURE: CPT

## 2024-04-29 PROCEDURE — 99213 OFFICE O/P EST LOW 20 MIN: CPT | Mod: PBBFAC

## 2024-04-29 PROCEDURE — 81511 FTL CGEN ABNOR FOUR ANAL: CPT

## 2024-04-29 RX ORDER — FAMOTIDINE 20 MG/1
20 TABLET, FILM COATED ORAL 2 TIMES DAILY
Qty: 60 TABLET | Refills: 2 | Status: SHIPPED | OUTPATIENT
Start: 2024-04-29 | End: 2024-05-27

## 2024-04-29 NOTE — PROGRESS NOTES
OB Office Visit Note    Name: Aleksandra Smith  MRN: [id number]  Date: 2024    Subjective:      Chief Complaint: Routine Prenatal Visit (OB 16 weeks 1 day. ), Headache (Pt states the last x 2 weeks she will get a h/a once everyday. She will take Tynelol to help, but sometimes the h/a will return. ), and Abdominal Cramping (Pt states she's been cramping on and off. States its at the lower bottom of abdominal. )      Aleksandra Smith is a 24 y.o.  at 16w1d with CAROLA 10/13/2024, by Ultrasound    Current issues: Patient reports morning sickness that is tolerable at this time. Also reporting symptoms of GERD/indigestion since pregnancy and would be interested in trying medication for GERD. Reports some lower abdominal cramping pain similar to menses that occurred when she exerted herself at her previous job. No abdominal cramping while at home.    Chronic issues:   Ovarian cysts: denies dx of PCOS, has laparoscopic ovarian cyst removal in .   Depression: Started on fluoxetine since 2023. When found out she was pregnancy. She is not wanting to continue on medication at this time. Denies previous mental health hospitalizations, SI/HI.   Acne: was previously taking doxycycline, stopped when she found out she was pregnant   Eczema: uses kenalog 0.1% cream as needed, denies recent flares  Hx of syphilis: treated by PCP per patient      Meds:   Prior to Admission medications    Medication Sig Start Date End Date Taking? Authorizing Provider   ondansetron (ZOFRAN) 8 MG tablet Take 8 mg by mouth every 8 (eight) hours as needed. 24   Provider, Historical   PNV,calcium 72-iron-folic acid (PRENATAL VITAMIN PLUS LOW IRON) 27 mg iron- 1 mg Tab Take 1 tablet (1 each total) by mouth once daily. 3/28/24 3/28/25  Tamiko Mckee MD   triamcinolone acetonide 0.1% (KENALOG) 0.1 % cream Apply topically 2 (two) times daily. 23   Lidia Cui NP     Allergies:   Review of patient's allergies indicates:   Allergen  "Reactions    Penicillins      Unknown: Was told by mother that she was allergic       Gestational History:   OB History    Para Term  AB Living   1 0 0 0 0 0   SAB IAB Ectopic Multiple Live Births   0 0 0 0 0      # Outcome Date GA Lbr Carter/2nd Weight Sex Type Anes PTL Lv   1 Current                  Antepartum specific ROS  - Fetal movements: no  - Vaginal bleeding: No  - Vaginal discharge: No  - Loss of fluid: No  - Contractions: No  - Headaches: Yes - reports migraine like headaches similar to migraine headaches patient had prior to pregnancy. Will occur every other day to every day. Takes tylenol with some relieve.  - Vision changes: No  - Edema: No    Review of Systems  Constitutional: no fever, no chills  CV: no chest pain  RESP: no SOB  : no dysuria, no hematuria  GI: no constipation, no diarrhea, no nausea, no vomiting  Psych: no depression, no anxiety; No SI/HI    Objective:      Vitals:    24 1004   BP: 116/78   BP Location: Right arm   Patient Position: Sitting   BP Method: Large (Automatic)   Pulse: 93   Temp: 98 °F (36.7 °C)   TempSrc: Oral   SpO2: 99%   Weight: 105.6 kg (232 lb 12.8 oz)   Height: 5' 7" (1.702 m)       Lab Results   Component Value Date    COLORU Yellow 2024    SPECGRAV 1.020 2024    PHUR 7.5 2024    WBCUR negative 2024    NITRITE negative 2024    PROTEINPOC negative 2024    GLUCOSEUR negative 2024    KETONESU negative 2024    UROBILINOGEN 1.0 E.U./dL 2024    BILIRUBINPOC negative 2024    RBCUR negative 2024       General:   RESP: clear to auscultation bilaterally, non labored  CV: regular rate and rhythm, no murmurs, no edema  ABD: gravid, nontender, BS+ soft, nontender, nondistended, no abnormal masses, no epigastric pain and FHT present  FHTs: 145 bpm; (location)  Fundal height: 17 cm    Initial OB Labs: Ordered 3/28/24  - Blood Type and Rh: O+  - Antibody Screen: neg  - CBC H/H: 13.4/38.1  - HIV: " NR  - RPR: Reactive, titer 2 dils (previously treated)  - GC: Neg  - CT: Neg  - HBsAg: NR  - HCVAb: NR  - Rubella: immune  - Varicella: immune  - UA & Culture: Neg  - Sickle Cell Screen: Neg  - PAP: NIL  - Influenza vaccine date: N/A  - Contraception: undecided    15-20 Weeks: Lab Ordered 4/29/2024  - Quad Screen: pending    - 20 wk anatomy US:    28 Week Lab:   - 1H GTT:   - Rhogam:   - Date of Tdap:   - CBC H/H:   - RPR:   - BTL consent:     36 Week Lab:  - CBC H/H:   - RPR:   - GBS Culture:   - HIV:   - Cervical GC:     Urine dip:  Lab Results   Component Value Date    COLORU Yellow 04/29/2024    SPECGRAV 1.020 04/29/2024    PHUR 7.5 04/29/2024    WBCUR negative 04/29/2024    NITRITE negative 04/29/2024    PROTEINPOC negative 04/29/2024    GLUCOSEUR negative 04/29/2024    KETONESU negative 04/29/2024    UROBILINOGEN 1.0 E.U./dL 04/29/2024    BILIRUBINPOC negative 04/29/2024    RBCUR negative 04/29/2024     Assessment/Plan:     April was seen today for routine prenatal visit, headache and abdominal cramping.    Diagnoses and all orders for this visit:    16 weeks gestation of pregnancy  -     POCT urine dipstick without microscope  -     Quad Screen Maternal, Serum    Indigestion  -     famotidine (PEPCID) 20 MG tablet; Take 1 tablet (20 mg total) by mouth 2 (two) times daily.         16 weeks gestation Pregnancy  -     POCT urine dipstick without microscope  -     OB Protocol   -     PNVs  -     Urine dip reviewed as above  -     Routine (initial) labs: as mentioned above/pending  -     Mother plans to breast and/or bottlefeed  -     Postpartum contraception discussion: PENDING  -     Labor precautions discussed in depth  - Discussed adding caffeine when she takes tylenol for headache, will continue to monitor for worsening headache  - Pepcid prescribed for GERD/indigestion symptoms, continue to monitor    Return to clinic in Follow up in about 4 weeks (around 5/27/2024) for Routine OB.    Kan Turner MD  [employer] [facility],  [facility]

## 2024-05-02 LAB
# FETUSES: NORMAL
2ND TRIMESTER 4 SCREEN SERPL-IMP: NORMAL
AFP ADJ MOM SERPL: 0.67 MOM
AFP SERPL IA-MCNC: 17 NG/ML
AGE AT DELIVERY: NORMAL
B-HCG ADJ MOM SERPL: 0.86 MOM
CHORION TYPE: NORMAL
COLLECT DATE: NORMAL
CURRENT SMOKER: NORMAL
FET TS 21 RISK FROM MAT AGE: NORMAL
GA METHOD: NORMAL
GA US.COMPOSITE.EST: NORMAL WK,D
HCG SERPL IA-ACNC: 22.9 IU/ML
HX OF NTD QL: NO
HX OF NTD QL: NO
HX OF TRISOMY 21 QL: NO
IDDM PATIENT QL: NO
INHIBIN A ADJ MOM SERPL: 1 MOM
INHIBIN SERPL-MCNC: 126 PG/ML
IVF PREGNANCY: NO
LABORATORY COMMENT REPORT: NORMAL
M PHYSICIAN PHONE NUMBER: NORMAL
MATERNAL RISK FACTORS: NORMAL
NEURAL TUBE DEFECT RISK FETUS: NORMAL %
RECOM F/U: NORMAL
TEST PERFORMANCE INFO SPEC: NORMAL
TS 18 RISK FETUS: NORMAL
TS 21 RISK FETUS: NORMAL
U ESTRIOL ADJ MOM SERPL: 0.8 MOM
U ESTRIOL SERPL-MCNC: 0.66 NG/ML

## 2024-05-27 ENCOUNTER — OFFICE VISIT (OUTPATIENT)
Dept: FAMILY MEDICINE | Facility: CLINIC | Age: 24
End: 2024-05-27
Payer: MEDICAID

## 2024-05-27 VITALS
SYSTOLIC BLOOD PRESSURE: 117 MMHG | OXYGEN SATURATION: 98 % | WEIGHT: 240 LBS | HEART RATE: 72 BPM | DIASTOLIC BLOOD PRESSURE: 79 MMHG | TEMPERATURE: 98 F | BODY MASS INDEX: 37.67 KG/M2 | HEIGHT: 67 IN

## 2024-05-27 DIAGNOSIS — R51.9 NONINTRACTABLE EPISODIC HEADACHE, UNSPECIFIED HEADACHE TYPE: ICD-10-CM

## 2024-05-27 DIAGNOSIS — Z86.19 HISTORY OF SYPHILIS: ICD-10-CM

## 2024-05-27 DIAGNOSIS — Z3A.20 20 WEEKS GESTATION OF PREGNANCY: Primary | ICD-10-CM

## 2024-05-27 DIAGNOSIS — R09.81 NASAL CONGESTION: ICD-10-CM

## 2024-05-27 DIAGNOSIS — K30 INDIGESTION: ICD-10-CM

## 2024-05-27 DIAGNOSIS — Z86.59 HX OF MAJOR DEPRESSION: ICD-10-CM

## 2024-05-27 LAB
BILIRUB SERPL-MCNC: NEGATIVE MG/DL
BLOOD URINE, POC: NEGATIVE
CLARITY, POC UA: NORMAL
COLOR, POC UA: YELLOW
GLUCOSE UR QL STRIP: NEGATIVE
KETONES UR QL STRIP: NEGATIVE
LEUKOCYTE ESTERASE URINE, POC: NEGATIVE
NITRITE, POC UA: NEGATIVE
PH, POC UA: 7.5
PROTEIN, POC: NEGATIVE
SPECIFIC GRAVITY, POC UA: 1.01
UROBILINOGEN, POC UA: 0.2

## 2024-05-27 PROCEDURE — 99213 OFFICE O/P EST LOW 20 MIN: CPT | Mod: PBBFAC

## 2024-05-27 NOTE — PROGRESS NOTES
OB Office Visit Note    Name: Aleksandra Smith  MRN: [id number]  Date: 2024    Subjective:      Chief Complaint: Routine Prenatal Visit (OB 20^1)      Aleksandra Smith is a 24 y.o.  at 20w1d with CAROLA 10/13/2024, by Ultrasound    Current issues:   -Cough/nasal congestion x couple weeks; improving  -Migraine-like headaches: similar to before pregnancy.  No associated vision changes, aura, nausea/vomiting, photophobia. Occur about every few days. Takes Tylenol with resolution.  -GERD: Tums with relief of indigestion      Chronic issues:   Ovarian cysts: denies dx of PCOS, had laparoscopic ovarian cyst removal in .   Depression: not on medication at this time. Mood stable. Denies previous mental health hospitalizations or SI/HI.    Acne: was previously taking doxycycline, stopped upon pregnancy   Eczema: uses kenalog 0.1% cream as needed, denies recent flares  Hx of syphilis: diagnosed in 2022; s/p tx with doxy 100mg BID x 14 days due to PCN allergy     Meds:   Prior to Admission medications    Medication Sig Start Date End Date Taking? Authorizing Provider   famotidine (PEPCID) 20 MG tablet Take 1 tablet (20 mg total) by mouth 2 (two) times daily. 24  Kan Turner MD   ondansetron (ZOFRAN) 8 MG tablet Take 8 mg by mouth every 8 (eight) hours as needed. 24   Provider, Historical   PNV,calcium 72-iron-folic acid (PRENATAL VITAMIN PLUS LOW IRON) 27 mg iron- 1 mg Tab Take 1 tablet (1 each total) by mouth once daily. 3/28/24 3/28/25  Tamiko Mckee MD   triamcinolone acetonide 0.1% (KENALOG) 0.1 % cream Apply topically 2 (two) times daily. 23   Lidia Cui NP     Allergies:   Review of patient's allergies indicates:   Allergen Reactions    Penicillins      Unknown: Was told by mother that she was allergic       Gestational History:   OB History    Para Term  AB Living   1 0 0 0 0 0   SAB IAB Ectopic Multiple Live Births   0 0 0 0 0      # Outcome Date GA Lbr Carter/2nd  "Weight Sex Type Anes PTL Lv   1 Current                GYNHx:              LMP: No LMP recorded (lmp unknown). Patient is pregnant.               Menarche at 12              Menstrual Hx: irregular, 60 day cycles, 4-5 pads/day, 5 days per period  Hx of birth control: OCP (estrogen/progesterone)              Hx of STDs: chlamydia, syphillis               History of Abnormal PAP: No              2/1/2023      Antepartum specific ROS  - Fetal movements: Yes   - Vaginal bleeding: No  - Vaginal discharge: No  - Loss of fluid: No  - Contractions: No  - Headaches: Yes - see above  - Vision changes: No  - Edema: No    Review of Systems  Constitutional: no fever, no chills  CV: no chest pain  RESP: no SOB  : no dysuria, no hematuria  GI: no constipation, no diarrhea, no nausea, no vomiting  Psych: no depression, no anxiety; No SI/HI    Objective:      Vitals:    05/27/24 0931   BP: 117/79   BP Location: Right arm   Patient Position: Sitting   BP Method: Large (Automatic)   Pulse: 72   Temp: 98.1 °F (36.7 °C)   TempSrc: Oral   SpO2: 98%   Weight: 108.9 kg (240 lb)   Height: 5' 7" (1.702 m)       Lab Results   Component Value Date    COLORU Yellow 05/27/2024    SPECGRAV 1.015 05/27/2024    PHUR 7.5 05/27/2024    WBCUR NEGATIVE 05/27/2024    NITRITE NEGATIVE 05/27/2024    PROTEINPOC NEGATIVE 05/27/2024    GLUCOSEUR NEGATIVE 05/27/2024    KETONESU NEGATIVE 05/27/2024    UROBILINOGEN 0.2 05/27/2024    BILIRUBINPOC NEGATIVE 05/27/2024    RBCUR NEGATIVE 05/27/2024       General: NAD  RESP: clear to auscultation bilaterally, non labored breathing  CV: regular rate and rhythm, no murmurs, no edema  ABD: gravid, nontender, BS+   FHTs: 145 bpm; (RLQ)  Fundal height: 21cm  Cervix: not digitally examined    Initial OB Labs: Ordered 3/28/24  - Blood Type and Rh: O+  - Antibody Screen: neg  - CBC H/H: 13.4/38.1  - HIV: NR  - RPR: Reactive, titer 2 dils (previously treated)  - GC: Neg  - CT: Neg  - HBsAg: NR  - HCVAb: NR  - Rubella: " immune  - Varicella: immune  - UA & Culture: Neg  - Sickle Cell Screen: Neg  - PAP: NIL  - Influenza vaccine date: N/A  - Contraception: undecided    15-20 Weeks: Lab Ordered 4/29/2024  - Quad Screen: normal risk    - 20 wk anatomy US: scheduled for 5/28/24    28 Week Lab:   - 1H GTT:   - Rhogam:   - Date of Tdap:   - CBC H/H:   - RPR:   - BTL consent:     36 Week Lab:  - CBC H/H:   - RPR:   - GBS Culture:   - HIV:   - Cervical GC:         Assessment/Plan:     April was seen today for routine prenatal visit.    Diagnoses and all orders for this visit:    20 weeks gestation of pregnancy  -PNVs  -Urine dip reviewed as above  -Routine labs: as mentioned above/reviewed  -Mother plans to breast and/or bottlefeed  -Postpartum contraception discussion: PENDING  -ED precautions discussed in depth: vaginal bleeding or leaking fluid, belly cramping or pain, SOB/chest pain, swelling of the face/lower extremities, vision changes. If don't feel the baby move in over an hour. Severe headache not resolved with medication.  -patient not an [facility] patient    Hx of major depression  -mood stable on no meds    Nasal congestion - improving  -exam overall unremarkable  -handout for safe OTC meds in pregnancy provided    Indigestion  -Tums as needed    Nonintractable episodic headache  -similar to headaches experienced prior to pregnancy  -relieved with Tylenol  -ED precautions discussed if severe HA associated with changes in vision and/or not resolved with meds    History of syphilis  -not during pregnancy - diagnosed 6/2022. S/p treatment with doxycycline x 14d.       Follow up in about 4 weeks (around 6/24/2024) for OB.    Leslie Kirby MD  [employer] , [facility]

## 2024-05-28 ENCOUNTER — PROCEDURE VISIT (OUTPATIENT)
Dept: MATERNAL FETAL MEDICINE | Facility: CLINIC | Age: 24
End: 2024-05-28
Payer: MEDICAID

## 2024-05-28 ENCOUNTER — OFFICE VISIT (OUTPATIENT)
Dept: MATERNAL FETAL MEDICINE | Facility: CLINIC | Age: 24
End: 2024-05-28
Payer: MEDICAID

## 2024-05-28 VITALS
HEART RATE: 92 BPM | WEIGHT: 240.31 LBS | BODY MASS INDEX: 37.72 KG/M2 | HEIGHT: 67 IN | DIASTOLIC BLOOD PRESSURE: 85 MMHG | SYSTOLIC BLOOD PRESSURE: 133 MMHG

## 2024-05-28 DIAGNOSIS — O99.212 OBESITY AFFECTING PREGNANCY IN SECOND TRIMESTER, UNSPECIFIED OBESITY TYPE: ICD-10-CM

## 2024-05-28 DIAGNOSIS — O99.340 DEPRESSION AFFECTING PREGNANCY: ICD-10-CM

## 2024-05-28 DIAGNOSIS — Z3A.15 15 WEEKS GESTATION OF PREGNANCY: ICD-10-CM

## 2024-05-28 DIAGNOSIS — O98.112 SYPHILIS AFFECTING PREGNANCY IN SECOND TRIMESTER: Primary | ICD-10-CM

## 2024-05-28 DIAGNOSIS — F32.A DEPRESSION AFFECTING PREGNANCY: ICD-10-CM

## 2024-05-28 PROBLEM — F33.0 MILD EPISODE OF RECURRENT MAJOR DEPRESSIVE DISORDER: Chronic | Status: ACTIVE | Noted: 2023-11-30

## 2024-05-28 PROCEDURE — 3079F DIAST BP 80-89 MM HG: CPT | Mod: CPTII,S$GLB,, | Performed by: OBSTETRICS & GYNECOLOGY

## 2024-05-28 PROCEDURE — 1160F RVW MEDS BY RX/DR IN RCRD: CPT | Mod: CPTII,S$GLB,, | Performed by: OBSTETRICS & GYNECOLOGY

## 2024-05-28 PROCEDURE — 76811 OB US DETAILED SNGL FETUS: CPT | Mod: S$GLB,,, | Performed by: OBSTETRICS & GYNECOLOGY

## 2024-05-28 PROCEDURE — 99204 OFFICE O/P NEW MOD 45 MIN: CPT | Mod: TH,S$GLB,, | Performed by: OBSTETRICS & GYNECOLOGY

## 2024-05-28 PROCEDURE — 1159F MED LIST DOCD IN RCRD: CPT | Mod: CPTII,S$GLB,, | Performed by: OBSTETRICS & GYNECOLOGY

## 2024-05-28 PROCEDURE — 3075F SYST BP GE 130 - 139MM HG: CPT | Mod: CPTII,S$GLB,, | Performed by: OBSTETRICS & GYNECOLOGY

## 2024-05-28 PROCEDURE — 3008F BODY MASS INDEX DOCD: CPT | Mod: CPTII,S$GLB,, | Performed by: OBSTETRICS & GYNECOLOGY

## 2024-05-28 NOTE — ASSESSMENT & PLAN NOTE
Syphilis is a sexually and congenitally transmitted disease caused by the spirochete Treponema pallidum. Early infections can be found due to symptoms (such as chancre, skin rash, lymphadenopathy, etc.) or due to recent seroconversion.  -Latent syphilis acquired within the preceding year is referred to as early latent syphilis; all other cases of latent syphilis are late latent syphilis or syphilis of unknown duration. T. pallidum can infect the central nervous system and result in neurosyphilis, which can occur at any stage of syphilis. Early neurologic clinical manifestations (i.e., cranial nerve dysfunction, meningitis, stroke, acute altered mental status, and auditory or ophthalmic abnormalities) are usually present within the first few months or years of infection. Late neurologic manifestations (i.e., tabes dorsalis and general paresis) occur 10-30 years after infection.  -Treatment is with penicillin G. A single dose is used to treat early and early latent disease. Late latent and syphilis of unknown duration is treated with 3 weekly doses of IV Penicillin G 2.4 million units. Treatment may cause the Jarisch-Herxheimer reaction, which is an acute febrile reaction frequently accompanied by headache, myalgia, fever, and other symptoms that can occur within the first 24 hours after the initiation of any therapy for syphilis.The Jarisch-Herxheimer reaction occurs most frequently among persons who have early syphilis, presumably because bacterial burdens are higher during these stages. Antipyretics can be used to manage symptoms, but they have not been proven to prevent this reaction. The Jarisch-Herxheimer reaction might induce early labor or cause fetal distress in pregnant women, but this should not prevent or delay therapy.  -Monitoring treatment response consists of following RPR titers with an expected four-fold decline by 6-12 months post treatment. When this reduction is not seen, there could be  possibility of reinfection, neurosyphilis, or slow response to treatment.  -For people at high risk of reinfection, we recommend RPR titers every 1-2 months. Thus, if her titers rise, there may be concern for reinfection. Persons who have signs or symptoms that persist or recur and those with at least a fourfold increase in nontreponemal test titer persisting for >2 weeks likely experienced treatment failure or were re-infected. These persons should be retreated and reevaluated for HIV infection. Because treatment failure usually cannot be reliably distinguished from reinfection with T. pallidum, a CSF analysis also should be performed; treatment should be guided by CSF findings.  -Partners are recommended to be treated with 3 doses of penicillin G, as her syphilis was of unknown duration.  -Congenital syphilis can cause fetal growth restriction, non-immune hydrops,  birth, and stillbirth. Vertical transmission rates are influenced by stage of disease, timing of infection during pregnancy, and treatment. Vertical transmission is increased in women with early stage disease, infection during the second half of pregnancy, and lack of identification and treatment. Penicillin is curative of congenital infection, especially if treated prior to 30 days before delivery. Congenital infection has been diagnosed in 1 to 2 percent of offspring of women adequately treated during pregnancy compared with 70 to 100 percent of offspring of untreated mothers.     Initially dx'ed in 2022. Treated w/ doxycycline by urgent care at that time due to PCN allergy. Initial titer 1:128. Repeat titer 3 months later demonstrated decrease in titer to 1:16. She reports her partner at that time is the current father of the baby. She states he was treated at that time, as well.  Most recent titer during pregnancy (3/28/24)- 1:2  Order provided today for repeat titer.    Recommendations:  Detailed ultrasound via maternal-fetal medicine  to assess for signs of congenital infection  Recommend serial monitoring every 1-2 months if she is at risk for reinfection  However, if four-fold increase in RPR titers, she should be re-treated and sent to infectious disease for follow-up  Assess pelvis for any lesions prior to delivery, as active lesions (chancre, condyloma) can cause  infection  Send placenta to pathology for evaluation   Notification of pediatrics at the time of delivery and close  followup   Partner treatment done, per patient report  Breastfeeding may be done

## 2024-05-28 NOTE — ASSESSMENT & PLAN NOTE
There are  risks associated with obesity which include and increased risk of hypertension, preeclampsia, gestational diabetes, venous thromboembolic disease,  delivery, macrosomia (with resultant shoulder dystocia, obstetric sphincter injury), IUFD, longer first stage of labor, decreased TOLAC success rate, emergent & scheduled  & complications of  (prolonged OR time, delayed delivery, excessive EBL, infection, wound separation/infection, higher spinal failure rate, more difficult intubation).  Obesity is also associated with fetal anomalies, specifically neural tube defects.  Studies have shown that the rate of complication increases with rising BMI and thus pregnancies with maternal morbid obesity are at increased risk.      BMI 36    Recommendations:  TWG goal is 11-20 lbs  Screen for signs/symptoms of obstructive sleep apnea  Nutritionist consult offered (this is to be ordered by primary OB provider)  Consider early 1 hr GCT (not completed); routine at 24 weeks gestation  Consider low dose aspirin 81 mg daily for preeclampsia risk reduction  Targeted anatomical survey scheduled at 18-20 weeks (started today, some views limited)  Fetal growth ultrasound at 32 weeks if BMI >= 40  Lovenox 40 mg BID for VTE prophylaxis while admitted to the hospital (antepartum or postpartum) if BMI >= 40.   Encouraged breastfeeding  Postpartum lifestyle modifications & weight loss

## 2024-05-28 NOTE — ASSESSMENT & PLAN NOTE
She reports a history of depression. She was taking Fluoxetine prior to pregnancy, but discontinued at +UPT.  She reports stable symptoms without medication. Discussed increased risk for peripartum and postpartum mood disorders. Precautions provided.      It is important to optimize mental health conditions during pregnancy in an effort to reduce the risk of postpartum mood disorders. There are options for management including psychotherapy, counseling, cognitive behavioral therapy, exercise/yoga, journaling, and psychiatry services.

## 2024-05-28 NOTE — PROGRESS NOTES
MATERNAL-FETAL MEDICINE   CONSULT NOTE    Provider requesting consultation: [facility]    SUBJECTIVE:     Ms. Aleksandra Smith is a 24 y.o.  female with IUP at 20w2d who is seen in consultation by MFM for evaluation and management of:  Problem   - Syphilis affecting pregnancy in second trimester   -BMI affecting pregnancy in second trimester   - Depression affecting pregnancy   Mild Episode of Recurrent Major Depressive Disorder     April presents for consultation due to a history of syphilis. She was diagnosed in 2022 by an urgent care. She was prescribed Doxycycline for treatment d/t PCN allergy. Initial titer 1:128. Repeat titer 3 mos later with decrease to 1:16. Titer drawn with routine prenatal labs with current pregnancy- 1:2.  BMI 36 - no early 1h  She has a history of depression. She was on Fluoxetine prior to pregnancy, but discontinued at +UPT. She reports stable mood without medications and denies needs at this time.  Initial /84. Repeat /85       Medication List with Changes/Refills   Current Medications    PNV,CALCIUM 72-IRON-FOLIC ACID (PRENATAL VITAMIN PLUS LOW IRON) 27 MG IRON- 1 MG TAB    Take 1 tablet (1 each total) by mouth once daily.    TRIAMCINOLONE ACETONIDE 0.1% (KENALOG) 0.1 % CREAM    Apply topically 2 (two) times daily.       Review of patient's allergies indicates:   Allergen Reactions    Penicillins      Unknown: Was told by mother that she was allergic       PMH:  Past Medical History:   Diagnosis Date    Asthma     Mental disorder     depression       PObHx:  OB History    Para Term  AB Living   1 0 0 0 0 0   SAB IAB Ectopic Multiple Live Births   0 0 0 0 0      # Outcome Date GA Lbr Carter/2nd Weight Sex Type Anes PTL Lv   1 Current                PSH:  Past Surgical History:   Procedure Laterality Date    DIAGNOSTIC LAPAROSCOPY N/A 2023    Procedure: LAPAROSCOPY, DIAGNOSTIC;  Surgeon: Arline Valente MD;  Location: [facility];  Service: OB/GYN;   "Laterality: N/A;  10 trocar, 10mm lap bag, humi, enseal, methylene blue, doxycycline    EXCISION OF PELVIC MASS  2023    Procedure: EXCISION, MASS, PELVIS;  Surgeon: Arline Valente MD;  Location: [facility];  Service: OB/GYN;;       Family history:family history includes Diabetes in her maternal grandfather and maternal grandmother; Hypertension in her mother.    Social history: reports that she has quit smoking. Her smoking use included vaping with nicotine. She has never been exposed to tobacco smoke. She has never used smokeless tobacco. She reports that she does not currently use alcohol. She reports that she does not currently use drugs after having used the following drugs: Marijuana.    Genetic history: The patient denies any inherited genetic diseases or birth defects in herself or her partner's personal history or family.    Objective:   /85 (BP Location: Left arm, Patient Position: Lying, BP Method: Medium (Automatic))   Pulse 92   Ht 5' 7" (1.702 m)   Wt 109 kg (240 lb 4.8 oz)   LMP  (LMP Unknown)   BMI 37.64 kg/m²     Ultrasound performed. See viewpoint for full ultrasound report.    A detailed fetal anatomic ultrasound examination was performed for the following high risk indication: BMI.   No fetal structural malformations are identified; however, fetal imaging is incomplete today.   A follow-up study will be scheduled to complete the fetal anatomic survey.   Fetal size today is consistent with established gestational age.   Cervical length by TA scanning is normal.   Placental location is posterior without evidence of previa.     ASSESSMENT/PLAN:     24 y.o.  female with IUP at 20w2d     - Syphilis affecting pregnancy in second trimester  Syphilis is a sexually and congenitally transmitted disease caused by the spirochete Treponema pallidum. Early infections can be found due to symptoms (such as chancre, skin rash, lymphadenopathy, etc.) or due to recent seroconversion.  -Latent " syphilis acquired within the preceding year is referred to as early latent syphilis; all other cases of latent syphilis are late latent syphilis or syphilis of unknown duration. T. pallidum can infect the central nervous system and result in neurosyphilis, which can occur at any stage of syphilis. Early neurologic clinical manifestations (i.e., cranial nerve dysfunction, meningitis, stroke, acute altered mental status, and auditory or ophthalmic abnormalities) are usually present within the first few months or years of infection. Late neurologic manifestations (i.e., tabes dorsalis and general paresis) occur 10-30 years after infection.  -Treatment is with penicillin G. A single dose is used to treat early and early latent disease. Late latent and syphilis of unknown duration is treated with 3 weekly doses of IV Penicillin G 2.4 million units. Treatment may cause the Jarisch-Herxheimer reaction, which is an acute febrile reaction frequently accompanied by headache, myalgia, fever, and other symptoms that can occur within the first 24 hours after the initiation of any therapy for syphilis.The Jarisch-Herxheimer reaction occurs most frequently among persons who have early syphilis, presumably because bacterial burdens are higher during these stages. Antipyretics can be used to manage symptoms, but they have not been proven to prevent this reaction. The Jarisch-Herxheimer reaction might induce early labor or cause fetal distress in pregnant women, but this should not prevent or delay therapy.  -Monitoring treatment response consists of following RPR titers with an expected four-fold decline by 6-12 months post treatment. When this reduction is not seen, there could be possibility of reinfection, neurosyphilis, or slow response to treatment.  -For people at high risk of reinfection, we recommend RPR titers every 1-2 months. Thus, if her titers rise, there may be concern for reinfection. Persons who have signs or  symptoms that persist or recur and those with at least a fourfold increase in nontreponemal test titer persisting for >2 weeks likely experienced treatment failure or were re-infected. These persons should be retreated and reevaluated for HIV infection. Because treatment failure usually cannot be reliably distinguished from reinfection with T. pallidum, a CSF analysis also should be performed; treatment should be guided by CSF findings.  -Partners are recommended to be treated with 3 doses of penicillin G, as her syphilis was of unknown duration.  -Congenital syphilis can cause fetal growth restriction, non-immune hydrops,  birth, and stillbirth. Vertical transmission rates are influenced by stage of disease, timing of infection during pregnancy, and treatment. Vertical transmission is increased in women with early stage disease, infection during the second half of pregnancy, and lack of identification and treatment. Penicillin is curative of congenital infection, especially if treated prior to 30 days before delivery. Congenital infection has been diagnosed in 1 to 2 percent of offspring of women adequately treated during pregnancy compared with 70 to 100 percent of offspring of untreated mothers.     Initially dx'ed in 2022. Treated w/ doxycycline by urgent care at that time due to PCN allergy. Initial titer 1:128. Repeat titer 3 months later demonstrated decrease in titer to 1:16. She reports her partner at that time is the current father of the baby. She states he was treated at that time, as well.  Most recent titer during pregnancy (3/28/24)- 1:2  Order provided today for repeat titer.    Recommendations:  Detailed ultrasound via maternal-fetal medicine to assess for signs of congenital infection  Recommend serial monitoring every 1-2 months if she is at risk for reinfection  However, if four-fold increase in RPR titers, she should be re-treated and sent to infectious disease for follow-up  Assess  pelvis for any lesions prior to delivery, as active lesions (chancre, condyloma) can cause  infection  Send placenta to pathology for evaluation   Notification of pediatrics at the time of delivery and close  followup   Partner treatment done, per patient report  Breastfeeding may be done        -BMI affecting pregnancy in second trimester  There are  risks associated with obesity which include and increased risk of hypertension, preeclampsia, gestational diabetes, venous thromboembolic disease,  delivery, macrosomia (with resultant shoulder dystocia, obstetric sphincter injury), IUFD, longer first stage of labor, decreased TOLAC success rate, emergent & scheduled  & complications of  (prolonged OR time, delayed delivery, excessive EBL, infection, wound separation/infection, higher spinal failure rate, more difficult intubation).  Obesity is also associated with fetal anomalies, specifically neural tube defects.  Studies have shown that the rate of complication increases with rising BMI and thus pregnancies with maternal morbid obesity are at increased risk.      BMI 36    Recommendations:  TWG goal is 11-20 lbs  Screen for signs/symptoms of obstructive sleep apnea  Nutritionist consult offered (this is to be ordered by primary OB provider)  Consider early 1 hr GCT (not completed); routine at 24 weeks gestation  Consider low dose aspirin 81 mg daily for preeclampsia risk reduction  Targeted anatomical survey scheduled at 18-20 weeks (started today, some views limited)  Fetal growth ultrasound at 32 weeks if BMI >= 40  Lovenox 40 mg BID for VTE prophylaxis while admitted to the hospital (antepartum or postpartum) if BMI >= 40.   Encouraged breastfeeding  Postpartum lifestyle modifications & weight loss      - Depression affecting pregnancy  She reports a history of depression. She was taking Fluoxetine prior to pregnancy, but discontinued at +UPT.  She reports stable  symptoms without medication. Discussed increased risk for peripartum and postpartum mood disorders. Precautions provided.      It is important to optimize mental health conditions during pregnancy in an effort to reduce the risk of postpartum mood disorders. There are options for management including psychotherapy, counseling, cognitive behavioral therapy, exercise/yoga, journaling, and psychiatry services.         FOLLOW UP:   F/u in 8 weeks for US/MFM visit    This consultation was completed with the assistance of Chantale Strong NP.      Claudia Rivera MD  Maternal Fetal Medicine

## 2024-06-24 ENCOUNTER — OFFICE VISIT (OUTPATIENT)
Dept: FAMILY MEDICINE | Facility: CLINIC | Age: 24
End: 2024-06-24
Payer: MEDICAID

## 2024-06-24 VITALS
SYSTOLIC BLOOD PRESSURE: 129 MMHG | HEART RATE: 82 BPM | BODY MASS INDEX: 38.55 KG/M2 | HEIGHT: 67 IN | RESPIRATION RATE: 20 BRPM | WEIGHT: 245.63 LBS | DIASTOLIC BLOOD PRESSURE: 80 MMHG | TEMPERATURE: 99 F | OXYGEN SATURATION: 100 %

## 2024-06-24 DIAGNOSIS — Z3A.24 24 WEEKS GESTATION OF PREGNANCY: Primary | ICD-10-CM

## 2024-06-24 DIAGNOSIS — O98.112 SYPHILIS AFFECTING PREGNANCY IN SECOND TRIMESTER: ICD-10-CM

## 2024-06-24 DIAGNOSIS — T67.1XXA HEAT SYNCOPE, INITIAL ENCOUNTER: ICD-10-CM

## 2024-06-24 LAB
BILIRUB SERPL-MCNC: NORMAL MG/DL
BLOOD URINE, POC: NORMAL
CLARITY, POC UA: NORMAL
COLOR, POC UA: YELLOW
GLUCOSE UR QL STRIP: NORMAL
KETONES UR QL STRIP: NORMAL
LEUKOCYTE ESTERASE URINE, POC: NORMAL
NITRITE, POC UA: NORMAL
PH, POC UA: 6.5
PROTEIN, POC: NORMAL
SPECIFIC GRAVITY, POC UA: 1.02
UROBILINOGEN, POC UA: 1

## 2024-06-24 PROCEDURE — 99213 OFFICE O/P EST LOW 20 MIN: CPT | Mod: PBBFAC | Performed by: STUDENT IN AN ORGANIZED HEALTH CARE EDUCATION/TRAINING PROGRAM

## 2024-06-24 PROCEDURE — 86592 SYPHILIS TEST NON-TREP QUAL: CPT | Performed by: STUDENT IN AN ORGANIZED HEALTH CARE EDUCATION/TRAINING PROGRAM

## 2024-06-24 PROCEDURE — 36415 COLL VENOUS BLD VENIPUNCTURE: CPT | Performed by: STUDENT IN AN ORGANIZED HEALTH CARE EDUCATION/TRAINING PROGRAM

## 2024-06-24 NOTE — PROGRESS NOTES
"[facility] Clinic Note    Subjective:      Chief Complaint: 24 weeks 1 day gestation of pregnancy      Patient ID: April Luis is a 24 y.o. yo  at 24^1WGA by US. (CAROLA: 10/13/2024).  PCP: Lidia Cui NP     Current issues:   Had episode of syncope while walking outside in heat - pt recalled not eating or drinking at all that day prior to walk. Was caught by partner; denies injury. No recurrence since; pt has been monitoring po intake with labelled water bottle to check hydration consumption. Drinks approx 4 "yulia" cups daily. Feels like she is always drinking water; works in a kitchen. Will get some weakness when standing for too long at work; resolves with eating and drinking water along with rest.    Chronic issues:   Ovarian cysts: denies dx of PCOS, had laparoscopic ovarian cyst removal in .   Depression: not on medication at this time. Mood stable. Denies previous mental health hospitalizations or SI/HI.    Acne: was previously taking doxycycline, stopped upon pregnancy   Eczema: uses kenalog 0.1% cream as needed, denies recent flares  Hx of syphilis: diagnosed in 2022; s/p tx with doxy 100mg BID x 14 days due to PCN allergy   Migraine-like headaches: similar to before pregnancy.resolves with tylenol  GERD: Tums with relief of indigestion    Gestational History:  (date, GA, length labor, BW, sex, type, anes, place, complications)    OB History    Para Term  AB Living   1 0 0 0 0 0   SAB IAB Ectopic Multiple Live Births   0 0 0 0 0      # Outcome Date GA Lbr Carter/2nd Weight Sex Type Anes PTL Lv   1 Current                GYNHx:              LMP: No LMP recorded (lmp unknown). Patient is pregnant.               Menarche at 12              Menstrual Hx: irregular, 60 day cycles, 4-5 pads/day, 5 days per period  Hx of birth control: OCP (estrogen/progesterone)              Hx of STDs: chlamydia, syphillis               History of Abnormal PAP: No              " "2/1/2023    Antepartum specific ROS  - Fetal movements: yes  - Vaginal bleeding: no  - Vaginal discharge: no  - Loss of fluid: no  - Contractions: no  - Headaches: yes, resolves with tylenol  - Vision changes: no  - Edema: no    General ROS  Constitutional: no fever, no chills, no weight loss.  CV: no swelling, no edema, no chest pain.  : no urinary retention, no urinary incontinence, denies dysuria.  GI: no fecal incontinence, no constipation, no diarrhea, no nausea, no vomiting.  RESP: no SOB, no wheezing, no difficulty breathing.  Psych: no depression, no anxiety     Objective:      /80 (BP Location: Right arm, Patient Position: Sitting, BP Method: X-Large (Automatic))   Pulse 82   Temp 98.5 °F (36.9 °C) (Oral)   Resp 20   Ht 5' 7" (1.702 m)   Wt 111.4 kg (245 lb 9.6 oz)   LMP  (LMP Unknown)   SpO2 100%   BMI 38.47 kg/m²   Physical Exam:  Gen: alert and oriented, NAD  Resp: CTA bilaterally, nonlabored breathing  CV: RRR, no murmurs, no edema  Abd: gravid, nontender, +BS  - FHTs: 142-144 bpm  - Fundal height: 25.5cm    Initial OB Labs:  - Blood Type and Rh: O+  - Antibody Screen: neg  - CBC H/H: 13.4/38.1  - HIV: NR  - RPR: Reactive, titer 2 dils (previously treated)  - GC: Neg  - CT: Neg  - HBsAg: NR  - HCVAb: NR  - Rubella: immune  - Varicella: immune  - UA & Culture: Neg  - Sickle Cell Screen: Neg  - PAP: NIL  - Influenza vaccine date: N/A  - Contraception: undecided    15-20 Weeks Lab:  - Quad Screen: normal risk     28 Week Lab:  - 1H GTT:   - Rhogam:   - Date of Tdap:   - CBC H/H:   - Syphilis Ab:   - HIV:     36 Week Lab:  - CBC H/H:   - Syphilis Ab:   - HIV:   - GBS Culture:   - Cervical GC:     Current Outpatient Medications   Medication Instructions    PNV,calcium 72-iron-folic acid (PRENATAL VITAMIN PLUS LOW IRON) 27 mg iron- 1 mg Tab 1 each, Oral, Daily       Lab Results   Component Value Date    COLORU Yellow 06/24/2024    SPECGRAV 1.025 06/24/2024    PHUR 6.5 06/24/2024    WBCUR NEG " 06/24/2024    NITRITE NEG 06/24/2024    PROTEINPOC NEG 06/24/2024    GLUCOSEUR NEG 06/24/2024    KETONESU NEG 06/24/2024    UROBILINOGEN 1.0 06/24/2024    BILIRUBINPOC NEG 06/24/2024    RBCUR NEG 06/24/2024        Assessment/Plan:     1. 24 weeks gestation of pregnancy    2. - Syphilis affecting pregnancy in second trimester    3. Heat syncope, initial encounter      Orders Placed This Encounter    RPR    POCT URINE DIPSTICK WITHOUT MICROSCOPE     - OB protocol  - cont PNVs  - Urine Dipstick reviewed: wnl. Encouraged po hydration  - Routine labs reviewed  - Postpartum contraception: considering nexplanon, still unsure  - Strict labor and ED precautions discussed in depth: Fever, vaginal bleeding or leaking fluid, belly cramping or pain, shortness of breath, chest pain, swelling of the face, hands, ankles, feet, or leg; decreased fetal movement, changes in vision, severe headache that does not resolve with rest or Tylenol.    Encouraged po hydration and monitoring sxs    MFM visit and recs reviewed - will check RPR today to cont monitoring levels. Pt understands if levels rise, will need desensitization for treatment    RTC 4 wks

## 2024-06-24 NOTE — PROGRESS NOTES
Faculty addendum: Patient discussed with resident. Chart was reviewed including vitals, labs, etc. Care provided reasonable and necessary. I participated in the management of the patient and was immediately available throughout the encounter. Services were furnished in a primary care center located in the outpatient department of a [facility] hospital. I agree with the resident's findings and plan as documented in the resident's note.

## 2024-06-25 LAB
RPR SER QL: REACTIVE
RPR SER-TITR: ABNORMAL {TITER}

## 2024-07-17 DIAGNOSIS — O99.340 DEPRESSION AFFECTING PREGNANCY: ICD-10-CM

## 2024-07-17 DIAGNOSIS — O98.112 SYPHILIS AFFECTING PREGNANCY IN SECOND TRIMESTER: Primary | ICD-10-CM

## 2024-07-17 DIAGNOSIS — F32.A DEPRESSION AFFECTING PREGNANCY: ICD-10-CM

## 2024-07-17 DIAGNOSIS — O99.212 OBESITY AFFECTING PREGNANCY IN SECOND TRIMESTER, UNSPECIFIED OBESITY TYPE: ICD-10-CM

## 2024-07-22 ENCOUNTER — OFFICE VISIT (OUTPATIENT)
Dept: MATERNAL FETAL MEDICINE | Facility: CLINIC | Age: 24
End: 2024-07-22
Payer: MEDICAID

## 2024-07-22 ENCOUNTER — OFFICE VISIT (OUTPATIENT)
Dept: FAMILY MEDICINE | Facility: CLINIC | Age: 24
End: 2024-07-22
Payer: MEDICAID

## 2024-07-22 ENCOUNTER — PROCEDURE VISIT (OUTPATIENT)
Dept: MATERNAL FETAL MEDICINE | Facility: CLINIC | Age: 24
End: 2024-07-22
Payer: MEDICAID

## 2024-07-22 VITALS
DIASTOLIC BLOOD PRESSURE: 88 MMHG | WEIGHT: 253.06 LBS | HEIGHT: 67 IN | BODY MASS INDEX: 39.72 KG/M2 | HEART RATE: 94 BPM | SYSTOLIC BLOOD PRESSURE: 132 MMHG

## 2024-07-22 VITALS
WEIGHT: 254.81 LBS | OXYGEN SATURATION: 97 % | BODY MASS INDEX: 39.99 KG/M2 | TEMPERATURE: 98 F | SYSTOLIC BLOOD PRESSURE: 114 MMHG | HEART RATE: 108 BPM | HEIGHT: 67 IN | DIASTOLIC BLOOD PRESSURE: 78 MMHG | RESPIRATION RATE: 17 BRPM

## 2024-07-22 DIAGNOSIS — E66.01 SEVERE OBESITY DUE TO EXCESS CALORIES AFFECTING PREGNANCY IN THIRD TRIMESTER: ICD-10-CM

## 2024-07-22 DIAGNOSIS — F32.A DEPRESSION AFFECTING PREGNANCY: ICD-10-CM

## 2024-07-22 DIAGNOSIS — O35.EXX0 PYELECTASIS OF FETUS ON PRENATAL ULTRASOUND: ICD-10-CM

## 2024-07-22 DIAGNOSIS — O99.340 DEPRESSION AFFECTING PREGNANCY: ICD-10-CM

## 2024-07-22 DIAGNOSIS — O98.113 SYPHILIS AFFECTING PREGNANCY IN THIRD TRIMESTER: Primary | ICD-10-CM

## 2024-07-22 DIAGNOSIS — Z3A.28 28 WEEKS GESTATION OF PREGNANCY: Primary | ICD-10-CM

## 2024-07-22 DIAGNOSIS — O99.213 SEVERE OBESITY DUE TO EXCESS CALORIES AFFECTING PREGNANCY IN THIRD TRIMESTER: ICD-10-CM

## 2024-07-22 DIAGNOSIS — O98.113 SYPHILIS AFFECTING PREGNANCY IN THIRD TRIMESTER: ICD-10-CM

## 2024-07-22 DIAGNOSIS — O99.212 OBESITY AFFECTING PREGNANCY IN SECOND TRIMESTER, UNSPECIFIED OBESITY TYPE: ICD-10-CM

## 2024-07-22 DIAGNOSIS — O98.112 SYPHILIS AFFECTING PREGNANCY IN SECOND TRIMESTER: ICD-10-CM

## 2024-07-22 LAB
BASOPHILS # BLD AUTO: 0.03 X10(3)/MCL
BASOPHILS NFR BLD AUTO: 0.4 %
BILIRUB SERPL-MCNC: NEGATIVE MG/DL
BLOOD URINE, POC: NEGATIVE
CLARITY, POC UA: CLEAR
COLOR, POC UA: YELLOW
EOSINOPHIL # BLD AUTO: 0.14 X10(3)/MCL (ref 0–0.9)
EOSINOPHIL NFR BLD AUTO: 2 %
ERYTHROCYTE [DISTWIDTH] IN BLOOD BY AUTOMATED COUNT: 13 % (ref 11.5–17)
GLUCOSE 1H P 100 G GLC PO SERPL-MCNC: 125 MG/DL (ref 100–180)
GLUCOSE UR QL STRIP: NEGATIVE
HCT VFR BLD AUTO: 35.4 % (ref 37–47)
HGB BLD-MCNC: 12.4 G/DL (ref 12–16)
IMM GRANULOCYTES # BLD AUTO: 0.05 X10(3)/MCL (ref 0–0.04)
IMM GRANULOCYTES NFR BLD AUTO: 0.7 %
KETONES UR QL STRIP: NEGATIVE
LEUKOCYTE ESTERASE URINE, POC: NEGATIVE
LYMPHOCYTES # BLD AUTO: 1.36 X10(3)/MCL (ref 0.6–4.6)
LYMPHOCYTES NFR BLD AUTO: 19 %
MCH RBC QN AUTO: 30.8 PG (ref 27–31)
MCHC RBC AUTO-ENTMCNC: 35 G/DL (ref 33–36)
MCV RBC AUTO: 87.8 FL (ref 80–94)
MONOCYTES # BLD AUTO: 0.34 X10(3)/MCL (ref 0.1–1.3)
MONOCYTES NFR BLD AUTO: 4.7 %
NEUTROPHILS # BLD AUTO: 5.24 X10(3)/MCL (ref 2.1–9.2)
NEUTROPHILS NFR BLD AUTO: 73.2 %
NITRITE, POC UA: NEGATIVE
NRBC BLD AUTO-RTO: 0 %
PH, POC UA: 7
PLATELET # BLD AUTO: 102 X10(3)/MCL (ref 130–400)
PLATELETS.RETICULATED NFR BLD AUTO: 6.6 % (ref 0.9–11.2)
PMV BLD AUTO: 11.3 FL (ref 7.4–10.4)
PROTEIN, POC: NEGATIVE
RBC # BLD AUTO: 4.03 X10(6)/MCL (ref 4.2–5.4)
SPECIFIC GRAVITY, POC UA: 1.01
T PALLIDUM AB SER QL: REACTIVE
UROBILINOGEN, POC UA: 0.2
WBC # BLD AUTO: 7.16 X10(3)/MCL (ref 4.5–11.5)

## 2024-07-22 PROCEDURE — 36415 COLL VENOUS BLD VENIPUNCTURE: CPT

## 2024-07-22 PROCEDURE — 86780 TREPONEMA PALLIDUM: CPT

## 2024-07-22 PROCEDURE — 3079F DIAST BP 80-89 MM HG: CPT | Mod: CPTII,S$GLB,, | Performed by: OBSTETRICS & GYNECOLOGY

## 2024-07-22 PROCEDURE — 82950 GLUCOSE TEST: CPT

## 2024-07-22 PROCEDURE — 86592 SYPHILIS TEST NON-TREP QUAL: CPT

## 2024-07-22 PROCEDURE — 99214 OFFICE O/P EST MOD 30 MIN: CPT | Mod: TH,S$GLB,, | Performed by: OBSTETRICS & GYNECOLOGY

## 2024-07-22 PROCEDURE — 1160F RVW MEDS BY RX/DR IN RCRD: CPT | Mod: CPTII,S$GLB,, | Performed by: OBSTETRICS & GYNECOLOGY

## 2024-07-22 PROCEDURE — 3075F SYST BP GE 130 - 139MM HG: CPT | Mod: CPTII,S$GLB,, | Performed by: OBSTETRICS & GYNECOLOGY

## 2024-07-22 PROCEDURE — 85025 COMPLETE CBC W/AUTO DIFF WBC: CPT

## 2024-07-22 PROCEDURE — 90715 TDAP VACCINE 7 YRS/> IM: CPT | Mod: PBBFAC

## 2024-07-22 PROCEDURE — 99213 OFFICE O/P EST LOW 20 MIN: CPT | Mod: PBBFAC

## 2024-07-22 PROCEDURE — 90471 IMMUNIZATION ADMIN: CPT | Mod: PBBFAC

## 2024-07-22 PROCEDURE — 81002 URINALYSIS NONAUTO W/O SCOPE: CPT | Mod: PBBFAC

## 2024-07-22 PROCEDURE — 1159F MED LIST DOCD IN RCRD: CPT | Mod: CPTII,S$GLB,, | Performed by: OBSTETRICS & GYNECOLOGY

## 2024-07-22 PROCEDURE — 3008F BODY MASS INDEX DOCD: CPT | Mod: CPTII,S$GLB,, | Performed by: OBSTETRICS & GYNECOLOGY

## 2024-07-22 PROCEDURE — 76816 OB US FOLLOW-UP PER FETUS: CPT | Mod: S$GLB,,, | Performed by: OBSTETRICS & GYNECOLOGY

## 2024-07-22 RX ADMIN — TETANUS TOXOID, REDUCED DIPHTHERIA TOXOID AND ACELLULAR PERTUSSIS VACCINE, ADSORBED 0.5 ML: 5; 2.5; 8; 8; 2.5 SUSPENSION INTRAMUSCULAR at 02:07

## 2024-07-22 NOTE — ASSESSMENT & PLAN NOTE
Please see prior documentation for specific counseling.      BMI 40    Recommendations:  TWG goal is 11-20 lbs  Screen for signs/symptoms of obstructive sleep apnea  Nutritionist consult offered (this is to be ordered by primary OB provider)  Consider early 1 hr GCT (not completed); routine at 24 weeks gestation  Consider low dose aspirin 81 mg daily for preeclampsia risk reduction  Targeted anatomical survey scheduled at 18-20 weeks (completed)  Fetal growth ultrasound at 32 weeks if BMI >= 40  Lovenox 40 mg BID for VTE prophylaxis while admitted to the hospital (antepartum or postpartum) if BMI >= 40.   Encouraged breastfeeding  Postpartum lifestyle modifications & weight loss

## 2024-07-22 NOTE — ASSESSMENT & PLAN NOTE
Syphilis is a sexually and congenitally transmitted disease caused by the spirochete Treponema pallidum. Early infections can be found due to symptoms (such as chancre, skin rash, lymphadenopathy, etc.) or due to recent seroconversion.  -Latent syphilis acquired within the preceding year is referred to as early latent syphilis; all other cases of latent syphilis are late latent syphilis or syphilis of unknown duration. T. pallidum can infect the central nervous system and result in neurosyphilis, which can occur at any stage of syphilis. Early neurologic clinical manifestations (i.e., cranial nerve dysfunction, meningitis, stroke, acute altered mental status, and auditory or ophthalmic abnormalities) are usually present within the first few months or years of infection. Late neurologic manifestations (i.e., tabes dorsalis and general paresis) occur 10-30 years after infection.  -Treatment is with penicillin G. A single dose is used to treat early and early latent disease. Late latent and syphilis of unknown duration is treated with 3 weekly doses of IV Penicillin G 2.4 million units. Treatment may cause the Jarisch-Herxheimer reaction, which is an acute febrile reaction frequently accompanied by headache, myalgia, fever, and other symptoms that can occur within the first 24 hours after the initiation of any therapy for syphilis.The Jarisch-Herxheimer reaction occurs most frequently among persons who have early syphilis, presumably because bacterial burdens are higher during these stages. Antipyretics can be used to manage symptoms, but they have not been proven to prevent this reaction. The Jarisch-Herxheimer reaction might induce early labor or cause fetal distress in pregnant women, but this should not prevent or delay therapy.  -Monitoring treatment response consists of following RPR titers with an expected four-fold decline by 6-12 months post treatment. When this reduction is not seen, there could be  possibility of reinfection, neurosyphilis, or slow response to treatment.  -For people at high risk of reinfection, we recommend RPR titers every 1-2 months. Thus, if her titers rise, there may be concern for reinfection. Persons who have signs or symptoms that persist or recur and those with at least a fourfold increase in nontreponemal test titer persisting for >2 weeks likely experienced treatment failure or were re-infected. These persons should be retreated and reevaluated for HIV infection. Because treatment failure usually cannot be reliably distinguished from reinfection with T. pallidum, a CSF analysis also should be performed; treatment should be guided by CSF findings.  -Partners are recommended to be treated with 3 doses of penicillin G, as her syphilis was of unknown duration.  -Congenital syphilis can cause fetal growth restriction, non-immune hydrops,  birth, and stillbirth. Vertical transmission rates are influenced by stage of disease, timing of infection during pregnancy, and treatment. Vertical transmission is increased in women with early stage disease, infection during the second half of pregnancy, and lack of identification and treatment. Penicillin is curative of congenital infection, especially if treated prior to 30 days before delivery. Congenital infection has been diagnosed in 1 to 2 percent of offspring of women adequately treated during pregnancy compared with 70 to 100 percent of offspring of untreated mothers.     Initially dx'ed in 2022. Treated w/ doxycycline by urgent care at that time due to PCN allergy. Initial titer 1:128. Repeat titer 3 months later demonstrated decrease in titer to 1:16. She reports her partner at that time is the current father of the baby. She states he was treated at that time, as well.  Most recent titer during pregnancy (3/28/24)- 1:2  24- RPR titer 1:2    Recommendations:  Detailed ultrasound via maternal-fetal medicine to assess for  signs of congenital infection  Recommend serial monitoring every 1-2 months if she is at risk for reinfection  However, if four-fold increase in RPR titers, she should be re-treated and sent to infectious disease for follow-up  Assess pelvis for any lesions prior to delivery, as active lesions (chancre, condyloma) can cause  infection  Send placenta to pathology for evaluation   Notification of pediatrics at the time of delivery and close  followup   Partner treatment done, per patient report  Breastfeeding may be done

## 2024-07-22 NOTE — PROGRESS NOTES
"[facility] OB OFFICE VISIT NOTE  Aleksandra Smith  [id number]  2024    Chief Complaint: Routine Prenatal Visit (Routine OB 28W1D.     Aleksandra Smith is a 24 y.o. female   at 28w1d CAROLA 10/13/2024, by Ultrasound presenting to [facility] for routine OB visit.    Current Issues: Denies any issues    Chronic Issues:    Depression: not on medication at this time. Mood stable. Denies previous mental health hospitalizations or SI/HI.    Acne: was previously taking doxycycline, stopped upon pregnancy   Eczema: uses kenalog 0.1% cream as needed, denies recent flares  Hx of syphilis: diagnosed in 2022; s/p tx with doxy 100mg BID x 14 days due to PCN allergy   Migraine-like headaches: similar to before pregnancy.resolves with tylenol  GERD: Tums with relief of indigestion    Gestational History:  (date, GA, length labor, BW, sex, type, anes, place, complications)                      OB History    Para Term  AB Living   1 0 0 0 0 0   SAB IAB Ectopic Multiple Live Births      0 0 0 0 0          # Outcome Date GA Lbr Carter/2nd Weight Sex Type Anes PTL Lv   1 Current                  GYNHx:              LMP: No LMP recorded (lmp unknown). Patient is pregnant.               Menarche at 12              Menstrual Hx: irregular, 60 day cycles, 4-5 pads/day, 5 days per period  Hx of birth control: OCP (estrogen/progesterone)              Hx of STDs: chlamydia, syphillis               History of Abnormal PAP: No              2023    Review of Systems   Eyes:  Negative for visual disturbance.   Respiratory:  Negative for chest tightness and shortness of breath.      Antepartum specific   - Fetal movements: yes  - Vaginal bleeding: no  - Vaginal discharge: no  - Loss of fluid: no  - Contractions: no  - Headaches: no  - Vision changes: no  - Edema: no    Blood pressure 114/78, pulse 108, temperature 98.2 °F (36.8 °C), temperature source Oral, resp. rate 17, height 5' 7" (1.702 m), weight 115.6 kg (254 lb 12.8 oz), SpO2 97%. "   Physical Exam  Gen: in no acute distress  CVS: RRR, no rgm  Lungs: CTABL  Abd: Gravid, NT, +BS  Ext: no edema  Skin: no rash  FHT: 145 by doppler  Fundal Height: 29cm    Current Medications:   Current Outpatient Medications   Medication Sig Dispense Refill    PNV,calcium 72-iron-folic acid (PRENATAL VITAMIN PLUS LOW IRON) 27 mg iron- 1 mg Tab Take 1 tablet (1 each total) by mouth once daily. 30 tablet 11     No current facility-administered medications for this visit.       Labs:  Urine dipstick:   Component 14:24   Glucose, UA negative   Bilirubin, POC negative   Ketones, UA negative   Spec Grav UA 1.015   Blood, UA negative   pH, UA 7   Protein, POC negative   Urobilinogen, UA 0.2   Nitrite, UA negative   WBC, UA negative   Color, UA Yellow   Clarity, UA Clear     Initial OB Labs:  - Blood Type and Rh: O+  - Antibody Screen: neg  - CBC H/H: 13.4/38.1  - HIV: NR  - RPR: Reactive, titer 2 dils (previously treated)  - GC: Neg  - CT: Neg  - HBsAg: NR  - HCVAb: NR  - Rubella: immune  - Varicella: immune  - UA & Culture: Neg  - Sickle Cell Screen: Neg  - PAP: NIL  - Influenza vaccine date: N/A  - Contraception: nexplanon    15-20 Weeks Lab:  - Quad Screen: normal risk     28 Week Lab: ordered  - 1H GTT: 125  - Rhogam: not indicated  - Date of Tdap: administered   - CBC H/H: 12.4/35.4  - Syphilis Ab: Reactive       36 Week Lab:  - CBC H/H:   - Syphilis Ab:   - HIV:   - GBS Culture:   - Cervical GC:        Assessment:   1. 28 weeks gestation of pregnancy   - OB Protocol   -Continue PNVs  - Urine dip reviewed as above  - Indicated labs: PENDING  - Mother plans to breast feed   - Postpartum contraception discussion: Considering nexplanon  - Labor precautions discussed in depth     2. Syphilis affecting pregnancy in third trimester   -Follows with MFM  -Pending repeat RPR titer         Return to  clinic in 4 weeks     Orders Placed This Encounter   Procedures    Glucose Tolerance 1 Hour    CBC Auto Differential    SYPHILIS  ANTIBODY (WITH REFLEX RPR)    CBC with Differential    RPR    POCT URINE DIPSTICK WITHOUT MICROSCOPE       Reyes Thomas  [facility] Resident

## 2024-07-22 NOTE — ASSESSMENT & PLAN NOTE
Mild right sided renal pelvic dilation measuring 7.6mm was noted consistent with UTDA1. The kidneys appear normal as does the bladder. She has a low risk quad screen and this is a female fetus.     The finding of mild renal pelvis dilation was discussed with the patient. We reviewed basic anatomy of the renal collecting system and then discussed possible etiologies for renal pelvis dilation. When dilation of the renal pelvis is borderline or mild, most cases will resolve spontaneously. However, if the renal dilation persists in the  period, the most common conditions that can cause renal pelvis dilation are UPJ obstruction (ureteropelvic junction), UVJ obstruction (ureterovesical junction) and VUR (vesicoureteral reflux). These conditions predispose infants/children to urinary tract infection, but can be effectively followed and treated. I explained that the vast majority of infants with these findings will have spontaneous resolution during pregnancy. We will plan to re-evaluate the kidneys at her next office visit.

## 2024-07-22 NOTE — PROGRESS NOTES
"Maternal Fetal Medicine follow up consult    SUBJECTIVE:     April Luis is a 24 y.o.  female with IUP at 28w1d who is seen in follow up consultation by MFM.  Pregnancy complications include:   Problem   - Pyelectasis (right sided) of fetus on prenatal ultrasound   - Syphilis affecting pregnancy in third trimester   -BMI affecting pregnancy in third trimester   - Depression affecting pregnancy     April presents for routine follow up appointment.  She has an appt scheduled w/ primary OB today and should be completing her routine 1h glucose screening.  Denies LOF, VB, contractions. Positive fetal movement.    Previous notes reviewed.   No changes to medical, surgical, family, social, or obstetric history.  Interval history since last MFM visit: see above  Medications reviewed.    Care team members:  [facility] - Primary OB     OBJECTIVE:   /88 (BP Location: Right arm)   Pulse 94   Ht 5' 7" (1.702 m)   Wt 114.8 kg (253 lb 1.4 oz)   LMP  (LMP Unknown)   BMI 39.64 kg/m²     Ultrasound performed. See viewpoint for full ultrasound report.    A viable stock pregnancy is visualized in cephalic presentation.  Estimated fetal weight is at the 57th percentile with an abdominal circumference at the 56th percentile.  New right sided renal pelvis dilation is noted. Cardiac arches remain suboptimal. Amniotic fluid volume is normal.  Placenta is posterior.    ASSESSMENT/PLAN:     24 y.o.  female with IUP at 28w1d    - Syphilis affecting pregnancy in third trimester  Syphilis is a sexually and congenitally transmitted disease caused by the spirochete Treponema pallidum. Early infections can be found due to symptoms (such as chancre, skin rash, lymphadenopathy, etc.) or due to recent seroconversion.  -Latent syphilis acquired within the preceding year is referred to as early latent syphilis; all other cases of latent syphilis are late latent syphilis or syphilis of unknown duration. T. pallidum can infect the " central nervous system and result in neurosyphilis, which can occur at any stage of syphilis. Early neurologic clinical manifestations (i.e., cranial nerve dysfunction, meningitis, stroke, acute altered mental status, and auditory or ophthalmic abnormalities) are usually present within the first few months or years of infection. Late neurologic manifestations (i.e., tabes dorsalis and general paresis) occur 10-30 years after infection.  -Treatment is with penicillin G. A single dose is used to treat early and early latent disease. Late latent and syphilis of unknown duration is treated with 3 weekly doses of IV Penicillin G 2.4 million units. Treatment may cause the Jarisch-Herxheimer reaction, which is an acute febrile reaction frequently accompanied by headache, myalgia, fever, and other symptoms that can occur within the first 24 hours after the initiation of any therapy for syphilis.The Jarisch-Herxheimer reaction occurs most frequently among persons who have early syphilis, presumably because bacterial burdens are higher during these stages. Antipyretics can be used to manage symptoms, but they have not been proven to prevent this reaction. The Jarisch-Herxheimer reaction might induce early labor or cause fetal distress in pregnant women, but this should not prevent or delay therapy.  -Monitoring treatment response consists of following RPR titers with an expected four-fold decline by 6-12 months post treatment. When this reduction is not seen, there could be possibility of reinfection, neurosyphilis, or slow response to treatment.  -For people at high risk of reinfection, we recommend RPR titers every 1-2 months. Thus, if her titers rise, there may be concern for reinfection. Persons who have signs or symptoms that persist or recur and those with at least a fourfold increase in nontreponemal test titer persisting for >2 weeks likely experienced treatment failure or were re-infected. These persons should be  retreated and reevaluated for HIV infection. Because treatment failure usually cannot be reliably distinguished from reinfection with T. pallidum, a CSF analysis also should be performed; treatment should be guided by CSF findings.  -Partners are recommended to be treated with 3 doses of penicillin G, as her syphilis was of unknown duration.  -Congenital syphilis can cause fetal growth restriction, non-immune hydrops,  birth, and stillbirth. Vertical transmission rates are influenced by stage of disease, timing of infection during pregnancy, and treatment. Vertical transmission is increased in women with early stage disease, infection during the second half of pregnancy, and lack of identification and treatment. Penicillin is curative of congenital infection, especially if treated prior to 30 days before delivery. Congenital infection has been diagnosed in 1 to 2 percent of offspring of women adequately treated during pregnancy compared with 70 to 100 percent of offspring of untreated mothers.     Initially dx'ed in 2022. Treated w/ doxycycline by urgent care at that time due to PCN allergy. Initial titer 1:128. Repeat titer 3 months later demonstrated decrease in titer to 1:16. She reports her partner at that time is the current father of the baby. She states he was treated at that time, as well.  Most recent titer during pregnancy (3/28/24)- 1:2  24- RPR titer 1:2    Recommendations:  Detailed ultrasound via maternal-fetal medicine to assess for signs of congenital infection  Recommend serial monitoring every 1-2 months if she is at risk for reinfection  However, if four-fold increase in RPR titers, she should be re-treated and sent to infectious disease for follow-up  Assess pelvis for any lesions prior to delivery, as active lesions (chancre, condyloma) can cause  infection  Send placenta to pathology for evaluation   Notification of pediatrics at the time of delivery and close   followup   Partner treatment done, per patient report  Breastfeeding may be done        - Depression affecting pregnancy  She reports a history of depression. She was taking Fluoxetine prior to pregnancy, but discontinued at +UPT.  She reports stable symptoms without medication. Discussed increased risk for peripartum and postpartum mood disorders. Precautions provided.      It is important to optimize mental health conditions during pregnancy in an effort to reduce the risk of postpartum mood disorders. There are options for management including psychotherapy, counseling, cognitive behavioral therapy, exercise/yoga, journaling, and psychiatry services.       -BMI affecting pregnancy in third trimester  Please see prior documentation for specific counseling.      BMI 40    Recommendations:  TWG goal is 11-20 lbs  Screen for signs/symptoms of obstructive sleep apnea  Nutritionist consult offered (this is to be ordered by primary OB provider)  Consider early 1 hr GCT (not completed); routine at 24 weeks gestation  Consider low dose aspirin 81 mg daily for preeclampsia risk reduction  Targeted anatomical survey scheduled at 18-20 weeks (completed)  Fetal growth ultrasound at 32 weeks if BMI >= 40  Lovenox 40 mg BID for VTE prophylaxis while admitted to the hospital (antepartum or postpartum) if BMI >= 40.   Encouraged breastfeeding  Postpartum lifestyle modifications & weight loss      - Pyelectasis (right sided) of fetus on prenatal ultrasound  Mild right sided renal pelvic dilation measuring 7.6mm was noted consistent with UTDA1. The kidneys appear normal as does the bladder. She has a low risk quad screen and this is a female fetus.     The finding of mild renal pelvis dilation was discussed with the patient. We reviewed basic anatomy of the renal collecting system and then discussed possible etiologies for renal pelvis dilation. When dilation of the renal pelvis is borderline or mild, most cases will resolve  spontaneously. However, if the renal dilation persists in the  period, the most common conditions that can cause renal pelvis dilation are UPJ obstruction (ureteropelvic junction), UVJ obstruction (ureterovesical junction) and VUR (vesicoureteral reflux). These conditions predispose infants/children to urinary tract infection, but can be effectively followed and treated. I explained that the vast majority of infants with these findings will have spontaneous resolution during pregnancy. We will plan to re-evaluate the kidneys at her next office visit.       F/u in 4 weeks for MFM visit /growth ultrasound    Claudia Rivera MD  Maternal Fetal Medicine

## 2024-07-23 LAB
RPR SER QL: REACTIVE
RPR SER-TITR: ABNORMAL {TITER}

## 2024-07-23 NOTE — PROGRESS NOTES
I have discussed the case with the resident and reviewed the resident's history and physical, assessment, plan, and progress note. I agree with the findings.       Joey Strong MD  [facility] - Family Medicine

## 2024-08-19 DIAGNOSIS — O35.EXX0 PYELECTASIS OF FETUS ON PRENATAL ULTRASOUND: Primary | ICD-10-CM

## 2024-08-20 ENCOUNTER — PROCEDURE VISIT (OUTPATIENT)
Dept: MATERNAL FETAL MEDICINE | Facility: CLINIC | Age: 24
End: 2024-08-20
Payer: MEDICAID

## 2024-08-20 ENCOUNTER — OFFICE VISIT (OUTPATIENT)
Dept: MATERNAL FETAL MEDICINE | Facility: CLINIC | Age: 24
End: 2024-08-20
Payer: MEDICAID

## 2024-08-20 VITALS
SYSTOLIC BLOOD PRESSURE: 130 MMHG | WEIGHT: 263 LBS | DIASTOLIC BLOOD PRESSURE: 84 MMHG | BODY MASS INDEX: 41.28 KG/M2 | HEIGHT: 67 IN | HEART RATE: 106 BPM

## 2024-08-20 DIAGNOSIS — F32.A DEPRESSION AFFECTING PREGNANCY: ICD-10-CM

## 2024-08-20 DIAGNOSIS — O99.340 DEPRESSION AFFECTING PREGNANCY: ICD-10-CM

## 2024-08-20 DIAGNOSIS — O35.EXX0 PYELECTASIS OF FETUS ON PRENATAL ULTRASOUND: ICD-10-CM

## 2024-08-20 DIAGNOSIS — E66.01 SEVERE OBESITY DUE TO EXCESS CALORIES AFFECTING PREGNANCY IN THIRD TRIMESTER: ICD-10-CM

## 2024-08-20 DIAGNOSIS — O99.213 SEVERE OBESITY DUE TO EXCESS CALORIES AFFECTING PREGNANCY IN THIRD TRIMESTER: ICD-10-CM

## 2024-08-20 DIAGNOSIS — O98.113 SYPHILIS AFFECTING PREGNANCY IN THIRD TRIMESTER: Primary | ICD-10-CM

## 2024-08-20 PROCEDURE — 1159F MED LIST DOCD IN RCRD: CPT | Mod: CPTII,S$GLB,, | Performed by: OBSTETRICS & GYNECOLOGY

## 2024-08-20 PROCEDURE — 99213 OFFICE O/P EST LOW 20 MIN: CPT | Mod: TH,S$GLB,, | Performed by: OBSTETRICS & GYNECOLOGY

## 2024-08-20 PROCEDURE — 1160F RVW MEDS BY RX/DR IN RCRD: CPT | Mod: CPTII,S$GLB,, | Performed by: OBSTETRICS & GYNECOLOGY

## 2024-08-20 PROCEDURE — 76816 OB US FOLLOW-UP PER FETUS: CPT | Mod: S$GLB,,, | Performed by: OBSTETRICS & GYNECOLOGY

## 2024-08-20 PROCEDURE — 3008F BODY MASS INDEX DOCD: CPT | Mod: CPTII,S$GLB,, | Performed by: OBSTETRICS & GYNECOLOGY

## 2024-08-20 PROCEDURE — 3075F SYST BP GE 130 - 139MM HG: CPT | Mod: CPTII,S$GLB,, | Performed by: OBSTETRICS & GYNECOLOGY

## 2024-08-20 PROCEDURE — 76819 FETAL BIOPHYS PROFIL W/O NST: CPT | Mod: S$GLB,,, | Performed by: OBSTETRICS & GYNECOLOGY

## 2024-08-20 PROCEDURE — 3079F DIAST BP 80-89 MM HG: CPT | Mod: CPTII,S$GLB,, | Performed by: OBSTETRICS & GYNECOLOGY

## 2024-08-20 NOTE — ASSESSMENT & PLAN NOTE
Please see prior documentation for specific counseling.      BMI 41    Recommendations:  TWG goal is 11-20 lbs  Screen for signs/symptoms of obstructive sleep apnea  Nutritionist consult offered (this is to be ordered by primary OB provider)  Consider early 1 hr GCT (not completed); routine at 24 weeks gestation  Consider low dose aspirin 81 mg daily for preeclampsia risk reduction  Targeted anatomical survey scheduled at 18-20 weeks (completed)  Fetal growth ultrasound at 32 weeks if BMI >= 40  Lovenox 40 mg BID for VTE prophylaxis while admitted to the hospital (antepartum or postpartum) if BMI >= 40.   Encouraged breastfeeding  Postpartum lifestyle modifications & weight loss

## 2024-08-20 NOTE — ASSESSMENT & PLAN NOTE
Mild right sided renal pelvic dilation measuring 7.6mm was noted consistent with UTDA1. The kidneys appear normal as does the bladder. She has a low risk quad screen and this is a female fetus.     The finding of mild renal pelvis dilation was discussed with the patient. We reviewed basic anatomy of the renal collecting system and then discussed possible etiologies for renal pelvis dilation. When dilation of the renal pelvis is borderline or mild, most cases will resolve spontaneously. However, if the renal dilation persists in the  period, the most common conditions that can cause renal pelvis dilation are UPJ obstruction (ureteropelvic junction), UVJ obstruction (ureterovesical junction) and VUR (vesicoureteral reflux). These conditions predispose infants/children to urinary tract infection, but can be effectively followed and treated. I explained that the vast majority of infants with these findings will have spontaneous resolution during pregnancy. We will plan to re-evaluate the kidneys at her next office visit.     24- Mild pyelectasis previously noted is no longer clinically significant. Renal pelvic msmts within normal limits for gestational age.

## 2024-08-20 NOTE — PROGRESS NOTES
"Maternal Fetal Medicine follow up consult    SUBJECTIVE:     April Luis is a 24 y.o.  female with IUP at 32w2d who is seen in follow up consultation by MFM.  Pregnancy complications include:   Problem   - Pyelectasis (right sided) of fetus on prenatal ultrasound   - Syphilis affecting pregnancy in third trimester   -BMI affecting pregnancy in third trimester   - Depression affecting pregnancy     April presents for routine follow up appointment.  Denies LOF, VB, contractions. Positive fetal movement.  She is feeling well and has no current complaints.     Previous notes reviewed.   No changes to medical, surgical, family, social, or obstetric history.  Interval history since last MFM visit: see above  Medications reviewed.    Care team members:  [facility] - Primary OB     OBJECTIVE:   /84 (BP Location: Right arm)   Pulse 106   Ht 5' 7" (1.702 m)   Wt 119.3 kg (263 lb 0.1 oz)   LMP  (LMP Unknown)   BMI 41.19 kg/m²     Ultrasound performed. See viewpoint for full ultrasound report.    A viable stock pregnancy is visualized in breech presentation.  Estimated fetal weight is at the 47th percentile with an abdominal circumference at the 72nd percentile.    No fetal abnormalities are noted and pyelectasis is resolved. Aortic arch remains suboptimal secondary to fetal position. Amniotic fluid volume is normal.  Placenta is posterior. Biophysical profile is 8/8.   ASSESSMENT/PLAN:     24 y.o.  female with IUP at 32w2d    - Syphilis affecting pregnancy in third trimester  Syphilis is a sexually and congenitally transmitted disease caused by the spirochete Treponema pallidum. Early infections can be found due to symptoms (such as chancre, skin rash, lymphadenopathy, etc.) or due to recent seroconversion.  -Latent syphilis acquired within the preceding year is referred to as early latent syphilis; all other cases of latent syphilis are late latent syphilis or syphilis of unknown duration. T. pallidum " can infect the central nervous system and result in neurosyphilis, which can occur at any stage of syphilis. Early neurologic clinical manifestations (i.e., cranial nerve dysfunction, meningitis, stroke, acute altered mental status, and auditory or ophthalmic abnormalities) are usually present within the first few months or years of infection. Late neurologic manifestations (i.e., tabes dorsalis and general paresis) occur 10-30 years after infection.  -Treatment is with penicillin G. A single dose is used to treat early and early latent disease. Late latent and syphilis of unknown duration is treated with 3 weekly doses of IV Penicillin G 2.4 million units. Treatment may cause the Jarisch-Herxheimer reaction, which is an acute febrile reaction frequently accompanied by headache, myalgia, fever, and other symptoms that can occur within the first 24 hours after the initiation of any therapy for syphilis.The Jarisch-Herxheimer reaction occurs most frequently among persons who have early syphilis, presumably because bacterial burdens are higher during these stages. Antipyretics can be used to manage symptoms, but they have not been proven to prevent this reaction. The Jarisch-Herxheimer reaction might induce early labor or cause fetal distress in pregnant women, but this should not prevent or delay therapy.  -Monitoring treatment response consists of following RPR titers with an expected four-fold decline by 6-12 months post treatment. When this reduction is not seen, there could be possibility of reinfection, neurosyphilis, or slow response to treatment.  -For people at high risk of reinfection, we recommend RPR titers every 1-2 months. Thus, if her titers rise, there may be concern for reinfection. Persons who have signs or symptoms that persist or recur and those with at least a fourfold increase in nontreponemal test titer persisting for >2 weeks likely experienced treatment failure or were re-infected. These  persons should be retreated and reevaluated for HIV infection. Because treatment failure usually cannot be reliably distinguished from reinfection with T. pallidum, a CSF analysis also should be performed; treatment should be guided by CSF findings.  -Partners are recommended to be treated with 3 doses of penicillin G, as her syphilis was of unknown duration.  -Congenital syphilis can cause fetal growth restriction, non-immune hydrops,  birth, and stillbirth. Vertical transmission rates are influenced by stage of disease, timing of infection during pregnancy, and treatment. Vertical transmission is increased in women with early stage disease, infection during the second half of pregnancy, and lack of identification and treatment. Penicillin is curative of congenital infection, especially if treated prior to 30 days before delivery. Congenital infection has been diagnosed in 1 to 2 percent of offspring of women adequately treated during pregnancy compared with 70 to 100 percent of offspring of untreated mothers.     Initially dx'ed in 2022. Treated w/ doxycycline by urgent care at that time due to PCN allergy. Initial titer 1:128. Repeat titer 3 months later demonstrated decrease in titer to 1:16. She reports her partner at that time is the current father of the baby. She states he was treated at that time, as well.  Most recent titer during pregnancy (3/28/24)- 1:2  24- RPR titer 1:2  24- RPR titer 1:2    Recommendations:  Detailed ultrasound via maternal-fetal medicine to assess for signs of congenital infection  Recommend serial monitoring every 1-2 months if she is at risk for reinfection  However, if four-fold increase in RPR titers, she should be re-treated and sent to infectious disease for follow-up  Assess pelvis for any lesions prior to delivery, as active lesions (chancre, condyloma) can cause  infection  Send placenta to pathology for evaluation   Notification of pediatrics  at the time of delivery and close  followup   Partner treatment done, per patient report  Breastfeeding may be done        -BMI affecting pregnancy in third trimester  Please see prior documentation for specific counseling.      BMI 41    Recommendations:  TWG goal is 11-20 lbs  Screen for signs/symptoms of obstructive sleep apnea  Nutritionist consult offered (this is to be ordered by primary OB provider)  Consider early 1 hr GCT (not completed); routine at 24 weeks gestation  Consider low dose aspirin 81 mg daily for preeclampsia risk reduction  Targeted anatomical survey scheduled at 18-20 weeks (completed)  Fetal growth ultrasound at 32 weeks if BMI >= 40  Lovenox 40 mg BID for VTE prophylaxis while admitted to the hospital (antepartum or postpartum) if BMI >= 40.   Encouraged breastfeeding  Postpartum lifestyle modifications & weight loss      - Depression affecting pregnancy  She reports a history of depression. She was taking Fluoxetine prior to pregnancy, but discontinued at +UPT.  She reports stable symptoms without medication. Discussed increased risk for peripartum and postpartum mood disorders. Precautions provided.      It is important to optimize mental health conditions during pregnancy in an effort to reduce the risk of postpartum mood disorders. There are options for management including psychotherapy, counseling, cognitive behavioral therapy, exercise/yoga, journaling, and psychiatry services.       - Pyelectasis (right sided) of fetus on prenatal ultrasound  Mild right sided renal pelvic dilation measuring 7.6mm was noted consistent with UTDA1. The kidneys appear normal as does the bladder. She has a low risk quad screen and this is a female fetus.     The finding of mild renal pelvis dilation was discussed with the patient. We reviewed basic anatomy of the renal collecting system and then discussed possible etiologies for renal pelvis dilation. When dilation of the renal pelvis is  borderline or mild, most cases will resolve spontaneously. However, if the renal dilation persists in the  period, the most common conditions that can cause renal pelvis dilation are UPJ obstruction (ureteropelvic junction), UVJ obstruction (ureterovesical junction) and VUR (vesicoureteral reflux). These conditions predispose infants/children to urinary tract infection, but can be effectively followed and treated. I explained that the vast majority of infants with these findings will have spontaneous resolution during pregnancy. We will plan to re-evaluate the kidneys at her next office visit.     24- Mild pyelectasis previously noted is no longer clinically significant. Renal pelvic msmts within normal limits for gestational age.     F/u in 4 weeks for [facility] visit /growth ultrasound    Claudia Rivera MD  Maternal Fetal Medicine

## 2024-08-22 ENCOUNTER — OFFICE VISIT (OUTPATIENT)
Dept: FAMILY MEDICINE | Facility: CLINIC | Age: 24
End: 2024-08-22
Payer: MEDICAID

## 2024-08-22 VITALS
DIASTOLIC BLOOD PRESSURE: 79 MMHG | TEMPERATURE: 99 F | WEIGHT: 261 LBS | BODY MASS INDEX: 40.97 KG/M2 | HEART RATE: 98 BPM | HEIGHT: 67 IN | RESPIRATION RATE: 20 BRPM | SYSTOLIC BLOOD PRESSURE: 119 MMHG | OXYGEN SATURATION: 98 %

## 2024-08-22 DIAGNOSIS — Z3A.32 32 WEEKS GESTATION OF PREGNANCY: Primary | ICD-10-CM

## 2024-08-22 DIAGNOSIS — K21.9 GASTROESOPHAGEAL REFLUX DISEASE, UNSPECIFIED WHETHER ESOPHAGITIS PRESENT: ICD-10-CM

## 2024-08-22 LAB
BILIRUB SERPL-MCNC: NORMAL MG/DL
BLOOD URINE, POC: NORMAL
CLARITY, POC UA: NORMAL
COLOR, POC UA: YELLOW
GLUCOSE UR QL STRIP: NORMAL
KETONES UR QL STRIP: NORMAL
LEUKOCYTE ESTERASE URINE, POC: NORMAL
NITRITE, POC UA: NORMAL
PH, POC UA: 7.5
PROTEIN, POC: NORMAL
SPECIFIC GRAVITY, POC UA: 1.01
UROBILINOGEN, POC UA: 0.2

## 2024-08-22 PROCEDURE — 81002 URINALYSIS NONAUTO W/O SCOPE: CPT | Mod: PBBFAC

## 2024-08-22 PROCEDURE — 99213 OFFICE O/P EST LOW 20 MIN: CPT | Mod: PBBFAC

## 2024-08-22 RX ORDER — LANSOPRAZOLE 30 MG/1
30 CAPSULE, DELAYED RELEASE ORAL DAILY
Qty: 30 CAPSULE | Refills: 11 | Status: SHIPPED | OUTPATIENT
Start: 2024-08-22 | End: 2025-08-22

## 2024-08-22 NOTE — PROGRESS NOTES
Discussed with resident at time of encounter 08-22-24.  IUP @ 32 weeks 4 days.  MFM evaluation (08-20-24); maternal syphilis (treated), right pyelectasis; 4 week follow-up scheduled.    Resident's note reviewed 08-22-24.  Agree with assessment; plan of care appropriate.  Professional services provided in an outpatient primary care center affiliated with a teaching institution.

## 2024-08-22 NOTE — PROGRESS NOTES
[facility] Family Medicine OB Clinic Note    Subjective:     Chief Complaint:   Chief Complaint   Patient presents with    32 weeks 4 days gestation of pregnancy       HPI  23 yo  @ 32^4 WGA by 1st trimester US ([facility]OLA: 10/13/2024) presents for routine prenatal visit.    Acute Concerns  Experiencing acid reflux, seems to be getting worse, admits to poor dietary habits, intermittent relief with Tums. Taking PMV daily.  Feeling regular fetal movements.  Denies LOF, vaginal bleeding, contractions.    Chronic Conditions  - Depression: mood stable, no pharmacologic management, denies Si  - Hx of syphilis: diagnosed 2022, treated with doxycycline due to penicillin allergy, initial improvement of dils from 128 to 16, further improvement from 16 to 2 likely due to patient being on chronic doxycycline for hidradenitis   - GERD: relief with Tums    Antepartum Review of Systems  Fetal movements: yes  Vaginal bleeding: no  Vaginal discharge: no  Loss of fluid: no  Contractions: no  Headaches: bitemporal 1-2 times per week, chronic in nature, no changes in headache severity or frequency since becoming pregnant  Vision changes: no  Edema: yes, at end of day after standing for long periods    Objective:     Vitals:    24 0859   BP: 119/79   Pulse: 98   Resp: 20   Temp: 98.5 °F (36.9 °C)       Physical Exam    General: Well developed, no acute distress  CV: RRR, no murmurs, no edema, 2+ peripheral pulses  Resp: CTAB, non labored breathing on room air  Abd: gravid, non-tender, +BS    FH:  32 cm    FHT:  150 bpm    Initial OB Labs  - Blood Type and Rh: O+  - Antibody Screen: neg  - CBC H/H: 13.4/38.1  - HIV: NR  - RPR: reactive, 2 dils (previously treated)  - GC: neg  - CT: neg  - HBsAg: NR  - HCVAb: NR  - Rubella: immune  - Varicella: immune  - UA & Culture: no growth  - Sickle Cell Screen: neg  - PAP: NIL    15-20 Weeks Lab   - Quad Screen: normal risk     28 Week Lab   - 1H GTT: 125   - Rhogam: not indicated  - Date of Tdap:  7/22/24  - CBC H/H: 12.4/35.4  - RPR: reactive, 2 dils    36 Week Lab  - CBC H/H:   - RPR:   - GBS Culture:   - HIV:   - Urine: GC:     Urine Dipstick  Component 09:30   Glucose, UA neg   Bilirubin, POC neg   Ketones, UA neg   Spec Grav UA 1.015   Blood, UA neg   pH, UA 7.5   Protein, POC neg   Urobilinogen, UA 0.2   Nitrite, UA neg   WBC, UA neg   Color, UA Yellow   Clarity, UA Slightly Cloudy       Ultrasound  Initial US  Single viable intrauterine pregnancy at 11 weeks and 5 days with an estimated delivery date of 10/12/2024.    20 wk anatomy US  A detailed fetal anatomic ultrasound examination was performed for the following high risk indication: BMI. No fetal structural malformations are identified; however, fetal imaging is incomplete today. A follow-up study will be scheduled to complete the fetal anatomic survey. Fetal size today is consistent with established gestational age. Cervical length by TA scanning is normal. Placental location is posterior without evidence of previa.     F/u anatomy survey  A viable stock pregnancy is visualized in cephalic presentation. Estimated fetal weight is at the 57th percentile with an abdominal circumference at the 56th percentile. New right sided renal pelvis dilation is noted. Cardiac arches remain suboptimal. Amniotic fluid volume is normal. Placenta is posterior.     Assessment/Plan:       32 weeks gestation of pregnancy  - OB Protocol   - PNVs  - Urine dip reviewed as above  - Routine labs reviewed  - Mother plans to breast feed  - Postpartum contraception discussion: considering Nexplanon  - Labor/ED precautions discussed    Syphilis affecting pregnancy in third trimester  - Follows with MFM  - Treated, dils went from 1:128 to 1:2    GERD  - Initiate Prevacid  - Avoid triggers, spicy foods, fried foods  - Eat at least 2-3 hours prior to bed time    Follow-up: 2 weeks for prenatal care    Jamal Grullon MD  \[facility] [place] [facility]\"" Family Medicine - PGY-III

## 2024-09-06 ENCOUNTER — OFFICE VISIT (OUTPATIENT)
Dept: FAMILY MEDICINE | Facility: CLINIC | Age: 24
End: 2024-09-06
Payer: MEDICAID

## 2024-09-06 VITALS
DIASTOLIC BLOOD PRESSURE: 84 MMHG | WEIGHT: 267.81 LBS | OXYGEN SATURATION: 96 % | RESPIRATION RATE: 20 BRPM | HEART RATE: 60 BPM | TEMPERATURE: 98 F | BODY MASS INDEX: 42.03 KG/M2 | SYSTOLIC BLOOD PRESSURE: 129 MMHG | HEIGHT: 67 IN

## 2024-09-06 DIAGNOSIS — Z3A.35 35 WEEKS GESTATION OF PREGNANCY: Primary | ICD-10-CM

## 2024-09-06 LAB
BILIRUB SERPL-MCNC: NEGATIVE MG/DL
BLOOD URINE, POC: NEGATIVE
CLARITY, POC UA: CLEAR
COLOR, POC UA: YELLOW
GLUCOSE UR QL STRIP: NEGATIVE
KETONES UR QL STRIP: NEGATIVE
LEUKOCYTE ESTERASE URINE, POC: NEGATIVE
NITRITE, POC UA: NEGATIVE
PH, POC UA: 7
PROTEIN, POC: NEGATIVE
SPECIFIC GRAVITY, POC UA: 1.01
UROBILINOGEN, POC UA: 0.2

## 2024-09-06 PROCEDURE — 99213 OFFICE O/P EST LOW 20 MIN: CPT | Mod: PBBFAC

## 2024-09-06 NOTE — PROGRESS NOTES
OB Office Visit Note    Name: Aleksandra Smith  MRN: [id number]  Date: 2024    Subjective:      Chief Complaint: Follow-up (34 wks 5 days o.b with lt. Lower abd. pain)      Aleksandra Smith is a 24 y.o.  at 34w5d with CAROLA 10/13/2024, by Ultrasound    Current issues: has no unusual complaints. Was having some left inguinal pain that started recently and is mild and sharp in nature. Was not sure if pain is related to contractions. Explained to patient that round ligament pain is normal in pregnancy.    Chronic Conditions  - Depression: mood stable, no pharmacologic management, denies Si  - Hx of syphilis: diagnosed 2022, treated with doxycycline due to penicillin allergy, initial improvement of dils from 128 to 16, further improvement from 16 to 2 likely due to patient being on chronic doxycycline for hidradenitis   - GERD: relief with Tums      Meds:   Prior to Admission medications    Medication Sig Start Date End Date Taking? Authorizing Provider   lansoprazole (PREVACID) 30 MG capsule Take 1 capsule (30 mg total) by mouth once daily. 24  Jamal Grullon MD   PNV,calcium 72-iron-folic acid (PRENATAL VITAMIN PLUS LOW IRON) 27 mg iron- 1 mg Tab Take 1 tablet (1 each total) by mouth once daily. 3/28/24 3/28/25  Tamiko Mckee MD     Allergies:   Review of patient's allergies indicates:   Allergen Reactions    Penicillins      Unknown: Was told by mother that she was allergic       Gestational History:   OB History    Para Term  AB Living   1 0 0 0 0 0   SAB IAB Ectopic Multiple Live Births   0 0 0 0 0      # Outcome Date GA Lbr Carter/2nd Weight Sex Type Anes PTL Lv   1 Current                    Antepartum specific ROS  - Fetal movements: Yes   - Vaginal bleeding: No  - Vaginal discharge: No  - Loss of fluid: No  - Contractions: No  - Headaches: Yes - having headaches more frequently that are relieved with tylenol. Headache will recur occasionally but improves with medication and sleeping.  "No other associated symptoms when headaches occur  - Vision changes: No  - Edema: Yes - feet will swell at end of workday after standing all day    Review of Systems  Constitutional: no fever, no chills  CV: no chest pain  RESP: no SOB  : no dysuria, no hematuria  GI: no constipation, no diarrhea, no nausea, no vomiting  Psych: no depression, no anxiety; No SI/HI    Objective:      Vitals:    09/06/24 0834   BP: 129/84   BP Location: Left arm   Patient Position: Sitting   BP Method: Large (Automatic)   Pulse: 60   Resp: 20   Temp: 98.1 °F (36.7 °C)   TempSrc: Oral   SpO2: 96%   Weight: 121.5 kg (267 lb 12.8 oz)   Height: 5' 7" (1.702 m)       Lab Results   Component Value Date    COLORU Yellow 09/06/2024    SPECGRAV 1.015 09/06/2024    PHUR 7.0 09/06/2024    WBCUR negative 09/06/2024    NITRITE negative 09/06/2024    PROTEINPOC negative 09/06/2024    GLUCOSEUR negative\ 09/06/2024    KETONESU negative 09/06/2024    UROBILINOGEN 0.2 09/06/2024    BILIRUBINPOC negative 09/06/2024    RBCUR negative 09/06/2024       General:   RESP: clear to auscultation bilaterally, non labored  CV: regular rate and rhythm, no murmurs, no edema  ABD: gravid, nontender, BS+ FHT present  FHTs: 131-135 bpm; (location)  Fundal height: 36 cm    Initial OB Labs  - Blood Type and Rh: O+  - Antibody Screen: neg  - CBC H/H: 13.4/38.1  - HIV: NR  - RPR: reactive, 2 dils (previously treated)  - GC: neg  - CT: neg  - HBsAg: NR  - HCVAb: NR  - Rubella: immune  - Varicella: immune  - UA & Culture: no growth  - Sickle Cell Screen: neg  - PAP: NIL    15-20 Weeks Lab   - Quad Screen: normal risk     28 Week Lab   - 1H GTT: 125   - Rhogam: not indicated  - Date of Tdap: 7/22/24  - CBC H/H: 12.4/35.4  - RPR: reactive, 2 dils    36 Week Lab  - CBC H/H:   - RPR:   - GBS Culture:   - HIV:   - Urine: GC:     Ultrasound  Initial US  Single viable intrauterine pregnancy at 11 weeks and 5 days with an estimated delivery date of 10/12/2024.     20 wk anatomy " US  A detailed fetal anatomic ultrasound examination was performed for the following high risk indication: BMI. No fetal structural malformations are identified; however, fetal imaging is incomplete today. A follow-up study will be scheduled to complete the fetal anatomic survey. Fetal size today is consistent with established gestational age. Cervical length by TA scanning is normal. Placental location is posterior without evidence of previa.      F/u anatomy survey  A viable stock pregnancy is visualized in cephalic presentation. Estimated fetal weight is at the 57th percentile with an abdominal circumference at the 56th percentile. New right sided renal pelvis dilation is noted. Cardiac arches remain suboptimal. Amniotic fluid volume is normal. Placenta is posterior.     Urine dip:  Lab Results   Component Value Date    COLORU Yellow 09/06/2024    SPECGRAV 1.015 09/06/2024    PHUR 7.0 09/06/2024    WBCUR negative 09/06/2024    NITRITE negative 09/06/2024    PROTEINPOC negative 09/06/2024    GLUCOSEUR negative\ 09/06/2024    KETONESU negative 09/06/2024    UROBILINOGEN 0.2 09/06/2024    BILIRUBINPOC negative 09/06/2024    RBCUR negative 09/06/2024     Assessment/Plan:     April was seen today for follow-up.    Diagnoses and all orders for this visit:    35 weeks gestation of pregnancy  -     POCT urine dipstick without microscope         35 weeks gestation of Pregnancy  -     POCT urine dipstick without microscope reviewed and WNL  -     OB Protocol   -     PNVs  -     Urine dip reviewed as above  -     Routine (initial) labs: as mentioned above/pending  -     Mother plans to breastfeed, will have lactation nurse speak with patient prior to leaving clinic today  -     Postpartum contraception discussion: PENDING, thinking about possible nexplenon  -     ED precautions discussed in depth: vaginal bleeding or leaking fluid, belly cramping or pain, SOB/chest pain, swelling of the face/lower extremities, vision  changes. If don't feel the baby move in over an hour. Severe headache that are not resolved with medication     Syphilis affecting pregnancy in third trimester  - Follows with MFM  - Treated, dils went from 1:128 to 1:2     GERD  - Initiated Prevacid at last visit 8/22, stable at this time  - Avoid triggers, spicy foods, fried foods  - Eat at least 2-3 hours prior to bed time    Return to clinic in Follow up in about 1 week (around 9/13/2024) for Routine OB.    Kan Turner MD  [facility], [facility]

## 2024-09-16 ENCOUNTER — OFFICE VISIT (OUTPATIENT)
Dept: FAMILY MEDICINE | Facility: CLINIC | Age: 24
End: 2024-09-16
Payer: MEDICAID

## 2024-09-16 VITALS
WEIGHT: 272 LBS | TEMPERATURE: 98 F | RESPIRATION RATE: 18 BRPM | SYSTOLIC BLOOD PRESSURE: 126 MMHG | HEART RATE: 94 BPM | DIASTOLIC BLOOD PRESSURE: 81 MMHG | OXYGEN SATURATION: 98 % | BODY MASS INDEX: 42.69 KG/M2 | HEIGHT: 67 IN

## 2024-09-16 DIAGNOSIS — Z3A.36 36 WEEKS GESTATION OF PREGNANCY: Primary | ICD-10-CM

## 2024-09-16 LAB
BILIRUB SERPL-MCNC: NEGATIVE MG/DL
BLOOD URINE, POC: NEGATIVE
C TRACH DNA SPEC QL NAA+PROBE: NOT DETECTED
CLARITY, POC UA: CLEAR
COLOR, POC UA: YELLOW
GLUCOSE UR QL STRIP: NEGATIVE
KETONES UR QL STRIP: NEGATIVE
LEUKOCYTE ESTERASE URINE, POC: NEGATIVE
N GONORRHOEA DNA SPEC QL NAA+PROBE: NOT DETECTED
NITRITE, POC UA: NEGATIVE
PH, POC UA: 7
PRENATAL STREP B CULTURE: NEGATIVE
PROTEIN, POC: NEGATIVE
SOURCE (OHS): NORMAL
SPECIFIC GRAVITY, POC UA: 1.01
UROBILINOGEN, POC UA: 0.2

## 2024-09-16 PROCEDURE — 87491 CHLMYD TRACH DNA AMP PROBE: CPT

## 2024-09-16 PROCEDURE — 81002 URINALYSIS NONAUTO W/O SCOPE: CPT | Mod: PBBFAC

## 2024-09-16 PROCEDURE — 87591 N.GONORRHOEAE DNA AMP PROB: CPT

## 2024-09-16 PROCEDURE — 99213 OFFICE O/P EST LOW 20 MIN: CPT | Mod: PBBFAC

## 2024-09-16 PROCEDURE — 87081 CULTURE SCREEN ONLY: CPT

## 2024-09-16 NOTE — PROGRESS NOTES
OB Office Visit Note    Name: Aleksandra Smith  MRN: [id number]  Date: 2024    Subjective:      Chief Complaint: Routine Prenatal Visit (36 week follow up no complaints.)    Aleksandra Smith is a 24 y.o.  at 36w1d with CAROLA 10/13/2024, by 1st trimester Ultrasound    Current issues: has no unusual complaints. Was having some left inguinal pain that started recently and is mild and sharp in nature. Was not sure if pain is related to contractions. Explained to patient that round ligament pain is normal in pregnancy.    Chronic Conditions  - Depression: mood stable, no pharmacologic management at this time, denies SI/HI  - Hx of syphilis: initially diagnosed 2022, treated with doxycycline due to penicillin allergy at urgent care; initial improvement of dils from 1:128 on 2022 to 1:16 on 10/26/2022 after treatment of doxycyline with further improvement from 16 to 2 on 2024 likely due to patient being on chronic doxycycline for hidradenitis at the end of   - GERD: relief with Tums    Meds:   Prior to Admission medications    Medication Sig Start Date End Date Taking? Authorizing Provider   lansoprazole (PREVACID) 30 MG capsule Take 1 capsule (30 mg total) by mouth once daily. 24  Jamal Grullon MD   PNV,calcium 72-iron-folic acid (PRENATAL VITAMIN PLUS LOW IRON) 27 mg iron- 1 mg Tab Take 1 tablet (1 each total) by mouth once daily. 3/28/24 3/28/25  Tamiko Mckee MD     Allergies:   Review of patient's allergies indicates:   Allergen Reactions    Penicillins      Unknown: Was told by mother that she was allergic       Gestational History:   OB History    Para Term  AB Living   1 0 0 0 0 0   SAB IAB Ectopic Multiple Live Births   0 0 0 0 0      # Outcome Date GA Lbr Carter/2nd Weight Sex Type Anes PTL Lv   1 Current              Antepartum specific ROS  - Fetal movements: Yes   - Vaginal bleeding: No  - Vaginal discharge: No  - Loss of fluid: No  - Contractions: No  - Headaches:  "no  - Vision changes: No  - Edema: No    Review of Systems  Constitutional: no fever, no chills  CV: no chest pain  RESP: no SOB  : no dysuria, no hematuria  GI: no constipation, no diarrhea, no nausea, no vomiting  Psych: no depression, no anxiety; No SI/HI    Objective:      Vitals:    09/16/24 1553   BP: 126/81   BP Location: Right arm   Patient Position: Sitting   BP Method: Large (Automatic)   Pulse: 94   Resp: 18   Temp: 98.2 °F (36.8 °C)   TempSrc: Oral   SpO2: 98%   Weight: 123.4 kg (272 lb)   Height: 5' 7" (1.702 m)     Lab Results   Component Value Date    COLORU Yellow 09/16/2024    SPECGRAV 1.015 09/16/2024    PHUR 7.0 09/16/2024    WBCUR Negative 09/16/2024    NITRITE Negative 09/16/2024    PROTEINPOC Negative 09/16/2024    GLUCOSEUR Negative 09/16/2024    KETONESU Negative 09/16/2024    UROBILINOGEN 0.2 09/16/2024    BILIRUBINPOC Negative 09/16/2024    RBCUR Negative 09/16/2024       General: well-groomed/dressed; no acute distress  RESP: clear to auscultation bilaterally, non labored  CV: regular rate and rhythm, no murmurs, no edema  ABD: gravid, nontender, BS+ FHT present  FHTs: 140 bpm  Fundal height: 37 cm    Cervical: closed, posterior, soft, - 3 fetal station  External genitalia: Normal female genitalia without lesion, discharge or tenderness.   Speculum Exam: Vaginal vault without discharge, nonodorous, no lesions/masses seen.  Cervical os visualized as closed, no lesions/masses.   Note: Female RN chaperone present for entirety of genital exam.    Initial OB Labs ordered on 03/28/24  - Blood Type and Rh: O+  - Antibody Screen: neg  - CBC H/H: 13.4/38.1  - HIV: NR  - RPR: reactive, 2 dils (previously treated)  - GC: neg  - CT: neg  - HBsAg: NR  - HCVAb: NR  - Rubella: immune  - Varicella: immune  - UA & Culture: no growth  - Sickle Cell Screen: neg  - PAP: NIL    15-20 Weeks Lab: ordered on 04/29/24  - Quad Screen: normal risk     28 Week Lab ordered on 07/22/24  - 1H GTT: 125   - Rhogam: not " indicated  - Date of Tdap: 7/22/24  - CBC H/H: 12.4/35.4  - RPR: reactive, 2 dils    36 Week Lab ordered on 09/16/24  - CBC H/H:   - RPR:   - GBS Culture:   - HIV:   - GC:     Ultrasound  Initial US  Single viable intrauterine pregnancy at 11 weeks and 5 days with an estimated delivery date of 10/12/2024.     20 wk anatomy US - 05/28/24  A detailed fetal anatomic ultrasound examination was performed for the following high risk indication: BMI. No fetal structural malformations are identified; however, fetal imaging is incomplete today. A follow-up study will be scheduled to complete the fetal anatomic survey. Fetal size today is consistent with established gestational age. Cervical length by TA scanning is normal. Placental location is posterior without evidence of previa.      F/u anatomy survey 07/22/24  A viable stock pregnancy is visualized in cephalic presentation. Estimated fetal weight is at the 57th percentile with an abdominal circumference at the 56th percentile. New right sided renal pelvis dilation is noted. Cardiac arches remain suboptimal. Amniotic fluid volume is normal. Placenta is posterior.     F/u anatomy survey 08/20/24  A viable stock pregnancy is visualized in breech presentation. Estimated fetal weight is at the 47th percentile with an abdominal circumference at the 72nd percentile.   No fetal abnormalities are noted and pyelectasis is resolved. Aortic arch remains suboptimal secondary to fetal position. Amniotic fluid volume is normal. Placenta is   posterior. Biophysical profile is 8/8.     Urine dip:  Lab Results   Component Value Date    COLORU Yellow 09/16/2024    SPECGRAV 1.015 09/16/2024    PHUR 7.0 09/16/2024    WBCUR Negative 09/16/2024    NITRITE Negative 09/16/2024    PROTEINPOC Negative 09/16/2024    GLUCOSEUR Negative 09/16/2024    KETONESU Negative 09/16/2024    UROBILINOGEN 0.2 09/16/2024    BILIRUBINPOC Negative 09/16/2024    RBCUR Negative 09/16/2024      Assessment/Plan:     April was seen today for routine prenatal visit.    Diagnoses and all orders for this visit:    36 weeks gestation of Pregnancy  -     POCT urine dipstick without microscope reviewed and WNL  -     OB Protocol   -     PNVs  -     Urine dip reviewed as above  -     Routine (initial) labs: as mentioned above  - ordered 36 wga labs above  -     Mother plans to breastfeed  -     Postpartum contraception discussion: PENDING, thinking about possible nexplenon  -     ED precautions discussed in depth: vaginal bleeding or leaking fluid, belly cramping or pain, SOB/chest pain, swelling of the face/lower extremities, vision changes. If don't feel the baby move in over an hour. Severe headache that are not resolved with medication     Syphilis affecting pregnancy in third trimester  - Follows with MFM  - Treated, dils went from 1:128 to 1:2  - repeat RPR ordered today     GERD  - Initiated Prevacid at last visit 8/22; transitioned to TUMs on 09/06/24 and stable/well-controlled  - Avoid triggers, spicy foods, fried foods  - Eat at least 2-3 hours prior to bed time    Pyelectasis (right sided) of fetus on prenatal ultrasoun   Follows with MFM  8/20/24- Mild pyelectasis previously noted is no longer clinically significant. Renal pelvic msmts within normal limits for gestational age   Has f/u with repeat U/S on 09/18/2024; pt aware of appointment time/date/location and plans to attend      Return to clinic in Follow up in 1 week (on 9/23/2024) for routine ob f/u.    Ino Singletary MD  [facility]

## 2024-09-17 ENCOUNTER — LAB VISIT (OUTPATIENT)
Dept: LAB | Facility: HOSPITAL | Age: 24
End: 2024-09-17
Payer: MEDICAID

## 2024-09-17 DIAGNOSIS — O35.EXX0 PYELECTASIS OF FETUS ON PRENATAL ULTRASOUND: Primary | ICD-10-CM

## 2024-09-17 DIAGNOSIS — Z3A.36 36 WEEKS GESTATION OF PREGNANCY: ICD-10-CM

## 2024-09-17 LAB
BASOPHILS # BLD AUTO: 0.01 X10(3)/MCL
BASOPHILS NFR BLD AUTO: 0.2 %
EOSINOPHIL # BLD AUTO: 0.11 X10(3)/MCL (ref 0–0.9)
EOSINOPHIL NFR BLD AUTO: 1.8 %
ERYTHROCYTE [DISTWIDTH] IN BLOOD BY AUTOMATED COUNT: 12.8 % (ref 11.5–17)
HCT VFR BLD AUTO: 32 % (ref 37–47)
HGB BLD-MCNC: 10.9 G/DL (ref 12–16)
HIV 1+2 AB+HIV1 P24 AG SERPL QL IA: NONREACTIVE
IMM GRANULOCYTES # BLD AUTO: 0.03 X10(3)/MCL (ref 0–0.04)
IMM GRANULOCYTES NFR BLD AUTO: 0.5 %
LYMPHOCYTES # BLD AUTO: 1.44 X10(3)/MCL (ref 0.6–4.6)
LYMPHOCYTES NFR BLD AUTO: 24 %
MCH RBC QN AUTO: 28.2 PG (ref 27–31)
MCHC RBC AUTO-ENTMCNC: 34.1 G/DL (ref 33–36)
MCV RBC AUTO: 82.9 FL (ref 80–94)
MONOCYTES # BLD AUTO: 0.49 X10(3)/MCL (ref 0.1–1.3)
MONOCYTES NFR BLD AUTO: 8.2 %
NEUTROPHILS # BLD AUTO: 3.93 X10(3)/MCL (ref 2.1–9.2)
NEUTROPHILS NFR BLD AUTO: 65.3 %
NRBC BLD AUTO-RTO: 0 %
PLATELET # BLD AUTO: 166 X10(3)/MCL (ref 130–400)
PMV BLD AUTO: 10.8 FL (ref 7.4–10.4)
RBC # BLD AUTO: 3.86 X10(6)/MCL (ref 4.2–5.4)
RPR SER QL: REACTIVE
RPR SER-TITR: ABNORMAL {TITER}
T PALLIDUM AB SER QL: REACTIVE
WBC # BLD AUTO: 6.01 X10(3)/MCL (ref 4.5–11.5)

## 2024-09-17 PROCEDURE — 86780 TREPONEMA PALLIDUM: CPT

## 2024-09-17 PROCEDURE — 86592 SYPHILIS TEST NON-TREP QUAL: CPT

## 2024-09-17 PROCEDURE — 36415 COLL VENOUS BLD VENIPUNCTURE: CPT

## 2024-09-17 PROCEDURE — 87389 HIV-1 AG W/HIV-1&-2 AB AG IA: CPT

## 2024-09-17 PROCEDURE — 85025 COMPLETE CBC W/AUTO DIFF WBC: CPT

## 2024-09-17 NOTE — PROGRESS NOTES
Faculty Attestation    I have reveiwed and agree with the resident's findings, including all diagnostic interpretations and plans as written.    Tracy Mcdonough MD

## 2024-09-18 ENCOUNTER — OFFICE VISIT (OUTPATIENT)
Dept: MATERNAL FETAL MEDICINE | Facility: CLINIC | Age: 24
End: 2024-09-18
Payer: MEDICAID

## 2024-09-18 ENCOUNTER — HOSPITAL ENCOUNTER (OUTPATIENT)
Facility: HOSPITAL | Age: 24
Discharge: HOME | End: 2024-09-18
Attending: OBSTETRICS & GYNECOLOGY | Admitting: OBSTETRICS & GYNECOLOGY
Payer: MEDICAID

## 2024-09-18 ENCOUNTER — PROCEDURE VISIT (OUTPATIENT)
Dept: MATERNAL FETAL MEDICINE | Facility: CLINIC | Age: 24
End: 2024-09-18
Payer: MEDICAID

## 2024-09-18 VITALS
WEIGHT: 271 LBS | BODY MASS INDEX: 42.53 KG/M2 | SYSTOLIC BLOOD PRESSURE: 132 MMHG | DIASTOLIC BLOOD PRESSURE: 75 MMHG | HEART RATE: 87 BPM | OXYGEN SATURATION: 100 % | HEIGHT: 67 IN | TEMPERATURE: 99 F

## 2024-09-18 VITALS
DIASTOLIC BLOOD PRESSURE: 89 MMHG | HEIGHT: 67 IN | WEIGHT: 270.5 LBS | HEART RATE: 96 BPM | BODY MASS INDEX: 42.46 KG/M2 | SYSTOLIC BLOOD PRESSURE: 136 MMHG

## 2024-09-18 DIAGNOSIS — F32.A DEPRESSION AFFECTING PREGNANCY: ICD-10-CM

## 2024-09-18 DIAGNOSIS — O99.340 DEPRESSION AFFECTING PREGNANCY: ICD-10-CM

## 2024-09-18 DIAGNOSIS — O98.113 SYPHILIS AFFECTING PREGNANCY IN THIRD TRIMESTER: Primary | ICD-10-CM

## 2024-09-18 DIAGNOSIS — O35.EXX0 PYELECTASIS OF FETUS ON PRENATAL ULTRASOUND: ICD-10-CM

## 2024-09-18 DIAGNOSIS — O99.213 SEVERE OBESITY DUE TO EXCESS CALORIES AFFECTING PREGNANCY IN THIRD TRIMESTER: ICD-10-CM

## 2024-09-18 DIAGNOSIS — O98.119 SYPHILIS AFFECTING PREGNANCY: ICD-10-CM

## 2024-09-18 DIAGNOSIS — E66.01 SEVERE OBESITY DUE TO EXCESS CALORIES AFFECTING PREGNANCY IN THIRD TRIMESTER: ICD-10-CM

## 2024-09-18 PROCEDURE — 1159F MED LIST DOCD IN RCRD: CPT | Mod: CPTII,S$GLB,, | Performed by: OBSTETRICS & GYNECOLOGY

## 2024-09-18 PROCEDURE — 1160F RVW MEDS BY RX/DR IN RCRD: CPT | Mod: CPTII,S$GLB,, | Performed by: OBSTETRICS & GYNECOLOGY

## 2024-09-18 PROCEDURE — 3008F BODY MASS INDEX DOCD: CPT | Mod: CPTII,S$GLB,, | Performed by: OBSTETRICS & GYNECOLOGY

## 2024-09-18 PROCEDURE — 99213 OFFICE O/P EST LOW 20 MIN: CPT | Mod: TH,S$GLB,, | Performed by: OBSTETRICS & GYNECOLOGY

## 2024-09-18 PROCEDURE — 59025 FETAL NON-STRESS TEST: CPT

## 2024-09-18 PROCEDURE — 76819 FETAL BIOPHYS PROFIL W/O NST: CPT | Mod: S$GLB,,, | Performed by: OBSTETRICS & GYNECOLOGY

## 2024-09-18 PROCEDURE — 76816 OB US FOLLOW-UP PER FETUS: CPT | Mod: S$GLB,,, | Performed by: OBSTETRICS & GYNECOLOGY

## 2024-09-18 PROCEDURE — 3075F SYST BP GE 130 - 139MM HG: CPT | Mod: CPTII,S$GLB,, | Performed by: OBSTETRICS & GYNECOLOGY

## 2024-09-18 PROCEDURE — 3079F DIAST BP 80-89 MM HG: CPT | Mod: CPTII,S$GLB,, | Performed by: OBSTETRICS & GYNECOLOGY

## 2024-09-18 NOTE — ASSESSMENT & PLAN NOTE
Syphilis is a sexually and congenitally transmitted disease caused by the spirochete Treponema pallidum. Early infections can be found due to symptoms (such as chancre, skin rash, lymphadenopathy, etc.) or due to recent seroconversion.  -Latent syphilis acquired within the preceding year is referred to as early latent syphilis; all other cases of latent syphilis are late latent syphilis or syphilis of unknown duration. T. pallidum can infect the central nervous system and result in neurosyphilis, which can occur at any stage of syphilis. Early neurologic clinical manifestations (i.e., cranial nerve dysfunction, meningitis, stroke, acute altered mental status, and auditory or ophthalmic abnormalities) are usually present within the first few months or years of infection. Late neurologic manifestations (i.e., tabes dorsalis and general paresis) occur 10-30 years after infection.  -Treatment is with penicillin G. A single dose is used to treat early and early latent disease. Late latent and syphilis of unknown duration is treated with 3 weekly doses of IV Penicillin G 2.4 million units. Treatment may cause the Jarisch-Herxheimer reaction, which is an acute febrile reaction frequently accompanied by headache, myalgia, fever, and other symptoms that can occur within the first 24 hours after the initiation of any therapy for syphilis.The Jarisch-Herxheimer reaction occurs most frequently among persons who have early syphilis, presumably because bacterial burdens are higher during these stages. Antipyretics can be used to manage symptoms, but they have not been proven to prevent this reaction. The Jarisch-Herxheimer reaction might induce early labor or cause fetal distress in pregnant women, but this should not prevent or delay therapy.  -Monitoring treatment response consists of following RPR titers with an expected four-fold decline by 6-12 months post treatment. When this reduction is not seen, there could be  possibility of reinfection, neurosyphilis, or slow response to treatment.  -For people at high risk of reinfection, we recommend RPR titers every 1-2 months. Thus, if her titers rise, there may be concern for reinfection. Persons who have signs or symptoms that persist or recur and those with at least a fourfold increase in nontreponemal test titer persisting for >2 weeks likely experienced treatment failure or were re-infected. These persons should be retreated and reevaluated for HIV infection. Because treatment failure usually cannot be reliably distinguished from reinfection with T. pallidum, a CSF analysis also should be performed; treatment should be guided by CSF findings.  -Partners are recommended to be treated with 3 doses of penicillin G, as her syphilis was of unknown duration.  -Congenital syphilis can cause fetal growth restriction, non-immune hydrops,  birth, and stillbirth. Vertical transmission rates are influenced by stage of disease, timing of infection during pregnancy, and treatment. Vertical transmission is increased in women with early stage disease, infection during the second half of pregnancy, and lack of identification and treatment. Penicillin is curative of congenital infection, especially if treated prior to 30 days before delivery. Congenital infection has been diagnosed in 1 to 2 percent of offspring of women adequately treated during pregnancy compared with 70 to 100 percent of offspring of untreated mothers.     Initially dx'ed in 2022. Treated w/ doxycycline by urgent care at that time due to PCN allergy. Initial titer 1:128. Repeat titer 3 months later demonstrated decrease in titer to 1:16. She reports her partner at that time is the current father of the baby. She states he was treated at that time, as well.  Most recent titer during pregnancy (3/28/24)- 1:2  24- RPR titer 1:2  24- RPR titer 1:2  24- RPR titer 1:2    Recommendations:  Detailed  ultrasound via maternal-fetal medicine to assess for signs of congenital infection  Recommend serial monitoring every 1-2 months if she is at risk for reinfection  However, if four-fold increase in RPR titers, she should be re-treated and sent to infectious disease for follow-up  Assess pelvis for any lesions prior to delivery, as active lesions (chancre, condyloma) can cause  infection  Send placenta to pathology for evaluation   Notification of pediatrics at the time of delivery and close  followup   Partner treatment done, per patient report  Breastfeeding may be done

## 2024-09-18 NOTE — ASSESSMENT & PLAN NOTE
Please see prior documentation for specific counseling.      BMI 42.6    Recommendations:  TWG goal is 11-20 lbs  Screen for signs/symptoms of obstructive sleep apnea  Nutritionist consult offered (this is to be ordered by primary OB provider)  Consider early 1 hr GCT (not completed); routine at 24 weeks gestation  Consider low dose aspirin 81 mg daily for preeclampsia risk reduction  Targeted anatomical survey scheduled at 18-20 weeks (completed)  Fetal growth ultrasound at 32 weeks if BMI >= 40  Lovenox 40 mg BID for VTE prophylaxis while admitted to the hospital (antepartum or postpartum) if BMI >= 40.   Encouraged breastfeeding  Postpartum lifestyle modifications & weight loss

## 2024-09-18 NOTE — DISCHARGE INSTRUCTIONS
Return to hospital with decreased fetal movement, vaginal bleeding, leaking of fluid, or abdominal pain every 3-5 minutes consistent for 2 hours.      Keep scheduled appointment with primary OB and MFM.

## 2024-09-18 NOTE — PROGRESS NOTES
"Maternal Fetal Medicine follow up consult    SUBJECTIVE:     April Luis is a 24 y.o.  female with IUP at 36w3d who is seen in follow up consultation by MFM.  Pregnancy complications include:   Problem   - Pyelectasis (right sided) of fetus on prenatal ultrasound   - Syphilis affecting pregnancy in third trimester   -BMI affecting pregnancy in third trimester   - Depression affecting pregnancy     April presents for routine follow up appointment.  Recent RPR titer stable (drawn yesterday).  Denies LOF, VB, contractions. Positive fetal movement.    Previous notes reviewed.   No changes to medical, surgical, family, social, or obstetric history.  Interval history since last MFM visit: see above  Medications reviewed.    Care team members:  [facility] - Primary OB     OBJECTIVE:   /89 (BP Location: Right arm)   Pulse 96   Ht 5' 7" (1.702 m)   Wt 122.7 kg (270 lb 8.1 oz)   LMP  (LMP Unknown)   BMI 42.37 kg/m²     Ultrasound performed. See viewpoint for full ultrasound report.    A viable stock pregnancy is visualized in cephalic presentation.  Estimated fetal weight is at the 80th percentile with an abdominal circumference at the 97th percentile ( about 2 weeks ahead).    No fetal abnormalities are noted and previous anatomic survey was normal. Amniotic fluid volume is normal.  Placenta is posterior/fundal. Biophysical profile is 6/8 (no fetal breathing).     ASSESSMENT/PLAN:     24 y.o.  female with IUP at 36w3d    - Syphilis affecting pregnancy in third trimester  Syphilis is a sexually and congenitally transmitted disease caused by the spirochete Treponema pallidum. Early infections can be found due to symptoms (such as chancre, skin rash, lymphadenopathy, etc.) or due to recent seroconversion.  -Latent syphilis acquired within the preceding year is referred to as early latent syphilis; all other cases of latent syphilis are late latent syphilis or syphilis of unknown duration. T. pallidum can " infect the central nervous system and result in neurosyphilis, which can occur at any stage of syphilis. Early neurologic clinical manifestations (i.e., cranial nerve dysfunction, meningitis, stroke, acute altered mental status, and auditory or ophthalmic abnormalities) are usually present within the first few months or years of infection. Late neurologic manifestations (i.e., tabes dorsalis and general paresis) occur 10-30 years after infection.  -Treatment is with penicillin G. A single dose is used to treat early and early latent disease. Late latent and syphilis of unknown duration is treated with 3 weekly doses of IV Penicillin G 2.4 million units. Treatment may cause the Jarisch-Herxheimer reaction, which is an acute febrile reaction frequently accompanied by headache, myalgia, fever, and other symptoms that can occur within the first 24 hours after the initiation of any therapy for syphilis.The Jarisch-Herxheimer reaction occurs most frequently among persons who have early syphilis, presumably because bacterial burdens are higher during these stages. Antipyretics can be used to manage symptoms, but they have not been proven to prevent this reaction. The Jarisch-Herxheimer reaction might induce early labor or cause fetal distress in pregnant women, but this should not prevent or delay therapy.  -Monitoring treatment response consists of following RPR titers with an expected four-fold decline by 6-12 months post treatment. When this reduction is not seen, there could be possibility of reinfection, neurosyphilis, or slow response to treatment.  -For people at high risk of reinfection, we recommend RPR titers every 1-2 months. Thus, if her titers rise, there may be concern for reinfection. Persons who have signs or symptoms that persist or recur and those with at least a fourfold increase in nontreponemal test titer persisting for >2 weeks likely experienced treatment failure or were re-infected. These persons  should be retreated and reevaluated for HIV infection. Because treatment failure usually cannot be reliably distinguished from reinfection with T. pallidum, a CSF analysis also should be performed; treatment should be guided by CSF findings.  -Partners are recommended to be treated with 3 doses of penicillin G, as her syphilis was of unknown duration.  -Congenital syphilis can cause fetal growth restriction, non-immune hydrops,  birth, and stillbirth. Vertical transmission rates are influenced by stage of disease, timing of infection during pregnancy, and treatment. Vertical transmission is increased in women with early stage disease, infection during the second half of pregnancy, and lack of identification and treatment. Penicillin is curative of congenital infection, especially if treated prior to 30 days before delivery. Congenital infection has been diagnosed in 1 to 2 percent of offspring of women adequately treated during pregnancy compared with 70 to 100 percent of offspring of untreated mothers.     Initially dx'ed in 2022. Treated w/ doxycycline by urgent care at that time due to PCN allergy. Initial titer 1:128. Repeat titer 3 months later demonstrated decrease in titer to 1:16. She reports her partner at that time is the current father of the baby. She states he was treated at that time, as well.  Most recent titer during pregnancy (3/28/24)- 1:2  24- RPR titer 1:2  24- RPR titer 1:2  24- RPR titer 1:2    Recommendations:  Detailed ultrasound via maternal-fetal medicine to assess for signs of congenital infection  Recommend serial monitoring every 1-2 months if she is at risk for reinfection  However, if four-fold increase in RPR titers, she should be re-treated and sent to infectious disease for follow-up  Assess pelvis for any lesions prior to delivery, as active lesions (chancre, condyloma) can cause  infection  Send placenta to pathology for evaluation   Notification  of pediatrics at the time of delivery and close  followup   Partner treatment done, per patient report  Breastfeeding may be done        - Depression affecting pregnancy  She reports a history of depression. She was taking Fluoxetine prior to pregnancy, but discontinued at +UPT.  She reports stable symptoms without medication. Discussed increased risk for peripartum and postpartum mood disorders. Precautions provided.      It is important to optimize mental health conditions during pregnancy in an effort to reduce the risk of postpartum mood disorders. There are options for management including psychotherapy, counseling, cognitive behavioral therapy, exercise/yoga, journaling, and psychiatry services.       -BMI affecting pregnancy in third trimester  Please see prior documentation for specific counseling.      BMI 42.6    Recommendations:  TWG goal is 11-20 lbs  Screen for signs/symptoms of obstructive sleep apnea  Nutritionist consult offered (this is to be ordered by primary OB provider)  Consider early 1 hr GCT (not completed); routine at 24 weeks gestation  Consider low dose aspirin 81 mg daily for preeclampsia risk reduction  Targeted anatomical survey scheduled at 18-20 weeks (completed)  Fetal growth ultrasound at 32 weeks if BMI >= 40  Lovenox 40 mg BID for VTE prophylaxis while admitted to the hospital (antepartum or postpartum) if BMI >= 40.   Encouraged breastfeeding  Postpartum lifestyle modifications & weight loss      - Pyelectasis (right sided) of fetus on prenatal ultrasound  Mild right sided renal pelvic dilation measuring 7.6mm was noted consistent with UTDA1. The kidneys appear normal as does the bladder. She has a low risk quad screen and this is a female fetus.     The finding of mild renal pelvis dilation was discussed with the patient. We reviewed basic anatomy of the renal collecting system and then discussed possible etiologies for renal pelvis dilation. When dilation of the renal  pelvis is borderline or mild, most cases will resolve spontaneously. However, if the renal dilation persists in the  period, the most common conditions that can cause renal pelvis dilation are UPJ obstruction (ureteropelvic junction), UVJ obstruction (ureterovesical junction) and VUR (vesicoureteral reflux). These conditions predispose infants/children to urinary tract infection, but can be effectively followed and treated. I explained that the vast majority of infants with these findings will have spontaneous resolution during pregnancy. We will plan to re-evaluate the kidneys at her next office visit.     24- Mild pyelectasis previously noted is no longer clinically significant. Renal pelvic msmts within normal limits for gestational age.     BPP  today. To L&D for NST.     We have not scheduled any follow-up with M at this time, but would be happy to see her again should any questions, concerns, or issues arise.        Claudia Rivera MD  Maternal Fetal Medicine

## 2024-09-19 LAB — BACTERIA SPEC CULT: NORMAL

## 2024-09-23 ENCOUNTER — OFFICE VISIT (OUTPATIENT)
Dept: FAMILY MEDICINE | Facility: CLINIC | Age: 24
End: 2024-09-23
Payer: MEDICAID

## 2024-09-23 VITALS
SYSTOLIC BLOOD PRESSURE: 114 MMHG | DIASTOLIC BLOOD PRESSURE: 76 MMHG | WEIGHT: 275 LBS | HEART RATE: 101 BPM | OXYGEN SATURATION: 99 % | HEIGHT: 67 IN | TEMPERATURE: 98 F | BODY MASS INDEX: 43.16 KG/M2

## 2024-09-23 DIAGNOSIS — Z3A.37 37 WEEKS GESTATION OF PREGNANCY: Primary | ICD-10-CM

## 2024-09-23 LAB
BILIRUB SERPL-MCNC: NEGATIVE MG/DL
BLOOD URINE, POC: NORMAL
CLARITY, POC UA: NORMAL
COLOR, POC UA: YELLOW
GLUCOSE UR QL STRIP: NEGATIVE
KETONES UR QL STRIP: NEGATIVE
LEUKOCYTE ESTERASE URINE, POC: NEGATIVE
NITRITE, POC UA: NEGATIVE
PH, POC UA: 6.5
PROTEIN, POC: NEGATIVE
SPECIFIC GRAVITY, POC UA: 1.01
UROBILINOGEN, POC UA: 0.2

## 2024-09-23 PROCEDURE — 99213 OFFICE O/P EST LOW 20 MIN: CPT | Mod: PBBFAC

## 2024-09-23 PROCEDURE — 81002 URINALYSIS NONAUTO W/O SCOPE: CPT | Mod: PBBFAC

## 2024-09-23 NOTE — PROGRESS NOTES
OB Office Visit Note    Name: Aleksandra Smith  MRN: [id number]  Date: 2024    Subjective:      Chief Complaint: Routine Prenatal Visit (OB 37^1)    Aleksandra Smith is a 24 y.o.  at 36w1d with CAROLA 10/13/2024, by 1st trimester Ultrasound    Current issues: none  - was recently seen in [facility]'s office on 24 for f/u and was discharged from [facility] but was subsequently sent to ED for NST due to BPP   - she was discharged from the OB ED after reassuring NST    Chronic Conditions  - Depression: mood stable, no pharmacologic management at this time, denies SI/HI  - Hx of syphilis: initially diagnosed 2022, treated with doxycycline due to penicillin allergy at urgent care; initial improvement of dils from 1:128 on 2022 to 1:16 on 10/26/2022 after treatment of doxycyline with further improvement from 16 to 2 on 2024 likely due to patient being on chronic doxycycline for hidradenitis at the end of   - GERD: relief with Tums    Meds:   Prior to Admission medications    Medication Sig Start Date End Date Taking? Authorizing Provider   lansoprazole (PREVACID) 30 MG capsule Take 1 capsule (30 mg total) by mouth once daily. 24  Jamal Grullon MD   PNV,calcium 72-iron-folic acid (PRENATAL VITAMIN PLUS LOW IRON) 27 mg iron- 1 mg Tab Take 1 tablet (1 each total) by mouth once daily. 3/28/24 3/28/25  Tamiko Mckee MD     Allergies:   Review of patient's allergies indicates:   Allergen Reactions    Penicillins      Unknown: Was told by mother that she was allergic       Gestational History:   OB History    Para Term  AB Living   1 0 0 0 0 0   SAB IAB Ectopic Multiple Live Births   0 0 0 0 0      # Outcome Date GA Lbr Carter/2nd Weight Sex Type Anes PTL Lv   1 Current              Antepartum specific ROS  - Fetal movements: Yes   - Vaginal bleeding: No  - Vaginal discharge: No  - Loss of fluid: No  - Contractions: No  - Headaches: no  - Vision changes: No  - Edema: No    Review of  "Systems  Constitutional: no fever, no chills  CV: no chest pain  RESP: no SOB  : no dysuria, no hematuria  GI: no constipation, no diarrhea, no nausea, no vomiting  Psych: no depression, no anxiety; No SI/HI    Objective:      Vitals:    09/23/24 1557   BP: 114/76   BP Location: Left arm   Patient Position: Sitting   BP Method: Large (Automatic)   Pulse: 101   Temp: 98.1 °F (36.7 °C)   TempSrc: Oral   SpO2: 99%   Weight: 124.7 kg (275 lb)   Height: 5' 7" (1.702 m)       Lab Results   Component Value Date    COLORU Yellow 09/16/2024    SPECGRAV 1.015 09/16/2024    PHUR 7.0 09/16/2024    WBCUR Negative 09/16/2024    NITRITE Negative 09/16/2024    PROTEINPOC Negative 09/16/2024    GLUCOSEUR Negative 09/16/2024    KETONESU Negative 09/16/2024    UROBILINOGEN 0.2 09/16/2024    BILIRUBINPOC Negative 09/16/2024    RBCUR Negative 09/16/2024     General: well-groomed/dressed; no acute distress  RESP: clear to auscultation bilaterally, non labored  CV: regular rate and rhythm, no murmurs, no edema  ABD: gravid, nontender, BS+ FHT present  FHTs: 130-140 bpm  Fundal height: 37 cm    Cervical: closed, posterior, soft, - 3 fetal station  External genitalia: Normal female genitalia without lesion, discharge or tenderness.   Speculum Exam: Vaginal vault without discharge, nonodorous, no lesions/masses seen.  Cervical os visualized as closed, no lesions/masses.   Note: Female RN chaperone present for entirety of genital exam.    Initial OB Labs ordered on 03/28/24  - Blood Type and Rh: O+  - Antibody Screen: neg  - CBC H/H: 13.4/38.1  - HIV: NR  - RPR: reactive, 2 dils (previously treated)  - GC: neg  - CT: neg  - HBsAg: NR  - HCVAb: NR  - Rubella: immune  - Varicella: immune  - UA & Culture: no growth  - Sickle Cell Screen: neg  - PAP: NIL    15-20 Weeks Lab: ordered on 04/29/24  - Quad Screen: normal risk     28 Week Lab ordered on 07/22/24  - 1H GTT: 125   - Rhogam: not indicated  - Date of Tdap: 7/22/24  - CBC H/H: " 12.4/35.4  - RPR: reactive, 2 dils    36 Week Lab ordered on 09/16/24  - CBC H/H: 10.9/32  - RPR: 1:2   - GBS Culture: no growth  - HIV: NR  - GC: negative    Ultrasound  Initial US  Single viable intrauterine pregnancy at 11 weeks and 5 days with an estimated delivery date of 10/12/2024.     20 wk anatomy US - 05/28/24  A detailed fetal anatomic ultrasound examination was performed for the following high risk indication: BMI. No fetal structural malformations are identified; however, fetal imaging is incomplete today. A follow-up study will be scheduled to complete the fetal anatomic survey. Fetal size today is consistent with established gestational age. Cervical length by TA scanning is normal. Placental location is posterior without evidence of previa.      F/u anatomy survey 07/22/24  A viable stock pregnancy is visualized in cephalic presentation. Estimated fetal weight is at the 57th percentile with an abdominal circumference at the 56th percentile. New right sided renal pelvis dilation is noted. Cardiac arches remain suboptimal. Amniotic fluid volume is normal. Placenta is posterior.     F/u anatomy survey 08/20/24  A viable stock pregnancy is visualized in breech presentation. Estimated fetal weight is at the 47th percentile with an abdominal circumference at the 72nd percentile.   No fetal abnormalities are noted and pyelectasis is resolved. Aortic arch remains suboptimal secondary to fetal position. Amniotic fluid volume is normal. Placenta is   posterior. Biophysical profile is 8/8.     F/u anatomy survey 09/17/24  A viable stock pregnancy is visualized in cephalic presentation. Estimated fetal weight is at the 80th percentile with an abdominal circumference at the 97th percentile (   about 2 weeks ahead). No fetal abnormalities are noted and previous anatomic survey was normal. Amniotic fluid volume is normal. Placenta is posterior/fundal. Biophysical   profile is 6/8 (no fetal  breathing).    Urine dip:  Lab Results   Component Value Date    COLORU Yellow 09/16/2024    SPECGRAV 1.015 09/16/2024    PHUR 7.0 09/16/2024    WBCUR Negative 09/16/2024    NITRITE Negative 09/16/2024    PROTEINPOC Negative 09/16/2024    GLUCOSEUR Negative 09/16/2024    KETONESU Negative 09/16/2024    UROBILINOGEN 0.2 09/16/2024    BILIRUBINPOC Negative 09/16/2024    RBCUR Negative 09/16/2024     Assessment/Plan:     April was seen today for routine prenatal visit.    Diagnoses and all orders for this visit:    37 weeks gestation of Pregnancy  -     POCT urine dipstick without microscope reviewed and WNL  -     OB Protocol   -     PNVs  -     Urine dip reviewed as above  -     Routine (initial) labs: as mentioned above  -     Mother plans to breastfeed  -     Postpartum contraception discussion: Nexplanon  -     ED precautions discussed in depth: vaginal bleeding or leaking fluid, belly cramping or pain, SOB/chest pain, swelling of the face/lower extremities, vision changes. If don't feel the baby move in over an hour. Severe headache that are not resolved with medication     Syphilis affecting pregnancy in third trimester  - used to follow with [facility] and was discharged from [facility] (last seen on 09/18/24)  - Treated, dils went from 1:128 to 1:2  - 06/24/24- RPR titer 1:2  - 07/22/24- RPR titer 1:2  - 09/18/24- RPR titer 1:2     GERD  - Initiated Prevacid on 8/22/24; transitioned to TUMs on 09/06/24 and stable/well-controlled  - Avoid triggers, spicy foods, fried foods  - Eat at least 2-3 hours prior to bed time      Return to clinic in Follow up in 1 week (on 9/30/2024) for routine ob f/u.    Ino Singletary MD  [facility], [place]

## 2024-09-24 ENCOUNTER — HOSPITAL ENCOUNTER (EMERGENCY)
Facility: HOSPITAL | Age: 24
Discharge: HOME | End: 2024-09-24
Attending: OBSTETRICS & GYNECOLOGY
Payer: MEDICAID

## 2024-09-24 VITALS
RESPIRATION RATE: 18 BRPM | DIASTOLIC BLOOD PRESSURE: 61 MMHG | HEART RATE: 93 BPM | OXYGEN SATURATION: 100 % | SYSTOLIC BLOOD PRESSURE: 125 MMHG | TEMPERATURE: 99 F

## 2024-09-24 DIAGNOSIS — R11.2: Primary | ICD-10-CM

## 2024-09-24 DIAGNOSIS — K52.9 GASTROENTERITIS: ICD-10-CM

## 2024-09-24 LAB
ALBUMIN SERPL-MCNC: 3.1 G/DL (ref 3.5–5)
ALBUMIN/GLOB SERPL: 0.9 RATIO (ref 1.1–2)
ALP SERPL-CCNC: 106 UNIT/L (ref 40–150)
ALT SERPL-CCNC: 8 UNIT/L (ref 0–55)
ANION GAP SERPL CALC-SCNC: 13 MEQ/L
AST SERPL-CCNC: 17 UNIT/L (ref 5–34)
BASOPHILS # BLD AUTO: 0.01 X10(3)/MCL
BASOPHILS NFR BLD AUTO: 0.1 %
BILIRUB SERPL-MCNC: 1.5 MG/DL
BUN SERPL-MCNC: 8.4 MG/DL (ref 7–18.7)
CALCIUM SERPL-MCNC: 9.1 MG/DL (ref 8.4–10.2)
CHLORIDE SERPL-SCNC: 105 MMOL/L (ref 98–107)
CO2 SERPL-SCNC: 17 MMOL/L (ref 22–29)
CREAT SERPL-MCNC: 0.6 MG/DL (ref 0.55–1.02)
CREAT/UREA NIT SERPL: 14
EOSINOPHIL # BLD AUTO: 0.05 X10(3)/MCL (ref 0–0.9)
EOSINOPHIL NFR BLD AUTO: 0.6 %
ERYTHROCYTE [DISTWIDTH] IN BLOOD BY AUTOMATED COUNT: 13.1 % (ref 11.5–17)
GFR SERPLBLD CREATININE-BSD FMLA CKD-EPI: >60 ML/MIN/1.73/M2
GLOBULIN SER-MCNC: 3.5 GM/DL (ref 2.4–3.5)
GLUCOSE SERPL-MCNC: 82 MG/DL (ref 74–100)
HCT VFR BLD AUTO: 34.9 % (ref 37–47)
HGB BLD-MCNC: 11.6 G/DL (ref 12–16)
IMM GRANULOCYTES # BLD AUTO: 0.03 X10(3)/MCL (ref 0–0.04)
IMM GRANULOCYTES NFR BLD AUTO: 0.3 %
LYMPHOCYTES # BLD AUTO: 0.69 X10(3)/MCL (ref 0.6–4.6)
LYMPHOCYTES NFR BLD AUTO: 7.9 %
MCH RBC QN AUTO: 27.8 PG (ref 27–31)
MCHC RBC AUTO-ENTMCNC: 33.2 G/DL (ref 33–36)
MCV RBC AUTO: 83.7 FL (ref 80–94)
MONOCYTES # BLD AUTO: 0.51 X10(3)/MCL (ref 0.1–1.3)
MONOCYTES NFR BLD AUTO: 5.8 %
NEUTROPHILS # BLD AUTO: 7.47 X10(3)/MCL (ref 2.1–9.2)
NEUTROPHILS NFR BLD AUTO: 85.3 %
NRBC BLD AUTO-RTO: 0 %
PLATELET # BLD AUTO: 165 X10(3)/MCL (ref 130–400)
PMV BLD AUTO: 10.9 FL (ref 7.4–10.4)
POTASSIUM SERPL-SCNC: 4 MMOL/L (ref 3.5–5.1)
PROT SERPL-MCNC: 6.6 GM/DL (ref 6.4–8.3)
RBC # BLD AUTO: 4.17 X10(6)/MCL (ref 4.2–5.4)
SODIUM SERPL-SCNC: 135 MMOL/L (ref 136–145)
WBC # BLD AUTO: 8.76 X10(3)/MCL (ref 4.5–11.5)

## 2024-09-24 PROCEDURE — 99284 EMERGENCY DEPT VISIT MOD MDM: CPT | Mod: 25

## 2024-09-24 PROCEDURE — 63600175 PHARM REV CODE 636 W HCPCS

## 2024-09-24 PROCEDURE — 80053 COMPREHEN METABOLIC PANEL: CPT | Performed by: OBSTETRICS & GYNECOLOGY

## 2024-09-24 PROCEDURE — 96374 THER/PROPH/DIAG INJ IV PUSH: CPT

## 2024-09-24 PROCEDURE — 85025 COMPLETE CBC W/AUTO DIFF WBC: CPT | Performed by: OBSTETRICS & GYNECOLOGY

## 2024-09-24 PROCEDURE — 63600175 PHARM REV CODE 636 W HCPCS: Performed by: OBSTETRICS & GYNECOLOGY

## 2024-09-24 RX ORDER — ONDANSETRON HYDROCHLORIDE 2 MG/ML
8 INJECTION, SOLUTION INTRAVENOUS ONCE
Status: COMPLETED | OUTPATIENT
Start: 2024-09-24 | End: 2024-09-24

## 2024-09-24 RX ORDER — ONDANSETRON 4 MG/1
4 TABLET, ORALLY DISINTEGRATING ORAL
Qty: 10 TABLET | Refills: 0 | Status: SHIPPED | OUTPATIENT
Start: 2024-09-24

## 2024-09-24 RX ORDER — ONDANSETRON HYDROCHLORIDE 2 MG/ML
INJECTION, SOLUTION INTRAVENOUS
Status: COMPLETED
Start: 2024-09-24 | End: 2024-09-24

## 2024-09-24 RX ORDER — SODIUM CHLORIDE, SODIUM LACTATE, POTASSIUM CHLORIDE, CALCIUM CHLORIDE 600; 310; 30; 20 MG/100ML; MG/100ML; MG/100ML; MG/100ML
INJECTION, SOLUTION INTRAVENOUS CONTINUOUS
Status: DISCONTINUED | OUTPATIENT
Start: 2024-09-24 | End: 2024-09-24 | Stop reason: HOSPADM

## 2024-09-24 RX ADMIN — ONDANSETRON 8 MG: 2 INJECTION INTRAMUSCULAR; INTRAVENOUS at 08:09

## 2024-09-24 RX ADMIN — ONDANSETRON HYDROCHLORIDE 8 MG: 2 INJECTION, SOLUTION INTRAVENOUS at 08:09

## 2024-09-24 RX ADMIN — SODIUM CHLORIDE, POTASSIUM CHLORIDE, SODIUM LACTATE AND CALCIUM CHLORIDE 1000 ML: 600; 310; 30; 20 INJECTION, SOLUTION INTRAVENOUS at 07:09

## 2024-09-25 NOTE — ED PROVIDER NOTES
ETHAN NOTE     Admit Date: 2024  ETHAN Physician: Pillo Latif  Primary OBGYN:[facility]    Admit Diagnosis/Chief Complaint: Nausea and Vomiting      Chief Complaint   Patient presents with    Nausea    Vomiting     IUP 37.2 c/o nausea and vomiting, unable to keep food or fluids down. Last episode was shortly before arrival.        Hospital Course:  Aleksandra Smith is a 24 y.o.  at 37w2d presents complaining of nausea and vomiting for the last day.  Seen in the her OBs office yesterday and everything was doing well with normal blood pressure and good fetal movement.  She began to feel nauseated this morning that began throwing up and could not keep anything down including water.  She is feeling weak and decided to present to labor and delivery triage.      Patient states no complications in this pregnancy.     PMHx:   Past Medical History:   Diagnosis Date    Asthma     Childhood Asthma    Mental disorder     depression   History of syphilis which was appropriately treated over a year ago.  RPR titers have been stable throughout the pregnancy.    PSHx:   Past Surgical History:   Procedure Laterality Date    DIAGNOSTIC LAPAROSCOPY N/A 2023    Procedure: LAPAROSCOPY, DIAGNOSTIC;  Surgeon: Arline Valente MD;  Location: [facility];  Service: OB/GYN;  Laterality: N/A;  10 trocar, 10mm lap bag, humi, enseal, methylene blue, doxycycline    EXCISION OF PELVIC MASS  2023    Procedure: EXCISION, MASS, PELVIS;  Surgeon: Arline Valente MD;  Location: [facility];  Service: OB/GYN;;       Patient denies vaginal bleeding, leakage of fluid, contractions, vision changes, RUQ pain, dysuria, and fever.  Fetal Movement: normal.    BP (!) 145/88   Pulse (!) 123   Temp 99.4 °F (37.4 °C)   Resp 18   LMP  (LMP Unknown)   Temp:  [99.4 °F (37.4 °C)] 99.4 °F (37.4 °C)  Pulse:  [123] 123  Resp:  [18] 18  BP: (145)/(88) 145/88    General: in no apparent distress well developed and well nourished  Abdominal: soft,  nontender, nondistended, no abnormal masses, no epigastric pain obese fundus soft, nontender consistent with 37-38 weeks size FHT present  Back: lumbar tenderness absent   CVA tenderness none  Extremeties no redness or tenderness in the calves or thighs no edema DTRs +1 over 4 bilaterally at the knees and no evidence of ankle clonus    SSE:   SVE:  Deferred    FHT:  150's, Reactive, Category 1, and Reassuring for gestational age  TOCO: Contractions irritability    Prenatal labs:  O, Rh positive, Rubella immune, Hepatitis-B surface antigen negative, HIV negative, RPR positive, and Group B strep negative    LABS:   No results found for this or any previous visit (from the past 24 hour(s)).      Imaging Results    None          ASSESMENT: Aleksandra Smith is a 24 y.o.   at 37w2d with non pregnancy related nausea and vomiting in the 3rd trimester.  Most likely viral gastroenteritis  Observation in ETHAN  1000 cc LR given intravenously.  Normalization of patient pulse rate and fetal heart tones.  Patient feeling better and ready for discharge          Discharge Diagnosis/Clinical Impression  :  Non-pregnancy nausea and vomiting (Primary)  Gastroenteritis    Status: Stable and discharged home    Labor precautions reviewed with patient  ER precautions reviewed with patient  Patient was given an opportunity to ask questions  Patient is to follow-up with her primary care physician    Patient Instructions:     Prescription sent to pharmacy on file for Zofran 4 mg ODT number 20.  No refills.  One p.o. Q 6hr p.r.n. nausea vomiting.    - Pt was given routine pregnancy instructions including to return to triage if she had any vaginal bleeding (other than spotting for the next 48hrs), any loss of fluid like her water broke, decreased fetal movement, or contractions Q 5min lasting for 2 or more hours. Pt was also instructed to drink copious water. Patient voiced understanding of all these instructions and was subsequently  discharged home.    She will follow up with her primary OB.      This note was created with the assistance of [employer] voice recognition software. There may be transcription errors as a result of using this technology however minimal. Effort has been made to assure accuracy of transcription but any obvious errors or omissions should be clarified with the author of the document.

## 2024-10-02 ENCOUNTER — OFFICE VISIT (OUTPATIENT)
Dept: FAMILY MEDICINE | Facility: CLINIC | Age: 24
End: 2024-10-02
Payer: MEDICAID

## 2024-10-02 ENCOUNTER — TELEPHONE (OUTPATIENT)
Dept: FAMILY MEDICINE | Facility: CLINIC | Age: 24
End: 2024-10-02

## 2024-10-02 VITALS
RESPIRATION RATE: 20 BRPM | DIASTOLIC BLOOD PRESSURE: 86 MMHG | OXYGEN SATURATION: 98 % | BODY MASS INDEX: 42.22 KG/M2 | WEIGHT: 269 LBS | SYSTOLIC BLOOD PRESSURE: 126 MMHG | HEIGHT: 67 IN | HEART RATE: 95 BPM | TEMPERATURE: 98 F

## 2024-10-02 DIAGNOSIS — Z3A.38 38 WEEKS GESTATION OF PREGNANCY: Primary | ICD-10-CM

## 2024-10-02 LAB
BILIRUB SERPL-MCNC: NORMAL MG/DL
BLOOD URINE, POC: NORMAL
CLARITY, POC UA: NORMAL
COLOR, POC UA: YELLOW
GLUCOSE UR QL STRIP: NORMAL
KETONES UR QL STRIP: NORMAL
LEUKOCYTE ESTERASE URINE, POC: NORMAL
NITRITE, POC UA: NORMAL
PH, POC UA: 7
PROTEIN, POC: NORMAL
SPECIFIC GRAVITY, POC UA: 1.02
UROBILINOGEN, POC UA: 0.2

## 2024-10-02 PROCEDURE — 81002 URINALYSIS NONAUTO W/O SCOPE: CPT | Mod: PBBFAC

## 2024-10-02 PROCEDURE — 90471 IMMUNIZATION ADMIN: CPT | Mod: PBBFAC

## 2024-10-02 PROCEDURE — 99213 OFFICE O/P EST LOW 20 MIN: CPT | Mod: PBBFAC

## 2024-10-02 PROCEDURE — 90656 IIV3 VACC NO PRSV 0.5 ML IM: CPT | Mod: PBBFAC

## 2024-10-02 RX ADMIN — INFLUENZA A VIRUS A/VICTORIA/4897/2022 IVR-238 (H1N1) ANTIGEN (FORMALDEHYDE INACTIVATED), INFLUENZA A VIRUS A/CALIFORNIA/122/2022 SAN-022 (H3N2) ANTIGEN (FORMALDEHYDE INACTIVATED), AND INFLUENZA B VIRUS B/MICHIGAN/01/2021 ANTIGEN (FORMALDEHYDE INACTIVATED) 0.5 ML: 15; 15; 15 INJECTION, SUSPENSION INTRAMUSCULAR at 11:10

## 2024-10-02 NOTE — TELEPHONE ENCOUNTER
10/2/24     LCSW and MSW were consulted to assist this PT in navigating housing resources. Patient and her partner are currently living in a hotel. Since cutting her hours at work, patient is having issues paying the hotel fees this month. The family's last day at the hotel will be Tuesday 10/8/24. Pt has support from her mom and partner. Patient and her partner will be moving to her mothers in [place] after the birth of her baby. She is also able to stay with her mother as long as they need. PT did not want to move to her mothers now as her care has been at this clinic and it would be difficult to transition her care to [place]. Pt requested to be induced on before Tuesday as she will no longer be able to return to the hotel. Once patient delivers, she and her partner will drive to [place] to stay with her mother.  LCSW asked if she needed anymore resources and she metioned needing a breast pump. MSWJulia consulted Lise about getting a Breastpump through patient's insurance. Lise stated that the PT needed to call the number on the back of the insurance card and then the insurance would send Lise a form to complete. MSWJulia contacted the PT and shared this information.     LCSW consulted with Dr. Turner regarding patient's housing concerns and her request to be induced at 39 weeks. LCSW will begin looking for alternative housing options [i.e. short term shelters] in the event patient is not induced and will remain in [place] another week post hotel stay. LCSW will follow up.   ------------------------------------------------  10/3/24    LCSW was notified by Dr. Turner patient was scheduled for an induction this Monday 10/7/24. Dr. Turner will notify patient later today. LCSW will confirm with patient tomorrow and assess for additional unmet needs.   ---------------------------------------------------  10/4/24    LCSW contacted patient to confirm there are no additional housing needs  now that induction has been scheduled before hotel stay expires. Patient confirmed that there are no needs for an emergency shelter now that induction has been scheduled. Patient was encouraged to contact LCSW if further unmet needs arise.

## 2024-10-02 NOTE — PROGRESS NOTES
OB Office Visit Note    Name: Aleksandra Smith  MRN: [id number]  Date: 10/02/2024    Subjective:      Chief Complaint: 38 weeks 3 days gestation of pregnancy      Aleksandra Smith is a 24 y.o.  at 38w3d with CRAOLA 10/13/2024, by Ultrasound    Current issues:  Recent ED evaluation on  for nausea and vomiting likely related to viral gastroenteritis. Reports itching for past few months. Has a hx of exzema on her hand which reports dorsal itching ot her b/l hands. It is mainly located on legs, stomach, and arms.Occurs throughout the day. Occurs 2 to 3 times a week.    Chronic issues:   - Depression: mood stable, no pharmacologic management at this time, denies SI/HI  - Hx of syphilis: initially diagnosed 2022, treated with doxycycline due to penicillin allergy at urgent care; initial improvement of dils from 1:128 on 2022 to 1:16 on 10/26/2022 after treatment of doxycyline with further improvement from 16 to 2 on 2024 likely due to patient being on chronic doxycycline for hidradenitis at the end of   - GERD: relief with Tums    Meds:   Prior to Admission medications    Medication Sig Start Date End Date Taking? Authorizing Provider   ondansetron (ZOFRAN-ODT) 4 MG TbDL Take 1 tablet (4 mg total) by mouth every 24 hours as needed (Nausea and vomiting). 24   Pillo Latif,    PNV,calcium 72-iron-folic acid (PRENATAL VITAMIN PLUS LOW IRON) 27 mg iron- 1 mg Tab Take 1 tablet (1 each total) by mouth once daily. 3/28/24 3/28/25  Tamiko Mckee MD     Allergies:   Review of patient's allergies indicates:   Allergen Reactions    Penicillins      Unknown: Was told by mother that she was allergic       Gestational History:   OB History    Para Term  AB Living   1 0 0 0 0 0   SAB IAB Ectopic Multiple Live Births   0 0 0 0 0      # Outcome Date GA Lbr Carter/2nd Weight Sex Type Anes PTL Lv   1 Current                  Antepartum specific ROS  - Fetal movements: Yes   - Vaginal bleeding:  "No  - Vaginal discharge: No  - Loss of fluid: No  - Contractions: No  - Headaches: No  - Vision changes: No  - Edema: No    Review of Systems  Constitutional: no fever, no chills  CV: no chest pain  RESP: no SOB  : no dysuria, no hematuria  GI: no constipation, no diarrhea, no nausea, no vomiting  Psych: no depression, no anxiety; No SI/HI    Objective:      Vitals:    10/02/24 1029   BP: 126/86   BP Location: Right arm   Patient Position: Sitting   Pulse: 95   Resp: 20   Temp: 97.9 °F (36.6 °C)   TempSrc: Oral   SpO2: 98%   Weight: 122 kg (269 lb)   Height: 5' 7" (1.702 m)       General:   RESP: clear to auscultation bilaterally, non labored  CV: regular rate and rhythm, no murmurs, no edema  ABD: gravid, nontender, BS+ FHT present, baby is head down at this time  FHTs: 140s bpm  Fundal height: 39 cm slightly to right of midline  Cervix: no lesions or cervical motion tenderness and cervix closed    Initial OB Labs ordered on 03/28/24  - Blood Type and Rh: O+  - Antibody Screen: neg  - CBC H/H: 13.4/38.1  - HIV: NR  - RPR: reactive, 2 dils (previously treated)  - GC: neg  - CT: neg  - HBsAg: NR  - HCVAb: NR  - Rubella: immune  - Varicella: immune  - UA & Culture: no growth  - Sickle Cell Screen: neg  - PAP: NIL    15-20 Weeks Lab: ordered on 04/29/24  - Quad Screen: normal risk     28 Week Lab ordered on 07/22/24  - 1H GTT: 125   - Rhogam: not indicated  - Date of Tdap: 7/22/24  - CBC H/H: 12.4/35.4  - RPR: reactive, 2 dils    36 Week Lab ordered on 09/16/24  - CBC H/H: 10.9/32  - RPR: 1:2   - GBS Culture: no growth  - HIV: NR  - GC: negative     Ultrasound  Initial US  Single viable intrauterine pregnancy at 11 weeks and 5 days with an estimated delivery date of 10/12/2024.     20 wk anatomy US - 05/28/24  A detailed fetal anatomic ultrasound examination was performed for the following high risk indication: BMI. No fetal structural malformations are identified; however, fetal imaging is incomplete today. A " follow-up study will be scheduled to complete the fetal anatomic survey. Fetal size today is consistent with established gestational age. Cervical length by TA scanning is normal. Placental location is posterior without evidence of previa.      F/u anatomy survey 07/22/24  A viable stock pregnancy is visualized in cephalic presentation. Estimated fetal weight is at the 57th percentile with an abdominal circumference at the 56th percentile. New right sided renal pelvis dilation is noted. Cardiac arches remain suboptimal. Amniotic fluid volume is normal. Placenta is posterior.      F/u anatomy survey 08/20/24  A viable stock pregnancy is visualized in breech presentation. Estimated fetal weight is at the 47th percentile with an abdominal circumference at the 72nd percentile.   No fetal abnormalities are noted and pyelectasis is resolved. Aortic arch remains suboptimal secondary to fetal position. Amniotic fluid volume is normal. Placenta is   posterior. Biophysical profile is 8/8.      F/u anatomy survey 09/17/24  A viable stock pregnancy is visualized in cephalic presentation. Estimated fetal weight is at the 80th percentile with an abdominal circumference at the 97th percentile (   about 2 weeks ahead). No fetal abnormalities are noted and previous anatomic survey was normal. Amniotic fluid volume is normal. Placenta is posterior/fundal. Biophysical   profile is 6/8 (no fetal breathing).    Urine dip:  Lab Results   Component Value Date    COLORU Yellow 10/02/2024    SPECGRAV 1.020 10/02/2024    PHUR 7.0 10/02/2024    WBCUR neg 10/02/2024    NITRITE neg 10/02/2024    PROTEINPOC 30 mg/dl* 10/02/2024    GLUCOSEUR neg 10/02/2024    KETONESU neg 10/02/2024    UROBILINOGEN 0.2 10/02/2024    BILIRUBINPOC neg 10/02/2024    RBCUR neg 10/02/2024     Assessment/Plan:     April was seen today for 38 weeks 3 days gestation of pregnancy.    Diagnoses and all orders for this visit:    38 weeks gestation of  pregnancy  -     POCT urine dipstick without microscope  -     influenza (Flulaval, Fluzone, Fluarix) 45 mcg/0.5 mL IM vaccine (> or = 6 mo) 0.5 mL         38 weeks gestation of Pregnancy  - Influenza vaccine administered at this time  -     POCT urine dipstick without microscope  -     OB Protocol   -     PNVs  -     Urine dip reviewed as above  -     Routine (initial) labs: as mentioned above/pending  -     Mother plans to breast and/or bottlefeed  -     Postpartum contraception discussion: PENDING  -     ED precautions discussed in depth: vaginal bleeding or leaking fluid, belly cramping or pain, SOB/chest pain, swelling of the face/lower extremities, vision changes. If don't feel the baby move in over an hour. Severe headache that are not resolved with medication     Return to clinic in Follow up in about 1 week (around 10/9/2024) for routine OB.    Kan Turner MD  [facility], -II

## 2024-10-03 ENCOUNTER — TELEPHONE (OUTPATIENT)
Dept: EMERGENCY MEDICINE | Facility: HOSPITAL | Age: 24
End: 2024-10-03
Payer: MEDICAID

## 2024-10-03 NOTE — TELEPHONE ENCOUNTER
Called patient at phone number in chart.  Verified identity via date of birth.  Spoke with patient regarding being able to schedule her elective induction on 10/07/2024 at 2200.  Patient voiced understanding and we will present to [facility] at her scheduled time for an elective induction.  Answered all patient's questions.    Kan Turner MD  [facility] HO-2

## 2024-10-07 ENCOUNTER — HOSPITAL ENCOUNTER (INPATIENT)
Facility: HOSPITAL | Age: 24
LOS: 3 days | Discharge: HOME | End: 2024-10-10
Attending: OBSTETRICS & GYNECOLOGY | Admitting: OBSTETRICS & GYNECOLOGY
Payer: MEDICAID

## 2024-10-07 DIAGNOSIS — Z3A.39 PREGNANCY WITH 39 COMPLETED WEEKS GESTATION: Primary | ICD-10-CM

## 2024-10-07 DIAGNOSIS — Z3A.39 PREGNANCY WITH 39 COMPLETED WEEKS GESTATION: ICD-10-CM

## 2024-10-07 LAB
ERYTHROCYTE [DISTWIDTH] IN BLOOD BY AUTOMATED COUNT: 13.2 % (ref 11.5–17)
GROUP & RH: NORMAL
HCT VFR BLD AUTO: 32.2 % (ref 37–47)
HGB BLD-MCNC: 10.9 G/DL (ref 12–16)
INDIRECT COOMBS: NORMAL
MCH RBC QN AUTO: 27.6 PG (ref 27–31)
MCHC RBC AUTO-ENTMCNC: 33.9 G/DL (ref 33–36)
MCV RBC AUTO: 81.5 FL (ref 80–94)
NRBC BLD AUTO-RTO: 0 %
PLATELET # BLD AUTO: 181 X10(3)/MCL (ref 130–400)
PMV BLD AUTO: 11.7 FL (ref 7.4–10.4)
RBC # BLD AUTO: 3.95 X10(6)/MCL (ref 4.2–5.4)
SPECIMEN OUTDATE: NORMAL
T PALLIDUM AB SER QL: REACTIVE
WBC # BLD AUTO: 8.21 X10(3)/MCL (ref 4.5–11.5)

## 2024-10-07 PROCEDURE — 86901 BLOOD TYPING SEROLOGIC RH(D): CPT

## 2024-10-07 PROCEDURE — 11000001 HC ACUTE MED/SURG PRIVATE ROOM

## 2024-10-07 PROCEDURE — 86592 SYPHILIS TEST NON-TREP QUAL: CPT | Performed by: SPECIALIST

## 2024-10-07 PROCEDURE — 85027 COMPLETE CBC AUTOMATED: CPT

## 2024-10-07 PROCEDURE — 86780 TREPONEMA PALLIDUM: CPT | Performed by: SPECIALIST

## 2024-10-07 PROCEDURE — 63600175 PHARM REV CODE 636 W HCPCS

## 2024-10-07 PROCEDURE — 86850 RBC ANTIBODY SCREEN: CPT

## 2024-10-07 PROCEDURE — 86900 BLOOD TYPING SEROLOGIC ABO: CPT

## 2024-10-07 PROCEDURE — 25000003 PHARM REV CODE 250

## 2024-10-07 RX ORDER — OXYTOCIN-SODIUM CHLORIDE 0.9% IV SOLN 30 UNIT/500ML 30-0.9/5 UT/ML-%
95 SOLUTION INTRAVENOUS CONTINUOUS PRN
Status: DISCONTINUED | OUTPATIENT
Start: 2024-10-07 | End: 2024-10-08

## 2024-10-07 RX ORDER — MISOPROSTOL 100 UG/1
800 TABLET ORAL ONCE AS NEEDED
Status: CANCELLED | OUTPATIENT
Start: 2024-10-07

## 2024-10-07 RX ORDER — SODIUM CHLORIDE 9 MG/ML
INJECTION, SOLUTION INTRAVENOUS
Status: DISCONTINUED | OUTPATIENT
Start: 2024-10-07 | End: 2024-10-08

## 2024-10-07 RX ORDER — CARBOPROST TROMETHAMINE 250 UG/ML
250 INJECTION, SOLUTION INTRAMUSCULAR
Status: CANCELLED | OUTPATIENT
Start: 2024-10-07

## 2024-10-07 RX ORDER — MISOPROSTOL 100 UG/1
800 TABLET ORAL ONCE AS NEEDED
Status: CANCELLED | OUTPATIENT
Start: 2024-10-07 | End: 2036-03-05

## 2024-10-07 RX ORDER — CARBOPROST TROMETHAMINE 250 UG/ML
250 INJECTION, SOLUTION INTRAMUSCULAR
Status: DISCONTINUED | OUTPATIENT
Start: 2024-10-07 | End: 2024-10-08

## 2024-10-07 RX ORDER — OXYTOCIN-SODIUM CHLORIDE 0.9% IV SOLN 30 UNIT/500ML 30-0.9/5 UT/ML-%
10 SOLUTION INTRAVENOUS ONCE AS NEEDED
Status: COMPLETED | OUTPATIENT
Start: 2024-10-07 | End: 2024-10-08

## 2024-10-07 RX ORDER — LIDOCAINE HYDROCHLORIDE 10 MG/ML
10 INJECTION, SOLUTION INFILTRATION; PERINEURAL ONCE AS NEEDED
Status: CANCELLED | OUTPATIENT
Start: 2024-10-07 | End: 2036-03-05

## 2024-10-07 RX ORDER — OXYTOCIN-SODIUM CHLORIDE 0.9% IV SOLN 30 UNIT/500ML 30-0.9/5 UT/ML-%
95 SOLUTION INTRAVENOUS CONTINUOUS PRN
Status: CANCELLED | OUTPATIENT
Start: 2024-10-07

## 2024-10-07 RX ORDER — OXYTOCIN 10 [USP'U]/ML
10 INJECTION, SOLUTION INTRAMUSCULAR; INTRAVENOUS ONCE AS NEEDED
Status: CANCELLED | OUTPATIENT
Start: 2024-10-07 | End: 2036-03-05

## 2024-10-07 RX ORDER — OXYTOCIN 10 [USP'U]/ML
10 INJECTION, SOLUTION INTRAMUSCULAR; INTRAVENOUS ONCE AS NEEDED
Status: DISCONTINUED | OUTPATIENT
Start: 2024-10-07 | End: 2024-10-08

## 2024-10-07 RX ORDER — SODIUM CHLORIDE, SODIUM LACTATE, POTASSIUM CHLORIDE, CALCIUM CHLORIDE 600; 310; 30; 20 MG/100ML; MG/100ML; MG/100ML; MG/100ML
INJECTION, SOLUTION INTRAVENOUS CONTINUOUS
Status: CANCELLED | OUTPATIENT
Start: 2024-10-07

## 2024-10-07 RX ORDER — SODIUM CHLORIDE 9 MG/ML
INJECTION, SOLUTION INTRAVENOUS
Status: CANCELLED | OUTPATIENT
Start: 2024-10-07

## 2024-10-07 RX ORDER — DIPHENOXYLATE HYDROCHLORIDE AND ATROPINE SULFATE 2.5; .025 MG/1; MG/1
2 TABLET ORAL EVERY 6 HOURS PRN
Status: DISCONTINUED | OUTPATIENT
Start: 2024-10-07 | End: 2024-10-08

## 2024-10-07 RX ORDER — LIDOCAINE HYDROCHLORIDE 10 MG/ML
10 INJECTION, SOLUTION INFILTRATION; PERINEURAL ONCE AS NEEDED
Status: DISCONTINUED | OUTPATIENT
Start: 2024-10-07 | End: 2024-10-08

## 2024-10-07 RX ORDER — OXYTOCIN-SODIUM CHLORIDE 0.9% IV SOLN 30 UNIT/500ML 30-0.9/5 UT/ML-%
95 SOLUTION INTRAVENOUS ONCE AS NEEDED
Status: CANCELLED | OUTPATIENT
Start: 2024-10-07 | End: 2036-03-05

## 2024-10-07 RX ORDER — MUPIROCIN 20 MG/G
OINTMENT TOPICAL
Status: DISCONTINUED | OUTPATIENT
Start: 2024-10-07 | End: 2024-10-08

## 2024-10-07 RX ORDER — METHYLERGONOVINE MALEATE 0.2 MG/ML
200 INJECTION INTRAVENOUS ONCE AS NEEDED
Status: CANCELLED | OUTPATIENT
Start: 2024-10-07 | End: 2036-03-05

## 2024-10-07 RX ORDER — MISOPROSTOL 100 UG/1
800 TABLET ORAL ONCE AS NEEDED
Status: DISCONTINUED | OUTPATIENT
Start: 2024-10-07 | End: 2024-10-08

## 2024-10-07 RX ORDER — SIMETHICONE 80 MG
1 TABLET,CHEWABLE ORAL 4 TIMES DAILY PRN
Status: CANCELLED | OUTPATIENT
Start: 2024-10-07

## 2024-10-07 RX ORDER — OXYTOCIN-SODIUM CHLORIDE 0.9% IV SOLN 30 UNIT/500ML 30-0.9/5 UT/ML-%
10 SOLUTION INTRAVENOUS ONCE AS NEEDED
Status: CANCELLED | OUTPATIENT
Start: 2024-10-07 | End: 2036-03-05

## 2024-10-07 RX ORDER — OXYTOCIN-SODIUM CHLORIDE 0.9% IV SOLN 30 UNIT/500ML 30-0.9/5 UT/ML-%
95 SOLUTION INTRAVENOUS ONCE AS NEEDED
Status: COMPLETED | OUTPATIENT
Start: 2024-10-07 | End: 2024-10-08

## 2024-10-07 RX ORDER — DIPHENOXYLATE HYDROCHLORIDE AND ATROPINE SULFATE 2.5; .025 MG/1; MG/1
2 TABLET ORAL EVERY 6 HOURS PRN
Status: CANCELLED | OUTPATIENT
Start: 2024-10-07

## 2024-10-07 RX ORDER — CALCIUM CARBONATE 200(500)MG
500 TABLET,CHEWABLE ORAL 3 TIMES DAILY PRN
Status: CANCELLED | OUTPATIENT
Start: 2024-10-07

## 2024-10-07 RX ORDER — CALCIUM CARBONATE 200(500)MG
500 TABLET,CHEWABLE ORAL 3 TIMES DAILY PRN
Status: DISCONTINUED | OUTPATIENT
Start: 2024-10-07 | End: 2024-10-08

## 2024-10-07 RX ORDER — METHYLERGONOVINE MALEATE 0.2 MG/ML
200 INJECTION INTRAVENOUS ONCE AS NEEDED
Status: DISCONTINUED | OUTPATIENT
Start: 2024-10-07 | End: 2024-10-08

## 2024-10-07 RX ORDER — ONDANSETRON 4 MG/1
8 TABLET, ORALLY DISINTEGRATING ORAL EVERY 8 HOURS PRN
Status: CANCELLED | OUTPATIENT
Start: 2024-10-07

## 2024-10-07 RX ORDER — ONDANSETRON 4 MG/1
8 TABLET, ORALLY DISINTEGRATING ORAL EVERY 8 HOURS PRN
Status: DISCONTINUED | OUTPATIENT
Start: 2024-10-07 | End: 2024-10-08

## 2024-10-07 RX ORDER — SIMETHICONE 80 MG
1 TABLET,CHEWABLE ORAL 4 TIMES DAILY PRN
Status: DISCONTINUED | OUTPATIENT
Start: 2024-10-07 | End: 2024-10-08

## 2024-10-07 RX ORDER — OXYTOCIN-SODIUM CHLORIDE 0.9% IV SOLN 30 UNIT/500ML 30-0.9/5 UT/ML-%
10 SOLUTION INTRAVENOUS ONCE AS NEEDED
Status: DISCONTINUED | OUTPATIENT
Start: 2024-10-07 | End: 2024-10-08

## 2024-10-07 RX ORDER — MUPIROCIN 20 MG/G
OINTMENT TOPICAL
Status: CANCELLED | OUTPATIENT
Start: 2024-10-07

## 2024-10-07 RX ORDER — SODIUM CHLORIDE, SODIUM LACTATE, POTASSIUM CHLORIDE, CALCIUM CHLORIDE 600; 310; 30; 20 MG/100ML; MG/100ML; MG/100ML; MG/100ML
INJECTION, SOLUTION INTRAVENOUS CONTINUOUS
Status: DISCONTINUED | OUTPATIENT
Start: 2024-10-07 | End: 2024-10-08

## 2024-10-07 RX ADMIN — DINOPROSTONE 10 MG: 10 INSERT VAGINAL at 11:10

## 2024-10-07 RX ADMIN — SODIUM CHLORIDE, POTASSIUM CHLORIDE, SODIUM LACTATE AND CALCIUM CHLORIDE: 600; 310; 30; 20 INJECTION, SOLUTION INTRAVENOUS at 10:10

## 2024-10-08 PROBLEM — Z3A.39 PREGNANCY WITH 39 COMPLETED WEEKS GESTATION: Status: ACTIVE | Noted: 2024-10-08

## 2024-10-08 LAB
ABS NEUT (OLG): 5.99 X10(3)/MCL (ref 2.1–9.2)
ALBUMIN SERPL-MCNC: 3 G/DL (ref 3.5–5)
ALBUMIN/GLOB SERPL: 0.8 RATIO (ref 1.1–2)
ALP SERPL-CCNC: 120 UNIT/L (ref 40–150)
ALT SERPL-CCNC: 10 UNIT/L (ref 0–55)
ANION GAP SERPL CALC-SCNC: 12 MEQ/L
AST SERPL-CCNC: 20 UNIT/L (ref 5–34)
BILIRUB SERPL-MCNC: 1 MG/DL
BUN SERPL-MCNC: 7.2 MG/DL (ref 7–18.7)
CALCIUM SERPL-MCNC: 8.6 MG/DL (ref 8.4–10.2)
CHLORIDE SERPL-SCNC: 109 MMOL/L (ref 98–107)
CO2 SERPL-SCNC: 16 MMOL/L (ref 22–29)
CREAT SERPL-MCNC: 0.63 MG/DL (ref 0.55–1.02)
CREAT UR-MCNC: 58.1 MG/DL (ref 45–106)
CREAT/UREA NIT SERPL: 11
EOSINOPHIL NFR BLD MANUAL: 0.08 X10(3)/MCL (ref 0–0.9)
EOSINOPHIL NFR BLD MANUAL: 1 %
GFR SERPLBLD CREATININE-BSD FMLA CKD-EPI: >60 ML/MIN/1.73/M2
GLOBULIN SER-MCNC: 3.6 GM/DL (ref 2.4–3.5)
GLUCOSE SERPL-MCNC: 71 MG/DL (ref 74–100)
INSTRUMENT WBC (OLG): 8.21 X10(3)/MCL
LDH SERPL-CCNC: 232 U/L (ref 125–220)
LYMPHOCYTES NFR BLD MANUAL: 1.97 X10(3)/MCL
LYMPHOCYTES NFR BLD MANUAL: 24 %
MICROCYTES BLD QL SMEAR: ABNORMAL
MONOCYTES NFR BLD MANUAL: 0.25 X10(3)/MCL (ref 0.1–1.3)
MONOCYTES NFR BLD MANUAL: 3 %
NEUTROPHILS NFR BLD MANUAL: 73 %
PLATELET # BLD EST: NORMAL 10*3/UL
POIKILOCYTOSIS BLD QL SMEAR: ABNORMAL
POTASSIUM SERPL-SCNC: 4.2 MMOL/L (ref 3.5–5.1)
PROT SERPL-MCNC: 6.6 GM/DL (ref 6.4–8.3)
PROT UR STRIP-MCNC: 63.4 MG/DL
RBC MORPH BLD: ABNORMAL
RPR SER QL: REACTIVE
RPR SER-TITR: ABNORMAL {TITER}
SODIUM SERPL-SCNC: 137 MMOL/L (ref 136–145)
URATE SERPL-MCNC: 5.5 MG/DL (ref 2.6–6)
URINE PROTEIN/CREATININE RATIO (OLG): 1.1

## 2024-10-08 PROCEDURE — 72200005 HC VAGINAL DELIVERY LEVEL II

## 2024-10-08 PROCEDURE — 80053 COMPREHEN METABOLIC PANEL: CPT

## 2024-10-08 PROCEDURE — 72100003 HC LABOR CARE, EA. ADDL. 8 HRS

## 2024-10-08 PROCEDURE — 63600175 PHARM REV CODE 636 W HCPCS

## 2024-10-08 PROCEDURE — 51701 INSERT BLADDER CATHETER: CPT

## 2024-10-08 PROCEDURE — 25000003 PHARM REV CODE 250

## 2024-10-08 PROCEDURE — 25000003 PHARM REV CODE 250: Performed by: OBSTETRICS & GYNECOLOGY

## 2024-10-08 PROCEDURE — 72100002 HC LABOR CARE, 1ST 8 HOURS

## 2024-10-08 PROCEDURE — 84550 ASSAY OF BLOOD/URIC ACID: CPT

## 2024-10-08 PROCEDURE — 3E0P7VZ INTRODUCTION OF HORMONE INTO FEMALE REPRODUCTIVE, VIA NATURAL OR ARTIFICIAL OPENING: ICD-10-PCS | Performed by: OBSTETRICS & GYNECOLOGY

## 2024-10-08 PROCEDURE — 84156 ASSAY OF PROTEIN URINE: CPT

## 2024-10-08 PROCEDURE — 82570 ASSAY OF URINE CREATININE: CPT

## 2024-10-08 PROCEDURE — 83615 LACTATE (LD) (LDH) ENZYME: CPT

## 2024-10-08 PROCEDURE — 51702 INSERT TEMP BLADDER CATH: CPT

## 2024-10-08 PROCEDURE — 0JHF3HZ INSERTION OF CONTRACEPTIVE DEVICE INTO LEFT UPPER ARM SUBCUTANEOUS TISSUE AND FASCIA, PERCUTANEOUS APPROACH: ICD-10-PCS | Performed by: OBSTETRICS & GYNECOLOGY

## 2024-10-08 PROCEDURE — 11000001 HC ACUTE MED/SURG PRIVATE ROOM

## 2024-10-08 RX ORDER — PRENATAL WITH FERROUS FUM AND FOLIC ACID 3080; 920; 120; 400; 22; 1.84; 3; 20; 10; 1; 12; 200; 27; 25; 2 [IU]/1; [IU]/1; MG/1; [IU]/1; MG/1; MG/1; MG/1; MG/1; MG/1; MG/1; UG/1; MG/1; MG/1; MG/1; MG/1
1 TABLET ORAL DAILY
Status: DISCONTINUED | OUTPATIENT
Start: 2024-10-09 | End: 2024-10-10 | Stop reason: HOSPADM

## 2024-10-08 RX ORDER — DIPHENOXYLATE HYDROCHLORIDE AND ATROPINE SULFATE 2.5; .025 MG/1; MG/1
2 TABLET ORAL EVERY 6 HOURS PRN
Status: DISCONTINUED | OUTPATIENT
Start: 2024-10-08 | End: 2024-10-10 | Stop reason: HOSPADM

## 2024-10-08 RX ORDER — DIPHENHYDRAMINE HYDROCHLORIDE 50 MG/ML
25 INJECTION INTRAMUSCULAR; INTRAVENOUS EVERY 4 HOURS PRN
Status: DISCONTINUED | OUTPATIENT
Start: 2024-10-08 | End: 2024-10-10 | Stop reason: HOSPADM

## 2024-10-08 RX ORDER — LABETALOL HCL 20 MG/4 ML
80 SYRINGE (ML) INTRAVENOUS ONCE AS NEEDED
Status: DISCONTINUED | OUTPATIENT
Start: 2024-10-08 | End: 2024-10-08

## 2024-10-08 RX ORDER — MISOPROSTOL 100 UG/1
800 TABLET ORAL ONCE AS NEEDED
Status: DISCONTINUED | OUTPATIENT
Start: 2024-10-08 | End: 2024-10-10 | Stop reason: HOSPADM

## 2024-10-08 RX ORDER — LABETALOL HCL 20 MG/4 ML
40 SYRINGE (ML) INTRAVENOUS ONCE AS NEEDED
Status: DISCONTINUED | OUTPATIENT
Start: 2024-10-08 | End: 2024-10-08

## 2024-10-08 RX ORDER — SIMETHICONE 80 MG
1 TABLET,CHEWABLE ORAL EVERY 6 HOURS PRN
Status: DISCONTINUED | OUTPATIENT
Start: 2024-10-08 | End: 2024-10-10 | Stop reason: HOSPADM

## 2024-10-08 RX ORDER — EPHEDRINE SULFATE 50 MG/ML
INJECTION, SOLUTION INTRAVENOUS
Status: COMPLETED
Start: 2024-10-08 | End: 2024-10-08

## 2024-10-08 RX ORDER — MISOPROSTOL 100 MCG
25 TABLET ORAL ONCE
Status: COMPLETED | OUTPATIENT
Start: 2024-10-08 | End: 2024-10-08

## 2024-10-08 RX ORDER — METHYLERGONOVINE MALEATE 0.2 MG/ML
200 INJECTION INTRAVENOUS ONCE AS NEEDED
Status: DISCONTINUED | OUTPATIENT
Start: 2024-10-08 | End: 2024-10-10 | Stop reason: HOSPADM

## 2024-10-08 RX ORDER — FAMOTIDINE 10 MG/ML
20 INJECTION INTRAVENOUS ONCE
Status: DISCONTINUED | OUTPATIENT
Start: 2024-10-08 | End: 2024-10-08

## 2024-10-08 RX ORDER — ACETAMINOPHEN 325 MG/1
650 TABLET ORAL EVERY 6 HOURS SCHEDULED
Status: DISCONTINUED | OUTPATIENT
Start: 2024-10-08 | End: 2024-10-10 | Stop reason: HOSPADM

## 2024-10-08 RX ORDER — OXYTOCIN 10 [USP'U]/ML
10 INJECTION, SOLUTION INTRAMUSCULAR; INTRAVENOUS ONCE AS NEEDED
Status: DISCONTINUED | OUTPATIENT
Start: 2024-10-08 | End: 2024-10-10 | Stop reason: HOSPADM

## 2024-10-08 RX ORDER — CARBOPROST TROMETHAMINE 250 UG/ML
250 INJECTION, SOLUTION INTRAMUSCULAR
Status: DISCONTINUED | OUTPATIENT
Start: 2024-10-08 | End: 2024-10-10 | Stop reason: HOSPADM

## 2024-10-08 RX ORDER — NALBUPHINE HYDROCHLORIDE 10 MG/ML
2.5 INJECTION, SOLUTION INTRAMUSCULAR; INTRAVENOUS; SUBCUTANEOUS ONCE
Status: DISCONTINUED | OUTPATIENT
Start: 2024-10-08 | End: 2024-10-08

## 2024-10-08 RX ORDER — ACETAMINOPHEN 325 MG/1
650 TABLET ORAL EVERY 6 HOURS SCHEDULED
Status: DISCONTINUED | OUTPATIENT
Start: 2024-10-09 | End: 2024-10-08

## 2024-10-08 RX ORDER — DOCUSATE SODIUM 100 MG/1
200 CAPSULE, LIQUID FILLED ORAL 2 TIMES DAILY PRN
Status: DISCONTINUED | OUTPATIENT
Start: 2024-10-08 | End: 2024-10-10 | Stop reason: HOSPADM

## 2024-10-08 RX ORDER — FENTANYL/BUPIVACAINE/NS/PF 2-1250MCG
PLASTIC BAG, INJECTION (ML) INJECTION CONTINUOUS
Status: DISCONTINUED | OUTPATIENT
Start: 2024-10-08 | End: 2024-10-08

## 2024-10-08 RX ORDER — OXYTOCIN-SODIUM CHLORIDE 0.9% IV SOLN 30 UNIT/500ML 30-0.9/5 UT/ML-%
95 SOLUTION INTRAVENOUS CONTINUOUS PRN
Status: DISCONTINUED | OUTPATIENT
Start: 2024-10-08 | End: 2024-10-10 | Stop reason: HOSPADM

## 2024-10-08 RX ORDER — EPHEDRINE SULFATE 50 MG/ML
10 INJECTION, SOLUTION INTRAVENOUS ONCE AS NEEDED
Status: COMPLETED | OUTPATIENT
Start: 2024-10-08 | End: 2024-10-08

## 2024-10-08 RX ORDER — SODIUM CHLORIDE 0.9 % (FLUSH) 0.9 %
10 SYRINGE (ML) INJECTION
Status: DISCONTINUED | OUTPATIENT
Start: 2024-10-08 | End: 2024-10-10 | Stop reason: HOSPADM

## 2024-10-08 RX ORDER — SODIUM CITRATE AND CITRIC ACID MONOHYDRATE 334; 500 MG/5ML; MG/5ML
30 SOLUTION ORAL ONCE
Status: DISCONTINUED | OUTPATIENT
Start: 2024-10-08 | End: 2024-10-08

## 2024-10-08 RX ORDER — HYDRALAZINE HYDROCHLORIDE 20 MG/ML
10 INJECTION INTRAMUSCULAR; INTRAVENOUS ONCE AS NEEDED
Status: DISCONTINUED | OUTPATIENT
Start: 2024-10-08 | End: 2024-10-08

## 2024-10-08 RX ORDER — ONDANSETRON 4 MG/1
8 TABLET, ORALLY DISINTEGRATING ORAL EVERY 8 HOURS PRN
Status: DISCONTINUED | OUTPATIENT
Start: 2024-10-08 | End: 2024-10-10 | Stop reason: HOSPADM

## 2024-10-08 RX ORDER — DIPHENHYDRAMINE HCL 25 MG
25 CAPSULE ORAL EVERY 4 HOURS PRN
Status: DISCONTINUED | OUTPATIENT
Start: 2024-10-08 | End: 2024-10-10 | Stop reason: HOSPADM

## 2024-10-08 RX ORDER — OXYTOCIN-SODIUM CHLORIDE 0.9% IV SOLN 30 UNIT/500ML 30-0.9/5 UT/ML-%
10 SOLUTION INTRAVENOUS ONCE AS NEEDED
Status: DISCONTINUED | OUTPATIENT
Start: 2024-10-08 | End: 2024-10-10 | Stop reason: HOSPADM

## 2024-10-08 RX ORDER — OXYTOCIN-SODIUM CHLORIDE 0.9% IV SOLN 30 UNIT/500ML 30-0.9/5 UT/ML-%
95 SOLUTION INTRAVENOUS ONCE AS NEEDED
Status: DISCONTINUED | OUTPATIENT
Start: 2024-10-08 | End: 2024-10-10 | Stop reason: HOSPADM

## 2024-10-08 RX ORDER — HYDROCORTISONE 25 MG/G
CREAM TOPICAL 3 TIMES DAILY PRN
Status: DISCONTINUED | OUTPATIENT
Start: 2024-10-08 | End: 2024-10-10 | Stop reason: HOSPADM

## 2024-10-08 RX ORDER — LABETALOL HCL 20 MG/4 ML
20 SYRINGE (ML) INTRAVENOUS ONCE AS NEEDED
Status: DISCONTINUED | OUTPATIENT
Start: 2024-10-08 | End: 2024-10-08

## 2024-10-08 RX ADMIN — SODIUM CHLORIDE, POTASSIUM CHLORIDE, SODIUM LACTATE AND CALCIUM CHLORIDE 1000 ML: 600; 310; 30; 20 INJECTION, SOLUTION INTRAVENOUS at 01:10

## 2024-10-08 RX ADMIN — OXYTOCIN 95 MILLI-UNITS/MIN: 10 INJECTION, SOLUTION INTRAMUSCULAR; INTRAVENOUS at 09:10

## 2024-10-08 RX ADMIN — SODIUM CHLORIDE, POTASSIUM CHLORIDE, SODIUM LACTATE AND CALCIUM CHLORIDE: 600; 310; 30; 20 INJECTION, SOLUTION INTRAVENOUS at 06:10

## 2024-10-08 RX ADMIN — OXYTOCIN-SODIUM CHLORIDE 0.9% IV SOLN 30 UNIT/500ML 10 UNITS: 30-0.9/5 SOLUTION at 07:10

## 2024-10-08 RX ADMIN — SODIUM CHLORIDE, POTASSIUM CHLORIDE, SODIUM LACTATE AND CALCIUM CHLORIDE: 600; 310; 30; 20 INJECTION, SOLUTION INTRAVENOUS at 03:10

## 2024-10-08 RX ADMIN — EPHEDRINE SULFATE 10 MG: 50 INJECTION INTRAVENOUS at 02:10

## 2024-10-08 RX ADMIN — MISOPROSTOL 25 MCG: 100 TABLET ORAL at 12:10

## 2024-10-08 RX ADMIN — Medication: at 09:10

## 2024-10-08 RX ADMIN — EPHEDRINE SULFATE 10 MG: 50 INJECTION, SOLUTION INTRAVENOUS at 02:10

## 2024-10-08 RX ADMIN — ACETAMINOPHEN 325MG 650 MG: 325 TABLET ORAL at 09:10

## 2024-10-08 RX ADMIN — SODIUM CHLORIDE, POTASSIUM CHLORIDE, SODIUM LACTATE AND CALCIUM CHLORIDE 500 ML: 600; 310; 30; 20 INJECTION, SOLUTION INTRAVENOUS at 03:10

## 2024-10-08 NOTE — H&P
HISTORY AND PHYSICAL                                                OBSTETRICS          Subjective:      April Luis is a 24 y.o.  female with IUP at 39w2d weeks gestation who presents to L&D for elective induction. Pertinent medical history for this pregnancy includes stable depression on no medications, Hx of syphilis adequately treated in , and GERD relieved with PRN TUMS.  Patient reports infrequent contractions.  She denies vaginal bleeding, leakage of fluid, and decreased fetal movement.  Care this pregnancy has been with [facility].    PMHx:   Past Medical History:   Diagnosis Date    Asthma     Childhood Asthma    Mental disorder     depression       PSHx:   Past Surgical History:   Procedure Laterality Date    DIAGNOSTIC LAPAROSCOPY N/A 2023    Procedure: LAPAROSCOPY, DIAGNOSTIC;  Surgeon: Arline Valente MD;  Location: [place];  Service: OB/GYN;  Laterality: N/A;  10 trocar, 10mm lap bag, humi, enseal, methylene blue, doxycycline    EXCISION OF PELVIC MASS  2023    Procedure: EXCISION, MASS, PELVIS;  Surgeon: Arline Valente MD;  Location: [facility];  Service: OB/GYN;;       All:   Review of patient's allergies indicates:   Allergen Reactions    Penicillins      Unknown: Was told by mother that she was allergic       Meds:   Medications Prior to Admission   Medication Sig Dispense Refill Last Dose/Taking    PNV,calcium 72-iron-folic acid (PRENATAL VITAMIN PLUS LOW IRON) 27 mg iron- 1 mg Tab Take 1 tablet (1 each total) by mouth once daily. 30 tablet 11 10/6/2024    ondansetron (ZOFRAN-ODT) 4 MG TbDL Take 1 tablet (4 mg total) by mouth every 24 hours as needed (Nausea and vomiting). 10 tablet 0 More than a month       SH:   Social History     Socioeconomic History    Marital status: Single   Tobacco Use    Smoking status: Former     Types: Vaping with nicotine     Quit date: 2024     Years since quittin.6     Passive exposure: Never    Smokeless tobacco: Never   Substance and  "Sexual Activity    Alcohol use: Not Currently     Comment: occasionally    Drug use: Not Currently     Types: Marijuana     Comment: occasionally    Sexual activity: Yes     Partners: Male     Birth control/protection: None     Social Drivers of Health     Financial Resource Strain: Low Risk  (10/7/2024)    Overall Financial Resource Strain (CARDIA)     Difficulty of Paying Living Expenses: Not very hard   Food Insecurity: No Food Insecurity (10/7/2024)    Hunger Vital Sign     Worried About Running Out of Food in the Last Year: Never true     Ran Out of Food in the Last Year: Never true   Transportation Needs: No Transportation Needs (10/7/2024)    TRANSPORTATION NEEDS     Transportation : No   Physical Activity: Inactive (3/28/2024)    Exercise Vital Sign     Days of Exercise per Week: 0 days     Minutes of Exercise per Session: 0 min   Stress: No Stress Concern Present (10/7/2024)    [employer] [place] [employer] - Occupational Stress Questionnaire     Feeling of Stress : Not at all   Housing Stability: Low Risk  (10/7/2024)    Housing Stability Vital Sign     Unable to Pay for Housing in the Last Year: No     Homeless in the Last Year: No       FH:   Family History   Problem Relation Name Age of Onset    Diabetes Maternal Grandmother      Diabetes Maternal Grandfather      Hypertension Mother         OBHx:   OB History    Para Term  AB Living   1 0 0 0 0 0   SAB IAB Ectopic Multiple Live Births   0 0 0 0 0      # Outcome Date GA Lbr Carter/2nd Weight Sex Type Anes PTL Lv   1 Current                Objective:      BP (!) 144/80   Pulse 76   Temp 98.3 °F (36.8 °C)   Resp 18   Ht 5' 7" (1.702 m)   Wt 121.6 kg (268 lb)   LMP  (LMP Unknown)   SpO2 96%   Breastfeeding No   BMI 41.97 kg/m²   Body mass index is 41.97 kg/m².    General:   alert and cooperative   HEENT:  normocephalic, atraumatic   Lungs:   clear to auscultation bilaterally   Heart:   regular rate and rhythm, S1, S2 normal "   Abdomen:  gravid, non-tender   Extremities non-tender, no edema   Derm: no rashes or lesions   Psych: appropriate mood and affect   Pelvis:  adequate       EFM: Cat 1, mod variability, +accel, no decel (reassuring, reactive)  TOCO: infrequent    SVE (PeriWATCH)  Dilation (cm): 0.5  Effacement (%): 50  Station: -3  Examined by:: RIMMA Castellanos RN  Simplified Sherman Score: 1     Lab Review  Blood Type O POS  GBBS: negative   Rubella: immune  RPR: 1:2 (consistent throughout pregnancy)  HIV: NR  HepB: NR  Hep BsAg Interp   Date Value Ref Range Status   2024 Nonreactive Nonreactive Final        Lab Results   Component Value Date    WBC 8.21 10/07/2024    WBC 8.21 10/07/2024    HGB 10.9 (L) 10/07/2024    HCT 32.2 (L) 10/07/2024    MCV 81.5 10/07/2024     10/07/2024           Assessment:     24 y.o.  at 39w2d weeks gestation.  Admitted for elective induction    Active Hospital Problems    Diagnosis  POA    *Pregnancy with 39 completed weeks gestation [Z3A.39]  Not Applicable      Resolved Hospital Problems   No resolved problems to display.          Plan:     1. Risks, benefits, alternatives and possible complications have been discussed in detail with the patient. All questions have been answered, and Ms. Smith has voiced understanding and agrees to the treatment plan.  2. Consents signed and in chart  3. Admit to Labor and Delivery unit      Frederick Jean Baptiste MD  [facility] -II  6:56 AM

## 2024-10-08 NOTE — PLAN OF CARE
Problem: Adult Inpatient Plan of Care  Goal: Plan of Care Review  Outcome: Progressing  Goal: Patient-Specific Goal (Individualized)  Outcome: Progressing  Goal: Absence of Hospital-Acquired Illness or Injury  Outcome: Progressing  Goal: Optimal Comfort and Wellbeing  Outcome: Progressing  Goal: Readiness for Transition of Care  Outcome: Progressing     Problem: Bariatric Environmental Safety  Goal: Safety Maintained with Care  Outcome: Progressing     Problem: Infection  Goal: Absence of Infection Signs and Symptoms  Outcome: Progressing     Problem:  Fall Injury Risk  Goal: Absence of Fall, Infant Drop and Related Injury  Outcome: Progressing     Problem: Labor  Goal: Hemostasis  Outcome: Progressing  Goal: Stable Fetal Wellbeing  Outcome: Progressing  Goal: Effective Progression to Delivery  Outcome: Progressing  Goal: Absence of Infection Signs and Symptoms  Outcome: Progressing  Goal: Acceptable Pain Control  Outcome: Progressing  Goal: Normal Uterine Contraction Pattern  Outcome: Progressing

## 2024-10-09 PROCEDURE — 11000001 HC ACUTE MED/SURG PRIVATE ROOM

## 2024-10-09 PROCEDURE — 25000003 PHARM REV CODE 250: Performed by: OBSTETRICS & GYNECOLOGY

## 2024-10-09 PROCEDURE — 25000003 PHARM REV CODE 250

## 2024-10-09 RX ORDER — IBUPROFEN 600 MG/1
600 TABLET ORAL EVERY 6 HOURS PRN
Status: DISCONTINUED | OUTPATIENT
Start: 2024-10-09 | End: 2024-10-10 | Stop reason: HOSPADM

## 2024-10-09 RX ADMIN — DOCUSATE SODIUM 200 MG: 100 CAPSULE, LIQUID FILLED ORAL at 07:10

## 2024-10-09 RX ADMIN — PRENATAL VITAMINS-IRON FUMARATE 27 MG IRON-FOLIC ACID 0.8 MG TABLET 1 TABLET: at 07:10

## 2024-10-09 RX ADMIN — ACETAMINOPHEN 325MG 650 MG: 325 TABLET ORAL at 04:10

## 2024-10-09 RX ADMIN — IBUPROFEN 600 MG: 600 TABLET, FILM COATED ORAL at 10:10

## 2024-10-09 RX ADMIN — ACETAMINOPHEN 325MG 650 MG: 325 TABLET ORAL at 09:10

## 2024-10-09 RX ADMIN — IBUPROFEN 600 MG: 600 TABLET, FILM COATED ORAL at 04:10

## 2024-10-09 NOTE — PROGRESS NOTES
Postpartum Progress Note    Aleksandra Smith is a 24 y.o.  s/p  currently Post-Partum day 1.      Nurse reports no acute events overnight. Patient reports mild abd pain. Patient denies any issues or complaints. Ambulating, voiding, and tolerating regular diet. Passing flatus. Lochia is minimal. No subjective fever or chills. Pain well controlled with tylenol.  No HA, vision changes, dizziness, N/V, CP, SOB, breast pain or lower extremity pain.  Currently breast feeding.  Desires Nexplanon for contraception. Baby in room.      Physical Exam:   Vitals:    10/08/24 2053 10/08/24 2108 10/08/24 2142 10/08/24 2338   BP: (!) 146/78 132/73 138/89 136/85   Pulse: 88 89 86 78   Resp:       Temp:   98.2 °F (36.8 °C) 98.4 °F (36.9 °C)   TempSrc:   Oral Oral   SpO2:    97%   Weight:       Height:           Gen: AAO x 3, NAD, resting in bed comfortably   CV: RRR, S1, S2, no murmurs  Resp: Non labored, lungs CTA bilaterally, good air movement  Abd: fundus firm palpable at the level of the umbilicus, abdomen soft and NT, + BS   Ext: no edema, calves non tender and equal in size, DP/Radial 2+   Psych: cooperative, normal affect      Assessment/Plan  24 y.o. female   Status Post Vaginal delivery PPD#1.     Patient Active Problem List   Diagnosis    Allergies    History of syphilis    Adnexal mass    Irritant contact dermatitis    Nodule of skin of left foot    Hidradenitis suppurativa    Tobacco abuse    Mild episode of recurrent major depressive disorder    Rash    - Syphilis affecting pregnancy in third trimester    -BMI affecting pregnancy in third trimester    - Depression affecting pregnancy    - Pyelectasis (right sided) of fetus on prenatal ultrasound    Non-pregnancy nausea and vomiting    Gastroenteritis    Pregnancy with 39 completed weeks gestation         Continue routine post-partum/operative care, continue to monitor  Patient does plan to Breastfeed  Continue PNV  H/H stable and appropriate  Up ad rowena;  Pelvic Rest for 6 weeks.  DVT PPx - Ambulation   Contraception: Nexplanon  Dispo: Likely stable for discharge home on Postpartum day #2      Patient and Plan discussed with Dr. Mannie Jean Baptiste MD  [facility] -

## 2024-10-09 NOTE — L&D DELIVERY NOTE
[facility] - Labor and Delivery  OBGYN  Operative Note    SUMMARY     Date of Procedure: 10/08/2024    Procedure: Spontaneous Vaginal Delivery    Attending: iPllo Latif DO OB Hospitalist  Resident: Frederick Jean Baptiste MD    Pre-Operative Diagnosis:   Patient Active Problem List   Diagnosis    Allergies    History of syphilis    Adnexal mass    Irritant contact dermatitis    Nodule of skin of left foot    Hidradenitis suppurativa    Tobacco abuse    Mild episode of recurrent major depressive disorder    Rash    - Syphilis affecting pregnancy in third trimester    -BMI affecting pregnancy in third trimester    - Depression affecting pregnancy    - Pyelectasis (right sided) of fetus on prenatal ultrasound    Non-pregnancy nausea and vomiting    Gastroenteritis    Pregnancy with 39 completed weeks gestation       Post-Operative Diagnosis:   Same with addition of  Single liveborn infant now s/p  39w2d without complications  2.  Superficial  perineal laceration without repair  3. R domenico-clitoral/domenico-urethral laceration with repair    Anesthesia: epidural    Procedure in Detail: With good maternal effort a viable liveborn infant was delivered after approximately 15 minutes of pushing. The fetal head delivered in VIK position.  Nuchal cord was present. Remainder of infant delivered without difficulty with shoulder anterior followed by remaining body.    placed on maternal abdomen and bulb suctioning performed. 3VC was cut and clamped after 30 sec delay and cord blood obtained. The vagina, perineum, and cervix were examined. The placenta then delivered spontaneously and was noted to be intact. Lacerations were present. After any necessary repairs were performed, all instruments and sponges were removed from the vagina. Sponge, lap, and needle counts were correct after the procedure.    Repair Suture: 3.0 vicryl    Infant: Liveborn female infant; 3 vessel umbilical cord. Moving both upper extremities  well.   Faustino team present at bedside.    Complications: No    Estimated Blood Loss (EBL): 200 mL           Condition: Mother and baby bonding well        Frederick Jean Baptiste MD   [facility] Resident HO-2  10/08/2024

## 2024-10-10 VITALS
DIASTOLIC BLOOD PRESSURE: 89 MMHG | RESPIRATION RATE: 17 BRPM | TEMPERATURE: 98 F | WEIGHT: 268 LBS | OXYGEN SATURATION: 98 % | BODY MASS INDEX: 42.06 KG/M2 | HEART RATE: 71 BPM | HEIGHT: 67 IN | SYSTOLIC BLOOD PRESSURE: 142 MMHG

## 2024-10-10 PROCEDURE — 25000003 PHARM REV CODE 250

## 2024-10-10 RX ORDER — NIFEDIPINE 30 MG/1
30 TABLET, EXTENDED RELEASE ORAL 2 TIMES DAILY
Status: DISCONTINUED | OUTPATIENT
Start: 2024-10-10 | End: 2024-10-10 | Stop reason: HOSPADM

## 2024-10-10 RX ORDER — ACETAMINOPHEN 325 MG/1
650 TABLET ORAL EVERY 6 HOURS PRN
Qty: 20 TABLET | Refills: 0 | Status: SHIPPED | OUTPATIENT
Start: 2024-10-10

## 2024-10-10 RX ORDER — IBUPROFEN 600 MG/1
600 TABLET ORAL EVERY 6 HOURS PRN
Qty: 20 TABLET | Refills: 0 | Status: SHIPPED | OUTPATIENT
Start: 2024-10-10

## 2024-10-10 RX ORDER — NIFEDIPINE 30 MG/1
30 TABLET, EXTENDED RELEASE ORAL 2 TIMES DAILY
Qty: 120 TABLET | Refills: 0 | Status: SHIPPED | OUTPATIENT
Start: 2024-10-10 | End: 2024-12-09

## 2024-10-10 RX ADMIN — NIFEDIPINE 30 MG: 30 TABLET, FILM COATED, EXTENDED RELEASE ORAL at 09:10

## 2024-10-10 RX ADMIN — IBUPROFEN 600 MG: 600 TABLET, FILM COATED ORAL at 09:10

## 2024-10-10 RX ADMIN — IBUPROFEN 600 MG: 600 TABLET, FILM COATED ORAL at 04:10

## 2024-10-10 NOTE — LACTATION NOTE
This note was copied from a baby's chart.  Mom says baby was cluster feeding last night, more sleepy this morning. Mom tells me that baby latches for easily to one side. Reports some nipple tenderness, using gel pads.     Assisted mom and baby with position and latch. Good latch achieved, rhythmic sucking noted with swallows. Baby was a little sleepy and did need some stimulation. Showed mom how to stimulate baby for continued feeds and using breast compressions.     Discharge instructions reviewed. Answered moms questions. Mom reports breast tenderness early in pregnancy but not really much growth. Discussed milk increasing, signs of and when to expect this. Discussed signs of adequate milk intake in baby. Discussed the possibility of supplementing if milk does not increase as expected. Encouraged to reach out to pediatrician, and/or us with any concerns. Baby is currently having good output with minimal weight loss. Verbalized understanding of all. Mom has a single electric pump for home use, and has ordered a double through insurance.       [facility]   [phone]   Discharge Instructions      Feed baby when you notice early hunger cues, such as rooting, hand to mouth, smacking lips, sticking out tongue.  Crying is a late sign of hunger. Try to get baby to the breast before crying begins. (page 2 & 39 of booklet)      Most newborns need to eat at least 8 times in a 24-hour period. Feeding often will help develop milk supply.  Feed baby anytime hunger cues are noticed, and wake baby if needed to ensure 8 or more feeds per 24 hour period. (pg. 24)      Cluster feeding is normal: baby may nurse very often for several times in a row.  This commonly occurs in the evening or early part of the night. (pg. 4)       Allow your baby to finish one side before offering the other. You can try to burp baby and then offer the other breast if he/she seems to still be hungry.       Skin to skin contact can help a  sleepy baby want to nurse. Babies held skin to skin, often nurse better and longer. Skin to skin increases moms milk-making hormone levels as well. Skin to skin is calming for mom and baby. (pg. 23)      In the early days, the number of wet and dirty diapers baby has each day can help you know if baby is getting enough to eat.  Refer to your breastfeeding booklet (pgs. 2-12) to see how many wet/dirty diapers baby should be having each day.  Contact babys pediatrician or lactation, if baby is not having enough wet and dirty diapers.      It is best to avoid pacifiers and bottles for the first 4 weeks, while getting breastfeeding established.        Back to work or school:  4 weeks is a good time to start pumping after morning feeds, in order to store milk for baby. Although you may pump before if needed. Week 4 is also a good time to introduce a bottle of pumped milk to baby, if you will be going back to work or school.  This can be done sooner if needed. (Refer to pgs 25-27 for pumping and milk storage)       When baby is latched deeply to your breast, you should feel a strong tugging or pulling sensation. If your nipples are sore, you may feel mild discomfort with initial latch that eases quickly.  If there is pain, try to adjust the latch. Make sure your baby opens his mouth wide to latch on. Scan the QR code on page 18 for a video on latching.       Listen for swallowing when baby is nursing. This indicates your baby is transferring that milk!      Your milk will increase between days 3-5.  Frequent feeds can help prevent engorgement.      If your breasts begin to get engorged, refer to pages 20 & 21 for tips on management.  Feed often to help keep your breasts comfortable. If baby is not able to remove milk from your breasts, pumping will be needed to relieve breast fullness and build milk supply. If baby is removing milk well from your breasts, but they are still uncomfortably full after, You may need to pump  for comfort, meaning just pump enough to relieve the fullness.      No soap or lotions to the nipples except for medical grade lanolin or nipple cream for soreness.        All babies go through growth spurts. The first one is generally around 2-3 weeks. If your baby starts to nurse a lot more than usual, this is likely the reason.  Growth spurts happen every so often, and usually lasts for 3-5 days.      Remember to check the safety of any medications, prescription or non-prescription, and herbals, before you take them.  Your babys pediatrician is the best one to confirm the safety of the medication while you are breastfeeding. You may also the lactation department, or the [facility] at [phone], to check the safety of a medication. (pg 31)      Call with any questions or concerns. Dont wait --ask for help early. Breastfeeding Resources can be found on pages 33-35 of your Breastfeeding Booklet given to you in the hospital.

## 2024-10-10 NOTE — PLAN OF CARE
Problem: Breastfeeding  Goal: Effective Breastfeeding  Intervention: Promote Effective Breastfeeding  Flowsheets (Taken 10/10/2024 0020)  Breastfeeding Assistance:   assisted with positioning   feeding cue recognition promoted   feeding session observed   infant latch-on verified   support offered   infant suck/swallow verified  Parent-Child Attachment Promotion:   cue recognition promoted   face-to-face positioning promoted   strengths emphasized   positive reinforcement provided  Intervention: Support Exclusive Breastfeeding Success  Flowsheets (Taken 10/10/2024 0020)  Supportive Measures:   active listening utilized   counseling provided   problem-solving facilitated   positive reinforcement provided   verbalization of feelings encouraged  Breastfeeding Support:   encouragement provided   lactation counseling provided

## 2024-10-10 NOTE — PLAN OF CARE
"  Problem: Adult Inpatient Plan of Care  Goal: Plan of Care Review  Outcome: Not Progressing  Goal: Patient-Specific Goal (Individualized)  Description: "I want to breastfeed my baby"  Outcome: Not Progressing  Goal: Absence of Hospital-Acquired Illness or Injury  Outcome: Not Progressing  Goal: Optimal Comfort and Wellbeing  Outcome: Not Progressing  Goal: Readiness for Transition of Care  Outcome: Not Progressing     Problem: Bariatric Environmental Safety  Goal: Safety Maintained with Care  Outcome: Not Progressing     Problem: Infection  Goal: Absence of Infection Signs and Symptoms  Outcome: Not Progressing     Problem:  Fall Injury Risk  Goal: Absence of Fall, Infant Drop and Related Injury  Outcome: Not Progressing     Problem: Postpartum (Vaginal Delivery)  Goal: Successful Parent Role Transition  Outcome: Not Progressing  Goal: Hemostasis  Outcome: Not Progressing  Goal: Absence of Infection Signs and Symptoms  Outcome: Not Progressing  Goal: Anesthesia/Sedation Recovery  Outcome: Not Progressing  Goal: Optimal Pain Control and Function  Outcome: Not Progressing  Goal: Effective Urinary Elimination  Outcome: Not Progressing     Problem: Breastfeeding  Goal: Effective Breastfeeding  Outcome: Not Progressing     "

## 2024-10-10 NOTE — PLAN OF CARE
"  Problem: Adult Inpatient Plan of Care  Goal: Plan of Care Review  10/10/2024 0934 by Yesenia Cummings RN  Outcome: Progressing  10/10/2024 0934 by Yesenia Cummings RN  Outcome: Not Progressing  Goal: Patient-Specific Goal (Individualized)  Description: "I want to breastfeed my baby"  10/10/2024 0934 by Yesenia Cummings RN  Outcome: Progressing  10/10/2024 0934 by Yesenia Cummings RN  Outcome: Not Progressing  Goal: Absence of Hospital-Acquired Illness or Injury  10/10/2024 0934 by Yesenia Cummings RN  Outcome: Progressing  10/10/2024 0934 by Yesenia Cummings RN  Outcome: Not Progressing  Goal: Optimal Comfort and Wellbeing  10/10/2024 0934 by Yesenia Cummings RN  Outcome: Progressing  10/10/2024 0934 by Yesenia Cummings RN  Outcome: Not Progressing  Goal: Readiness for Transition of Care  10/10/2024 0934 by Yesenia Cummings RN  Outcome: Progressing  10/10/2024 0934 by Yesenia Cummings RN  Outcome: Not Progressing     Problem: Bariatric Environmental Safety  Goal: Safety Maintained with Care  10/10/2024 0934 by Yesenia Cummings RN  Outcome: Progressing  10/10/2024 0934 by Yesenia Cummings RN  Outcome: Not Progressing     Problem: Infection  Goal: Absence of Infection Signs and Symptoms  10/10/2024 0934 by Yesenia Cummings RN  Outcome: Progressing  10/10/2024 0934 by Yesenia Cummings RN  Outcome: Not Progressing     Problem:  Fall Injury Risk  Goal: Absence of Fall, Infant Drop and Related Injury  10/10/2024 0934 by Yesenia Cummings RN  Outcome: Progressing  10/10/2024 0934 by Yesenia Cummings RN  Outcome: Not Progressing     Problem: Postpartum (Vaginal Delivery)  Goal: Successful Parent Role Transition  10/10/2024 0934 by Yesenia Cummings RN  Outcome: Progressing  10/10/2024 0934 by Yesenia Cummings RN  Outcome: Not Progressing  Goal: Hemostasis  10/10/2024 0934 by Yesenia Cummings RN  Outcome: Progressing  10/10/2024 0934 by Yesenia Cummings RN  Outcome: Not Progressing  Goal: Absence of Infection Signs " and Symptoms  10/10/2024 0934 by Yesenia Cummings RN  Outcome: Progressing  10/10/2024 0934 by Yesenia Cummings RN  Outcome: Not Progressing  Goal: Anesthesia/Sedation Recovery  10/10/2024 0934 by Yesenia Cummings RN  Outcome: Progressing  10/10/2024 0934 by Yesenia Cummings RN  Outcome: Not Progressing  Goal: Optimal Pain Control and Function  10/10/2024 0934 by Yesenia Cummings RN  Outcome: Progressing  10/10/2024 0934 by Yesenia Cummings RN  Outcome: Not Progressing  Goal: Effective Urinary Elimination  10/10/2024 0934 by Yesenia Cummings RN  Outcome: Progressing  10/10/2024 0934 by Yesenia Cummings RN  Outcome: Not Progressing     Problem: Breastfeeding  Goal: Effective Breastfeeding  10/10/2024 0934 by Yesenia Cummings RN  Outcome: Progressing  10/10/2024 0934 by Yesenia Cummings RN  Outcome: Not Progressing

## 2024-10-10 NOTE — PLAN OF CARE
"  Problem: Adult Inpatient Plan of Care  Goal: Plan of Care Review  Outcome: Progressing  Goal: Patient-Specific Goal (Individualized)  Description: "I want to breastfeed my baby"  Outcome: Progressing  Goal: Absence of Hospital-Acquired Illness or Injury  Outcome: Progressing  Goal: Optimal Comfort and Wellbeing  Outcome: Progressing  Goal: Readiness for Transition of Care  Outcome: Progressing     Problem: Bariatric Environmental Safety  Goal: Safety Maintained with Care  Outcome: Progressing     Problem: Infection  Goal: Absence of Infection Signs and Symptoms  Outcome: Progressing     Problem:  Fall Injury Risk  Goal: Absence of Fall, Infant Drop and Related Injury  Outcome: Progressing     Problem: Postpartum (Vaginal Delivery)  Goal: Successful Parent Role Transition  Outcome: Progressing  Goal: Hemostasis  Outcome: Progressing  Goal: Absence of Infection Signs and Symptoms  Outcome: Progressing  Goal: Anesthesia/Sedation Recovery  Outcome: Progressing  Goal: Optimal Pain Control and Function  Outcome: Progressing  Goal: Effective Urinary Elimination  Outcome: Progressing     Problem: Breastfeeding  Goal: Effective Breastfeeding  Outcome: Progressing     "

## 2024-10-10 NOTE — DISCHARGE SUMMARY
Delivery Discharge Summary  Obstetrics      Primary OB Clinician: Jake Chand MD      Admission date: 10/7/2024  Discharge date: 10/10/2024    Disposition: To home, self care    Discharge Diagnosis List:      Patient Active Problem List   Diagnosis    Allergies    History of syphilis    Adnexal mass    Irritant contact dermatitis    Nodule of skin of left foot    Hidradenitis suppurativa    Tobacco abuse    Mild episode of recurrent major depressive disorder    Rash    - Syphilis affecting pregnancy in third trimester    -BMI affecting pregnancy in third trimester    - Depression affecting pregnancy    - Pyelectasis (right sided) of fetus on prenatal ultrasound    Non-pregnancy nausea and vomiting    Gastroenteritis    Pregnancy with 39 completed weeks gestation    Vaginal delivery       Procedure:     Hospital Course:  Aleksandra Smith is a 24 y.o. now , PPD #2 who was admitted on 10/7/2024 . Patient was subsequently admitted to labor and delivery unit with signed consents.     Labor course was uncomplicated and resulted in  without complications.     Please see delivery note for further details. Her postpartum course was complicated by elevated Bps, patient was started on procardia XL 30 mg BID. On discharge day, patient's pain is controlled with oral pain medications. Pt is tolerating ambulation without SOB or CP, and regular diet without N/V. Reports lochia is mild. Denies any HA, vision changes, F/C, LE swelling. Denies any breast pain/soreness.    Pt in stable condition and ready for discharge. She has been instructed to start and/or continue medications and follow up with her obstetrics provider as listed below.    Pertinent studies:  CBC  Recent Labs   Lab 10/07/24  2253   WBC 8.21  8.21   HGB 10.9*   HCT 32.2*   MCV 81.5           Exam:  Physical Exam  General: in no acute distress  RESP: clear to auscultation bilaterally, non labored  CV: regular rate and rhythm, no murmurs, no  "edema  ABD: Non tender, BS+; Fundus firm below the level of umbilicus  EXT: Bilateral lower extremity no edema or tenderness      Immunization History   Administered Date(s) Administered    Influenza - Trivalent - Fluarix, Flulaval, Fluzone, Afluria - PF 10/02/2024    Tdap 2024        Delivery:    Episiotomy: None   Lacerations: None   Repair suture:     Repair # of packets: 1   Blood loss (ml):       Birth information:  YOB: 2025   Time of birth: 7:01 PM   Sex: female   Delivery type: Vaginal, Spontaneous   Gestational Age: 39w2d     Measurements    Weight: 3710 g  Weight (lbs): 8 lb 2.9 oz  Length: 53.3 cm  Length (in): 21"  Head circumference: 35.6 cm         Delivery Clinician: Delivery Providers    Delivering clinician: Pillo Latif,    Provider Role    Zonia Solitario, RN Registered Nurse    Manda Toro RN Registered Nurse    Marita Hoffman RN Registered Nurse    Belkys Castellanos RN Registered Nurse             Additional  information:  Forceps:    Vacuum:    Breech:    Observed anomalies      Living?:     Apgars    Living status: Living  Apgar Component Scores:  1 min.:  5 min.:  10 min.:  15 min.:  20 min.:    Skin color:  0  1       Heart rate:  2  2       Reflex irritability:  2  2       Muscle tone:  2  2       Respiratory effort:  1  1       Total:  7  8       Apgars assigned by: REGINA HOFFMAN RN         Placenta: Delivered:       appearance    Patient Instructions:   Current Discharge Medication List        START taking these medications    Details   acetaminophen (TYLENOL) 325 MG tablet Take 2 tablets (650 mg total) by mouth every 6 (six) hours as needed for Pain.  Qty: 20 tablet, Refills: 0      ibuprofen (ADVIL,MOTRIN) 600 MG tablet Take 1 tablet (600 mg total) by mouth every 6 (six) hours as needed for Pain.  Qty: 20 tablet, Refills: 0      NIFEdipine (PROCARDIA-XL) 30 MG (OSM) 24 hr tablet Take 1 tablet (30 mg total) by mouth 2 (two) times a day.  Qty: 120 " tablet, Refills: 0    Comments: .           CONTINUE these medications which have NOT CHANGED    Details   PNV,calcium 72-iron-folic acid (PRENATAL VITAMIN PLUS LOW IRON) 27 mg iron- 1 mg Tab Take 1 tablet (1 each total) by mouth once daily.  Qty: 30 tablet, Refills: 11    Associated Diagnoses: Initial obstetric visit in first trimester           STOP taking these medications       ondansetron (ZOFRAN-ODT) 4 MG TbDL Comments:   Reason for Stopping:               No discharge procedures on file.     Follow-up Information       Glenn Lehman. Go to.    Why: Appointment in 10/17 at 9:30 am                            Follow-up will be at [facility].  Follow-up will be in approximately 1 weeks in [place].  ER precautions were reviewed with the patient and she was given opportunity to ask questions.  Stable for discharge        Frederick Jean Baptiste MD  [facility]

## 2024-10-29 ENCOUNTER — PATIENT MESSAGE (OUTPATIENT)
Dept: FAMILY MEDICINE | Facility: CLINIC | Age: 24
End: 2024-10-29
Payer: MEDICAID

## (undated) DEVICE — HANDLE DEVON RIGID OR LIGHT

## (undated) DEVICE — PAD PREP CUFFED NS 24X48IN

## (undated) DEVICE — SEE L#152161

## (undated) DEVICE — NDL HYPO REG 25G X 1 1/2

## (undated) DEVICE — SEALER TISSUE ENSEAL X1 37CM

## (undated) DEVICE — SUT COATED VICRYL 4/0 27IN

## (undated) DEVICE — PAD PINK TRENDELENBURG POS XL

## (undated) DEVICE — TRAY SKIN SCRUB WET PREMIUM

## (undated) DEVICE — TROCAR KII FIOS 5MM X 100MM

## (undated) DEVICE — APPLICATOR CHLORAPREP ORN 26ML

## (undated) DEVICE — GLOVE PROTEXIS HYDROGEL SZ6

## (undated) DEVICE — MANIFOLD 4 PORT

## (undated) DEVICE — KIT LAPAROSCOPY UNIVERSITY

## (undated) DEVICE — SOL NACL IRR 3000ML

## (undated) DEVICE — TRAY CATH FOL SIL URIMTR 16FR

## (undated) DEVICE — SUT 0 VICRYL / UR6 (J603)

## (undated) DEVICE — GLOVE PROTEXIS BLUE LATEX 7.5

## (undated) DEVICE — SOL IRRI STRL WATER 1000ML

## (undated) DEVICE — ELECTRODE PATIENT RETURN DISP

## (undated) DEVICE — SUT MCRYL PLUS 4-0 PS2 27IN

## (undated) DEVICE — UTERINE MANIPULATOR HUMI 6003

## (undated) DEVICE — GLOVE PROTEXIS BLUE LATEX 7

## (undated) DEVICE — TROCAR LAPSCP XCEL 12MM 10CM

## (undated) DEVICE — BAG TISS RETRV MONARCH 10MM

## (undated) DEVICE — LEGGING SURG CONV 43X28IN

## (undated) DEVICE — GLOVE PROTEXIS NEU-THERA SZ6

## (undated) DEVICE — GLOVE PROTEXIS BLUE LATEX 6.5

## (undated) DEVICE — DRAPE UINDERBUT GRAD PCH

## (undated) DEVICE — SOLIDIFIER BOTTLE 1500CC

## (undated) DEVICE — GLOVE PROTEXIS LTX MICRO 6

## (undated) DEVICE — SLEEVE KII ADV FIX 5X100MM

## (undated) DEVICE — GOWN POLY REINF BRTH SLV XL

## (undated) DEVICE — ADHESIVE DERMABOND ADVANCED

## (undated) DEVICE — SYR 50CC LL

## (undated) DEVICE — IRRIGATOR SUCTION W/TIP

## (undated) DEVICE — SYR 10CC LUER LOCK

## (undated) DEVICE — GLOVE PROTEXIS LTX MICRO  7